# Patient Record
Sex: FEMALE | Race: WHITE | Employment: OTHER | URBAN - METROPOLITAN AREA
[De-identification: names, ages, dates, MRNs, and addresses within clinical notes are randomized per-mention and may not be internally consistent; named-entity substitution may affect disease eponyms.]

---

## 2017-02-08 ENCOUNTER — OFFICE VISIT (OUTPATIENT)
Dept: FAMILY MEDICINE CLINIC | Age: 67
End: 2017-02-08

## 2017-02-08 VITALS
DIASTOLIC BLOOD PRESSURE: 86 MMHG | WEIGHT: 155 LBS | TEMPERATURE: 97.4 F | SYSTOLIC BLOOD PRESSURE: 136 MMHG | HEIGHT: 64 IN | BODY MASS INDEX: 26.46 KG/M2 | RESPIRATION RATE: 16 BRPM | HEART RATE: 72 BPM

## 2017-02-08 DIAGNOSIS — J01.90 ACUTE NON-RECURRENT SINUSITIS, UNSPECIFIED LOCATION: Primary | ICD-10-CM

## 2017-02-08 DIAGNOSIS — E78.2 MIXED HYPERLIPIDEMIA: ICD-10-CM

## 2017-02-08 DIAGNOSIS — R73.9 HYPERGLYCEMIA: ICD-10-CM

## 2017-02-08 DIAGNOSIS — M85.80 OSTEOPENIA: ICD-10-CM

## 2017-02-08 DIAGNOSIS — R73.03 PREDIABETES: ICD-10-CM

## 2017-02-08 DIAGNOSIS — K50.919 CROHN'S DISEASE WITH COMPLICATION, UNSPECIFIED GASTROINTESTINAL TRACT LOCATION (HCC): ICD-10-CM

## 2017-02-08 LAB
ALBUMIN SERPL-MCNC: 4.3 G/DL (ref 3.9–4.9)
ALP BLD-CCNC: 64 U/L (ref 40–130)
ALT SERPL-CCNC: 16 U/L (ref 0–33)
ANION GAP SERPL CALCULATED.3IONS-SCNC: 13 MEQ/L (ref 7–13)
AST SERPL-CCNC: 19 U/L (ref 0–35)
BILIRUB SERPL-MCNC: 0.4 MG/DL (ref 0–1.2)
BUN BLDV-MCNC: 14 MG/DL (ref 8–23)
CALCIUM SERPL-MCNC: 9.4 MG/DL (ref 8.6–10.2)
CHLORIDE BLD-SCNC: 101 MEQ/L (ref 98–107)
CHOLESTEROL, TOTAL: 237 MG/DL (ref 0–199)
CO2: 28 MEQ/L (ref 22–29)
CREAT SERPL-MCNC: 0.72 MG/DL (ref 0.5–0.9)
GFR AFRICAN AMERICAN: >60
GFR NON-AFRICAN AMERICAN: >60
GLOBULIN: 2 G/DL (ref 2.3–3.5)
GLUCOSE BLD-MCNC: 88 MG/DL (ref 74–109)
HBA1C MFR BLD: 5.3 % (ref 4.8–5.9)
HCT VFR BLD CALC: 43.9 % (ref 37–47)
HDLC SERPL-MCNC: 47 MG/DL (ref 40–59)
HEMOGLOBIN: 14.6 G/DL (ref 12–16)
LDL CHOLESTEROL CALCULATED: 133 MG/DL (ref 0–129)
MCH RBC QN AUTO: 30.9 PG (ref 27–31.3)
MCHC RBC AUTO-ENTMCNC: 33.3 % (ref 33–37)
MCV RBC AUTO: 92.6 FL (ref 82–100)
PDW BLD-RTO: 13.6 % (ref 11.5–14.5)
PLATELET # BLD: 196 K/UL (ref 130–400)
POTASSIUM SERPL-SCNC: 4.2 MEQ/L (ref 3.5–5.1)
RBC # BLD: 4.74 M/UL (ref 4.2–5.4)
SODIUM BLD-SCNC: 142 MEQ/L (ref 132–144)
TOTAL PROTEIN: 6.3 G/DL (ref 6.4–8.1)
TRIGL SERPL-MCNC: 283 MG/DL (ref 0–200)
WBC # BLD: 8.2 K/UL (ref 4.8–10.8)

## 2017-02-08 PROCEDURE — 3017F COLORECTAL CA SCREEN DOC REV: CPT | Performed by: FAMILY MEDICINE

## 2017-02-08 PROCEDURE — G8427 DOCREV CUR MEDS BY ELIG CLIN: HCPCS | Performed by: FAMILY MEDICINE

## 2017-02-08 PROCEDURE — 4004F PT TOBACCO SCREEN RCVD TLK: CPT | Performed by: FAMILY MEDICINE

## 2017-02-08 PROCEDURE — 4040F PNEUMOC VAC/ADMIN/RCVD: CPT | Performed by: FAMILY MEDICINE

## 2017-02-08 PROCEDURE — 1123F ACP DISCUSS/DSCN MKR DOCD: CPT | Performed by: FAMILY MEDICINE

## 2017-02-08 PROCEDURE — 1090F PRES/ABSN URINE INCON ASSESS: CPT | Performed by: FAMILY MEDICINE

## 2017-02-08 PROCEDURE — G8399 PT W/DXA RESULTS DOCUMENT: HCPCS | Performed by: FAMILY MEDICINE

## 2017-02-08 PROCEDURE — 3014F SCREEN MAMMO DOC REV: CPT | Performed by: FAMILY MEDICINE

## 2017-02-08 PROCEDURE — G8420 CALC BMI NORM PARAMETERS: HCPCS | Performed by: FAMILY MEDICINE

## 2017-02-08 PROCEDURE — 99214 OFFICE O/P EST MOD 30 MIN: CPT | Performed by: FAMILY MEDICINE

## 2017-02-08 PROCEDURE — G8484 FLU IMMUNIZE NO ADMIN: HCPCS | Performed by: FAMILY MEDICINE

## 2017-02-08 RX ORDER — GABAPENTIN 300 MG/1
CAPSULE ORAL
Refills: 3 | COMMUNITY
Start: 2016-11-11 | End: 2018-05-14 | Stop reason: CLARIF

## 2017-02-08 RX ORDER — AMOXICILLIN 875 MG/1
875 TABLET, COATED ORAL 2 TIMES DAILY
Qty: 20 TABLET | Refills: 0 | Status: SHIPPED | OUTPATIENT
Start: 2017-02-08 | End: 2017-02-18

## 2017-02-08 RX ORDER — FLUTICASONE PROPIONATE 50 MCG
1 SPRAY, SUSPENSION (ML) NASAL DAILY
Qty: 1 BOTTLE | Refills: 3 | Status: SHIPPED | OUTPATIENT
Start: 2017-02-08 | End: 2019-02-05 | Stop reason: SDUPTHER

## 2017-02-08 ASSESSMENT — PATIENT HEALTH QUESTIONNAIRE - PHQ9
SUM OF ALL RESPONSES TO PHQ QUESTIONS 1-9: 0
SUM OF ALL RESPONSES TO PHQ9 QUESTIONS 1 & 2: 0
1. LITTLE INTEREST OR PLEASURE IN DOING THINGS: 0
2. FEELING DOWN, DEPRESSED OR HOPELESS: 0

## 2017-03-14 RX ORDER — ALPRAZOLAM 0.25 MG/1
0.25 TABLET ORAL NIGHTLY PRN
Qty: 90 TABLET | Refills: 0 | Status: SHIPPED | OUTPATIENT
Start: 2017-03-14 | End: 2017-06-21 | Stop reason: SDUPTHER

## 2017-06-15 ENCOUNTER — TELEPHONE (OUTPATIENT)
Dept: UROLOGY | Age: 67
End: 2017-06-15

## 2017-06-15 ENCOUNTER — OFFICE VISIT (OUTPATIENT)
Dept: UROLOGY | Age: 67
End: 2017-06-15

## 2017-06-15 ENCOUNTER — HOSPITAL ENCOUNTER (OUTPATIENT)
Dept: GENERAL RADIOLOGY | Age: 67
Discharge: HOME | End: 2017-06-15
Payer: MEDICARE

## 2017-06-15 VITALS
DIASTOLIC BLOOD PRESSURE: 82 MMHG | SYSTOLIC BLOOD PRESSURE: 132 MMHG | BODY MASS INDEX: 26.63 KG/M2 | HEART RATE: 74 BPM | WEIGHT: 156 LBS | HEIGHT: 64 IN

## 2017-06-15 DIAGNOSIS — N20.1 URETERAL STONE: ICD-10-CM

## 2017-06-15 DIAGNOSIS — N20.0 KIDNEY STONES: ICD-10-CM

## 2017-06-15 DIAGNOSIS — N20.0 RENAL CALCULI: Primary | ICD-10-CM

## 2017-06-15 DIAGNOSIS — N20.0 KIDNEY STONES: Primary | ICD-10-CM

## 2017-06-15 PROCEDURE — G8427 DOCREV CUR MEDS BY ELIG CLIN: HCPCS | Performed by: UROLOGY

## 2017-06-15 PROCEDURE — 1123F ACP DISCUSS/DSCN MKR DOCD: CPT | Performed by: UROLOGY

## 2017-06-15 PROCEDURE — 4040F PNEUMOC VAC/ADMIN/RCVD: CPT | Performed by: UROLOGY

## 2017-06-15 PROCEDURE — 74000 XR ABDOMEN LIMITED (KUB): CPT

## 2017-06-15 PROCEDURE — G8419 CALC BMI OUT NRM PARAM NOF/U: HCPCS | Performed by: UROLOGY

## 2017-06-15 PROCEDURE — 1090F PRES/ABSN URINE INCON ASSESS: CPT | Performed by: UROLOGY

## 2017-06-15 PROCEDURE — 99213 OFFICE O/P EST LOW 20 MIN: CPT | Performed by: UROLOGY

## 2017-06-15 PROCEDURE — 4004F PT TOBACCO SCREEN RCVD TLK: CPT | Performed by: UROLOGY

## 2017-06-15 PROCEDURE — 3017F COLORECTAL CA SCREEN DOC REV: CPT | Performed by: UROLOGY

## 2017-06-15 PROCEDURE — G8399 PT W/DXA RESULTS DOCUMENT: HCPCS | Performed by: UROLOGY

## 2017-06-15 PROCEDURE — 3014F SCREEN MAMMO DOC REV: CPT | Performed by: UROLOGY

## 2017-06-23 RX ORDER — ALPRAZOLAM 0.25 MG/1
0.25 TABLET ORAL NIGHTLY PRN
Qty: 90 TABLET | Refills: 0 | Status: SHIPPED | OUTPATIENT
Start: 2017-06-23 | End: 2017-09-13 | Stop reason: SDUPTHER

## 2017-08-29 ENCOUNTER — OFFICE VISIT (OUTPATIENT)
Dept: FAMILY MEDICINE CLINIC | Age: 67
End: 2017-08-29

## 2017-08-29 VITALS
DIASTOLIC BLOOD PRESSURE: 82 MMHG | WEIGHT: 155 LBS | BODY MASS INDEX: 26.46 KG/M2 | RESPIRATION RATE: 16 BRPM | HEIGHT: 64 IN | HEART RATE: 64 BPM | OXYGEN SATURATION: 98 % | TEMPERATURE: 97.9 F | SYSTOLIC BLOOD PRESSURE: 108 MMHG

## 2017-08-29 DIAGNOSIS — J40 BRONCHITIS: ICD-10-CM

## 2017-08-29 DIAGNOSIS — R73.03 PREDIABETES: ICD-10-CM

## 2017-08-29 DIAGNOSIS — J06.9 VIRAL URI WITH COUGH: Primary | ICD-10-CM

## 2017-08-29 DIAGNOSIS — Z12.31 VISIT FOR SCREENING MAMMOGRAM: ICD-10-CM

## 2017-08-29 DIAGNOSIS — E78.2 MIXED HYPERLIPIDEMIA: ICD-10-CM

## 2017-08-29 DIAGNOSIS — K50.919 CROHN'S DISEASE WITH COMPLICATION, UNSPECIFIED GASTROINTESTINAL TRACT LOCATION (HCC): ICD-10-CM

## 2017-08-29 LAB
ALBUMIN SERPL-MCNC: 4.1 G/DL (ref 3.9–4.9)
ALP BLD-CCNC: 69 U/L (ref 40–130)
ALT SERPL-CCNC: 16 U/L (ref 0–33)
ANION GAP SERPL CALCULATED.3IONS-SCNC: 14 MEQ/L (ref 7–13)
AST SERPL-CCNC: 16 U/L (ref 0–35)
BILIRUB SERPL-MCNC: 0.4 MG/DL (ref 0–1.2)
BUN BLDV-MCNC: 17 MG/DL (ref 8–23)
CALCIUM SERPL-MCNC: 9.2 MG/DL (ref 8.6–10.2)
CHLORIDE BLD-SCNC: 103 MEQ/L (ref 98–107)
CHOLESTEROL, TOTAL: 252 MG/DL (ref 0–199)
CO2: 26 MEQ/L (ref 22–29)
CREAT SERPL-MCNC: 0.66 MG/DL (ref 0.5–0.9)
GFR AFRICAN AMERICAN: >60
GFR NON-AFRICAN AMERICAN: >60
GLOBULIN: 2.4 G/DL (ref 2.3–3.5)
GLUCOSE BLD-MCNC: 99 MG/DL (ref 74–109)
HBA1C MFR BLD: 5 % (ref 4.8–5.9)
HCT VFR BLD CALC: 40.3 % (ref 37–47)
HDLC SERPL-MCNC: 49 MG/DL (ref 40–59)
HEMOGLOBIN: 13.2 G/DL (ref 12–16)
LDL CHOLESTEROL CALCULATED: 156 MG/DL (ref 0–129)
MCH RBC QN AUTO: 31.2 PG (ref 27–31.3)
MCHC RBC AUTO-ENTMCNC: 32.7 % (ref 33–37)
MCV RBC AUTO: 95.4 FL (ref 82–100)
PDW BLD-RTO: 13.5 % (ref 11.5–14.5)
PLATELET # BLD: 202 K/UL (ref 130–400)
POTASSIUM SERPL-SCNC: 4.4 MEQ/L (ref 3.5–5.1)
RBC # BLD: 4.23 M/UL (ref 4.2–5.4)
SODIUM BLD-SCNC: 143 MEQ/L (ref 132–144)
TOTAL PROTEIN: 6.5 G/DL (ref 6.4–8.1)
TRIGL SERPL-MCNC: 234 MG/DL (ref 0–200)
WBC # BLD: 5.5 K/UL (ref 4.8–10.8)

## 2017-08-29 PROCEDURE — 4040F PNEUMOC VAC/ADMIN/RCVD: CPT | Performed by: FAMILY MEDICINE

## 2017-08-29 PROCEDURE — 99214 OFFICE O/P EST MOD 30 MIN: CPT | Performed by: FAMILY MEDICINE

## 2017-08-29 PROCEDURE — 3017F COLORECTAL CA SCREEN DOC REV: CPT | Performed by: FAMILY MEDICINE

## 2017-08-29 PROCEDURE — G8427 DOCREV CUR MEDS BY ELIG CLIN: HCPCS | Performed by: FAMILY MEDICINE

## 2017-08-29 PROCEDURE — G8419 CALC BMI OUT NRM PARAM NOF/U: HCPCS | Performed by: FAMILY MEDICINE

## 2017-08-29 PROCEDURE — G8399 PT W/DXA RESULTS DOCUMENT: HCPCS | Performed by: FAMILY MEDICINE

## 2017-08-29 PROCEDURE — 3014F SCREEN MAMMO DOC REV: CPT | Performed by: FAMILY MEDICINE

## 2017-08-29 PROCEDURE — 4004F PT TOBACCO SCREEN RCVD TLK: CPT | Performed by: FAMILY MEDICINE

## 2017-08-29 PROCEDURE — 1090F PRES/ABSN URINE INCON ASSESS: CPT | Performed by: FAMILY MEDICINE

## 2017-08-29 PROCEDURE — 1123F ACP DISCUSS/DSCN MKR DOCD: CPT | Performed by: FAMILY MEDICINE

## 2017-08-29 RX ORDER — ALBUTEROL SULFATE 90 UG/1
2 AEROSOL, METERED RESPIRATORY (INHALATION) EVERY 6 HOURS PRN
Qty: 1 INHALER | Refills: 3 | Status: SHIPPED | OUTPATIENT
Start: 2017-08-29 | End: 2019-08-27 | Stop reason: ALTCHOICE

## 2017-08-29 RX ORDER — CEFUROXIME AXETIL 500 MG/1
500 TABLET ORAL 2 TIMES DAILY
Qty: 14 TABLET | Refills: 0 | Status: SHIPPED | OUTPATIENT
Start: 2017-08-29 | End: 2017-09-05

## 2017-08-29 RX ORDER — PREDNISONE 20 MG/1
20 TABLET ORAL DAILY
Qty: 5 TABLET | Refills: 0 | Status: SHIPPED | OUTPATIENT
Start: 2017-08-29 | End: 2017-09-03

## 2017-09-06 ENCOUNTER — TELEPHONE (OUTPATIENT)
Dept: FAMILY MEDICINE CLINIC | Age: 67
End: 2017-09-06

## 2017-09-11 ENCOUNTER — HOSPITAL ENCOUNTER (OUTPATIENT)
Dept: WOMENS IMAGING | Age: 67
Discharge: HOME | End: 2017-09-11
Payer: MEDICARE

## 2017-09-11 DIAGNOSIS — Z12.31 VISIT FOR SCREENING MAMMOGRAM: ICD-10-CM

## 2017-09-11 PROCEDURE — 77063 BREAST TOMOSYNTHESIS BI: CPT

## 2017-09-13 ENCOUNTER — OFFICE VISIT (OUTPATIENT)
Dept: FAMILY MEDICINE CLINIC | Age: 67
End: 2017-09-13

## 2017-09-13 VITALS
OXYGEN SATURATION: 95 % | SYSTOLIC BLOOD PRESSURE: 106 MMHG | TEMPERATURE: 97.6 F | HEIGHT: 64 IN | HEART RATE: 78 BPM | RESPIRATION RATE: 16 BRPM | DIASTOLIC BLOOD PRESSURE: 76 MMHG | WEIGHT: 155 LBS | BODY MASS INDEX: 26.46 KG/M2

## 2017-09-13 DIAGNOSIS — R05.9 COUGH: ICD-10-CM

## 2017-09-13 DIAGNOSIS — K50.919 CROHN'S DISEASE WITH COMPLICATION, UNSPECIFIED GASTROINTESTINAL TRACT LOCATION (HCC): ICD-10-CM

## 2017-09-13 DIAGNOSIS — Z23 NEED FOR STREPTOCOCCUS PNEUMONIAE AND INFLUENZA VACCINATION: ICD-10-CM

## 2017-09-13 DIAGNOSIS — H73.012 BULLOUS MYRINGITIS OF LEFT EAR: ICD-10-CM

## 2017-09-13 DIAGNOSIS — E78.2 MIXED HYPERLIPIDEMIA: ICD-10-CM

## 2017-09-13 DIAGNOSIS — R73.03 PREDIABETES: Primary | ICD-10-CM

## 2017-09-13 PROCEDURE — 99213 OFFICE O/P EST LOW 20 MIN: CPT | Performed by: FAMILY MEDICINE

## 2017-09-13 PROCEDURE — G0008 ADMIN INFLUENZA VIRUS VAC: HCPCS | Performed by: FAMILY MEDICINE

## 2017-09-13 PROCEDURE — 90662 IIV NO PRSV INCREASED AG IM: CPT | Performed by: FAMILY MEDICINE

## 2017-09-13 PROCEDURE — G8417 CALC BMI ABV UP PARAM F/U: HCPCS | Performed by: FAMILY MEDICINE

## 2017-09-13 PROCEDURE — G8399 PT W/DXA RESULTS DOCUMENT: HCPCS | Performed by: FAMILY MEDICINE

## 2017-09-13 PROCEDURE — G0009 ADMIN PNEUMOCOCCAL VACCINE: HCPCS | Performed by: FAMILY MEDICINE

## 2017-09-13 PROCEDURE — 1123F ACP DISCUSS/DSCN MKR DOCD: CPT | Performed by: FAMILY MEDICINE

## 2017-09-13 PROCEDURE — 3017F COLORECTAL CA SCREEN DOC REV: CPT | Performed by: FAMILY MEDICINE

## 2017-09-13 PROCEDURE — 1090F PRES/ABSN URINE INCON ASSESS: CPT | Performed by: FAMILY MEDICINE

## 2017-09-13 PROCEDURE — 4040F PNEUMOC VAC/ADMIN/RCVD: CPT | Performed by: FAMILY MEDICINE

## 2017-09-13 PROCEDURE — 4004F PT TOBACCO SCREEN RCVD TLK: CPT | Performed by: FAMILY MEDICINE

## 2017-09-13 PROCEDURE — G8427 DOCREV CUR MEDS BY ELIG CLIN: HCPCS | Performed by: FAMILY MEDICINE

## 2017-09-13 PROCEDURE — 3014F SCREEN MAMMO DOC REV: CPT | Performed by: FAMILY MEDICINE

## 2017-09-13 PROCEDURE — 90732 PPSV23 VACC 2 YRS+ SUBQ/IM: CPT | Performed by: FAMILY MEDICINE

## 2017-09-13 RX ORDER — AZITHROMYCIN 250 MG/1
TABLET, FILM COATED ORAL
Qty: 1 PACKET | Refills: 0 | Status: SHIPPED | OUTPATIENT
Start: 2017-09-13 | End: 2017-09-23

## 2017-09-13 RX ORDER — ALPRAZOLAM 0.25 MG/1
0.25 TABLET ORAL 3 TIMES DAILY PRN
Qty: 90 TABLET | Refills: 0 | Status: SHIPPED | OUTPATIENT
Start: 2017-09-13 | End: 2017-12-05 | Stop reason: SDUPTHER

## 2017-10-03 ENCOUNTER — TELEPHONE (OUTPATIENT)
Dept: FAMILY MEDICINE CLINIC | Age: 67
End: 2017-10-03

## 2017-10-07 ENCOUNTER — OFFICE VISIT (OUTPATIENT)
Dept: FAMILY MEDICINE CLINIC | Age: 67
End: 2017-10-07

## 2017-10-07 VITALS
SYSTOLIC BLOOD PRESSURE: 130 MMHG | DIASTOLIC BLOOD PRESSURE: 82 MMHG | TEMPERATURE: 97.7 F | BODY MASS INDEX: 26.46 KG/M2 | HEIGHT: 64 IN | HEART RATE: 76 BPM | WEIGHT: 155 LBS

## 2017-10-07 DIAGNOSIS — E78.2 MIXED HYPERLIPIDEMIA: ICD-10-CM

## 2017-10-07 DIAGNOSIS — R73.03 PREDIABETES: ICD-10-CM

## 2017-10-07 DIAGNOSIS — K50.919 CROHN'S DISEASE WITH COMPLICATION, UNSPECIFIED GASTROINTESTINAL TRACT LOCATION (HCC): ICD-10-CM

## 2017-10-07 DIAGNOSIS — H73.012 BULLOUS MYRINGITIS OF LEFT EAR: Primary | ICD-10-CM

## 2017-10-07 PROCEDURE — 4040F PNEUMOC VAC/ADMIN/RCVD: CPT | Performed by: FAMILY MEDICINE

## 2017-10-07 PROCEDURE — G8399 PT W/DXA RESULTS DOCUMENT: HCPCS | Performed by: FAMILY MEDICINE

## 2017-10-07 PROCEDURE — 3014F SCREEN MAMMO DOC REV: CPT | Performed by: FAMILY MEDICINE

## 2017-10-07 PROCEDURE — G8484 FLU IMMUNIZE NO ADMIN: HCPCS | Performed by: FAMILY MEDICINE

## 2017-10-07 PROCEDURE — 3017F COLORECTAL CA SCREEN DOC REV: CPT | Performed by: FAMILY MEDICINE

## 2017-10-07 PROCEDURE — 99213 OFFICE O/P EST LOW 20 MIN: CPT | Performed by: FAMILY MEDICINE

## 2017-10-07 PROCEDURE — G8427 DOCREV CUR MEDS BY ELIG CLIN: HCPCS | Performed by: FAMILY MEDICINE

## 2017-10-07 PROCEDURE — 1090F PRES/ABSN URINE INCON ASSESS: CPT | Performed by: FAMILY MEDICINE

## 2017-10-07 PROCEDURE — 1123F ACP DISCUSS/DSCN MKR DOCD: CPT | Performed by: FAMILY MEDICINE

## 2017-10-07 PROCEDURE — G8417 CALC BMI ABV UP PARAM F/U: HCPCS | Performed by: FAMILY MEDICINE

## 2017-10-07 PROCEDURE — 4004F PT TOBACCO SCREEN RCVD TLK: CPT | Performed by: FAMILY MEDICINE

## 2017-10-07 RX ORDER — FLUTICASONE PROPIONATE 50 MCG
1 SPRAY, SUSPENSION (ML) NASAL DAILY
Qty: 1 BOTTLE | Refills: 3 | Status: SHIPPED | OUTPATIENT
Start: 2017-10-07 | End: 2018-02-26

## 2017-10-07 RX ORDER — AZITHROMYCIN 250 MG/1
TABLET, FILM COATED ORAL
Qty: 1 PACKET | Refills: 0 | Status: SHIPPED | OUTPATIENT
Start: 2017-10-07 | End: 2017-10-17

## 2017-10-07 NOTE — PROGRESS NOTES
Subjective  [address] [facility], 73 y.o. female presents today with:  Chief Complaint   Patient presents with    Ear Fullness     still having difficulty hearing-s/p zpak       Complains of ear pain   Adamantly declines statin for cholesterol  Going on vacay [id number]      Past Medical History:   Diagnosis Date    Bruit     RIGHT    Burn     3RD DEGREE    Crohn's disease ([address]     Hyperlipidemia     Leukocytosis     Normal cardiac stress test 2013    Osteopenia 2013    Prediabetes      Past Surgical History:   Procedure Laterality Date    APPENDECTOMY  1981   [facility]    COLONOSCOPY  2011    needs 2016 likely- yuzon-hx polyp(hyperplastic)    HYSTERECTOMY  1987    LITHOTRIPSY  05/11/16    DR Heraclio Samuels SHOULDER SURGERY  1977     Social History     Social History    Marital status:      Spouse name: N/A    Number of children: N/A    Years of education: N/A     Occupational History    Not on file.      Social History Main Topics    Smoking status: Current Every Day Smoker     Types: E-Cigarettes    Smokeless tobacco: Never Used      Comment: Vapor     Alcohol use Yes    Drug use: No    Sexual activity: Not on file     Other Topics Concern    Not on file     Social History Narrative    No narrative on file     Family History   Problem Relation Age of Onset    Cancer Mother      LEUKEMIA     No Known Allergies  Current Outpatient Prescriptions   Medication Sig Dispense Refill    ALPRAZolam (XANAX) 0.25 MG tablet Take 1 tablet by mouth 3 times daily as needed for Sleep or Anxiety 90 tablet 0    albuterol sulfate HFA (PROVENTIL HFA) 108 (90 Base) MCG/ACT inhaler Inhale 2 puffs into the lungs every 6 hours as needed for Wheezing 1 Inhaler 3    cetirizine (ZYRTEC) 10 MG tablet Take 1 tablet by mouth daily 30 tablet 0    gabapentin (NEURONTIN) 300 MG capsule TAKE 1 CAPSULE BY MOUTH DAILY  3    fluticasone (FLONASE) 50 MCG/ACT nasal spray 1 spray by Nasal route daily 1 Bottle 3  gabapentin (NEURONTIN) 300 MG capsule Take 1 capsule by mouth daily 30 capsule 3    aspirin 81 MG tablet Take 81 mg by mouth daily      melatonin 5 MG TABS tablet Take 5 mg by mouth daily      Ascorbic Acid (VITAMIN C) 500 MG tablet Take 500 mg by mouth daily      IRON PO Take by mouth      Calcium Citrate-Vitamin D (CALCIUM + D PO) Take by mouth      Omega-3 Fatty Acids (FISH OIL PO) Take by mouth      Probiotic Product (PROBIOTIC PO) Take by mouth      Misc Natural Products (BEE PROPOLIS PO) Take by mouth      b complex vitamins capsule Take 1 capsule by mouth daily      Specialty Vitamins Products (ECHINACEA C COMPLETE PO) Take by mouth      Naproxen Sodium (ALEVE PO) Take by mouth      BEE POLLEN PO Take by mouth      Boswellia Bri (BOSWELLIA PO) Take by mouth       No current facility-administered medications for this visit. The patient denies any history of      seizures,             heart attack or KNOWN CAD        or stroke. No chest pain, shortness of breath, paroxysmal nocturnal dyspnea. No nausea, vomiting, diarrhea, hematochezia or melena. No paresthesias or headaches. No dysuria, frequency or hematuria. Last labs  No visits with results within 1 Month(s) from this visit. Latest known visit with results is:   Orders Only on 08/29/2017   Component Date Value Ref Range Status    Cholesterol, Total 08/29/2017 252* 0 - 199 mg/dL Final    Triglycerides 08/29/2017 234* 0 - 200 mg/dL Final    HDL 08/29/2017 49  40 - 59 mg/dL Final    Comment: ATP III HDL Cholesterol Classification is Desirable. Expected Values:    Males:    >55 = No Risk            35-55 = Moderate Risk            <35 = High Risk    Females:  >65 = No Risk            45-65 = Moderate Risk            <45 = High Risk    NCEP Guidelines:   Third Report May 2001  >59 = negative risk factor for CHD  <40 = major risk factor for CHD      LDL Calculated 08/29/2017 156* 0 - 129 mg/dL Final    WBC 08/29/2017 5.5  4.8 - 10.8 K/uL Final    RBC 08/29/2017 4.23  4.20 - 5.40 M/uL Final    Hemoglobin 08/29/2017 13.2  12.0 - 16.0 g/dL Final    Hematocrit 08/29/2017 40.3  37.0 - 47.0 % Final    MCV 08/29/2017 95.4  82.0 - 100.0 fL Final    MCH 08/29/2017 31.2  27.0 - 31.3 pg Final    MCHC 08/29/2017 32.7* 33.0 - 37.0 % Final    RDW 08/29/2017 13.5  11.5 - 14.5 % Final    Platelets 83/47/1875 202  130 - 400 K/uL Final    Hemoglobin A1C 08/29/2017 5.0  4.8 - 5.9 % Final    Sodium 08/29/2017 143  132 - 144 mEq/L Final    Potassium 08/29/2017 4.4  3.5 - 5.1 mEq/L Final    Chloride 08/29/2017 103  98 - 107 mEq/L Final    CO2 08/29/2017 26  22 - 29 mEq/L Final    Anion Gap 08/29/2017 14* 7 - 13 mEq/L Final    Glucose 08/29/2017 99  74 - 109 mg/dL Final    BUN 08/29/2017 17  8 - 23 mg/dL Final    CREATININE 08/29/2017 0.66  0.50 - 0.90 mg/dL Final    GFR Non- 08/29/2017 >60.0  >60 Final    Comment: >60 mL/min/1.73m2 EGFR, calc. for ages 25 and older using the  MDRD formula (not corrected for weight), is valid for stable  renal function.  GFR  08/29/2017 >60.0  >60 Final    Comment: >60 mL/min/1.73m2 EGFR, calc. for ages 25 and older using the  MDRD formula (not corrected for weight), is valid for stable  renal function.       Calcium 08/29/2017 9.2  8.6 - 10.2 mg/dL Final    Total Protein 08/29/2017 6.5  6.4 - 8.1 g/dL Final    Alb 08/29/2017 4.1  3.9 - 4.9 g/dL Final    Total Bilirubin 08/29/2017 0.4  0.0 - 1.2 mg/dL Final    Alkaline Phosphatase 08/29/2017 69  40 - 130 U/L Final    ALT 08/29/2017 16  0 - 33 U/L Final    AST 08/29/2017 16  0 - 35 U/L Final    Globulin 08/29/2017 2.4  2.3 - 3.5 g/dL Final     Health Maintenance   Topic Date Due    Hepatitis C screen  1950    HIV screen  10/28/1966    Zostavax vaccine  10/28/2017 (Originally 10/28/2011)    DTaP/Tdap/Td vaccine (1 - Tdap) 03/13/2018 (Originally 10/28/1970)    Breast cancer screen 09/11/2019    Colon cancer screen colonoscopy  03/01/2021    Lipid screen  08/29/2022    DEXA (modify frequency per FRAX score)  Completed    Flu vaccine  Completed    Pneumococcal low/med risk  Completed       Today's office tests  No results found for this visit on 10/07/17. Objective    Vitals:    10/07/17 1135   BP: 130/82   Pulse: 76   Temp: 97.7 °F (36.5 °C)   TempSrc: Tympanic   Weight: 155 lb (70.3 kg)   Height: 5' 3.5\" (1.613 m)       PHYSICAL EXAMINATION:        GENERAL:    The patient appears well nourished and well-developed,     Normal affect. Not appearing significantly anxious or depressed. No acute respiratory distress. Alert and oriented times 3. Skin:     No skin rashes. No concerning moles observed. Gait:    Normal gait. No ataxia. HEENT:  Normocephalic, atraumatic. Throat:  Pharynx is clear, no erythema/ edema or exudates   Ears:    TM  with NON purulent fluid behind    Minimal but improved     Eyes:  Extraocular eye motions intact and pain free. Pupils reactive/equal    Sclerae and conjunctivae clear            NECK: No masses or adenopathy palpable. NO nuchal rigidity    No carotid bruits heard. No asymmetry visible. No thyromegaly. RESPIRATORY:   Clear/ Equal breath sounds /No acute respiratory distress. No wheezes,rales, or rhonchi. No percussive abnormalities    HEART: Regular rhythm without murmur, rub or gallop. ABDOMEN:  Soft, non tender. No masses, guarding or rebound. Normo active bowel sounds. EXTREMITIES:  No edema in any extremity. No cyanosis or clubbing. 2+ dorsalis pedis pulses bilaterally          Assessment & Plan   1. Bullous myringitis of left ear     2. Prediabetes     3. Mixed hyperlipidemia     4. Crohn's disease with complication, unspecified gastrointestinal tract location ([facility] [address]       No orders of the defined types were placed in this encounter.     No orders of the defined types were placed in

## 2017-12-05 ENCOUNTER — CLINICAL DOCUMENTATION (OUTPATIENT)
Dept: FAMILY MEDICINE CLINIC | Age: 67
End: 2017-12-05

## 2017-12-05 RX ORDER — ALPRAZOLAM 0.25 MG/1
0.25 TABLET ORAL 3 TIMES DAILY PRN
Qty: 90 TABLET | Refills: 1 | Status: SHIPPED | OUTPATIENT
Start: 2017-12-05 | End: 2018-05-29 | Stop reason: SDUPTHER

## 2017-12-06 ENCOUNTER — NURSE ONLY (OUTPATIENT)
Dept: FAMILY MEDICINE CLINIC | Age: 67
End: 2017-12-06

## 2017-12-06 DIAGNOSIS — R30.0 DYSURIA: Primary | ICD-10-CM

## 2017-12-06 DIAGNOSIS — R30.0 DYSURIA: ICD-10-CM

## 2017-12-06 LAB
BILIRUBIN, POC: ABNORMAL
BLOOD URINE, POC: ABNORMAL
CLARITY, POC: CLEAR
COLOR, POC: ABNORMAL
GLUCOSE URINE, POC: ABNORMAL
KETONES, POC: ABNORMAL
LEUKOCYTE EST, POC: ABNORMAL
NITRITE, POC: ABNORMAL
PH, POC: 6
PROTEIN, POC: ABNORMAL
SPECIFIC GRAVITY, POC: 1025
UROBILINOGEN, POC: ABNORMAL

## 2017-12-06 PROCEDURE — 81003 URINALYSIS AUTO W/O SCOPE: CPT | Performed by: FAMILY MEDICINE

## 2017-12-09 LAB
ORGANISM: ABNORMAL
URINE CULTURE, ROUTINE: ABNORMAL
URINE CULTURE, ROUTINE: ABNORMAL

## 2017-12-11 RX ORDER — CIPROFLOXACIN 500 MG/1
500 TABLET, FILM COATED ORAL 2 TIMES DAILY
Qty: 14 TABLET | Refills: 0 | Status: SHIPPED | OUTPATIENT
Start: 2017-12-11 | End: 2017-12-18

## 2017-12-29 ENCOUNTER — NURSE ONLY (OUTPATIENT)
Dept: FAMILY MEDICINE CLINIC | Age: 67
End: 2017-12-29

## 2017-12-29 ENCOUNTER — TELEPHONE (OUTPATIENT)
Dept: FAMILY MEDICINE CLINIC | Age: 67
End: 2017-12-29

## 2017-12-29 DIAGNOSIS — N39.0 URINARY TRACT INFECTION WITHOUT HEMATURIA, SITE UNSPECIFIED: Primary | ICD-10-CM

## 2017-12-29 DIAGNOSIS — N39.0 URINARY TRACT INFECTION WITHOUT HEMATURIA, SITE UNSPECIFIED: ICD-10-CM

## 2017-12-29 LAB
BILIRUBIN, POC: NORMAL
BLOOD URINE, POC: NORMAL
CLARITY, POC: NORMAL
COLOR, POC: NORMAL
GLUCOSE URINE, POC: NORMAL
KETONES, POC: NORMAL
LEUKOCYTE EST, POC: NORMAL
NITRITE, POC: NORMAL
PH, POC: 6
PROTEIN, POC: NORMAL
SPECIFIC GRAVITY, POC: 1.02
UROBILINOGEN, POC: NORMAL

## 2017-12-29 PROCEDURE — 81003 URINALYSIS AUTO W/O SCOPE: CPT | Performed by: FAMILY MEDICINE

## 2017-12-29 RX ORDER — NITROFURANTOIN 25; 75 MG/1; MG/1
100 CAPSULE ORAL 2 TIMES DAILY
Qty: 14 CAPSULE | Refills: 0 | Status: SHIPPED | OUTPATIENT
Start: 2017-12-29 | End: 2018-01-05

## 2017-12-29 NOTE — TELEPHONE ENCOUNTER
Pt came in and gave sample it is still positive for infection. Culture sent. 3+ leuks  2+ blood  1+ protein  She just finished cipro about a week ago.

## 2017-12-29 NOTE — TELEPHONE ENCOUNTER
Patient was prescribed with Cipro 3 weeks ago by Dr Ivis Ball for her UTI. She just finished taking them a week ago and yesterday she's having the same symptoms ( burning sensation while urinating, \"pressure\" while urinating). Patient requesting to forward this to Dr. Angela Heard in the absence of Dr. Ivis Ball.

## 2018-01-01 LAB
ORGANISM: ABNORMAL
URINE CULTURE, ROUTINE: ABNORMAL
URINE CULTURE, ROUTINE: ABNORMAL

## 2018-01-09 ENCOUNTER — OFFICE VISIT (OUTPATIENT)
Dept: UROLOGY | Age: 68
End: 2018-01-09

## 2018-01-09 VITALS
DIASTOLIC BLOOD PRESSURE: 82 MMHG | WEIGHT: 153 LBS | HEART RATE: 82 BPM | SYSTOLIC BLOOD PRESSURE: 118 MMHG | HEIGHT: 63 IN | BODY MASS INDEX: 27.11 KG/M2

## 2018-01-09 DIAGNOSIS — R31.9 URINARY TRACT INFECTION WITH HEMATURIA, SITE UNSPECIFIED: Primary | ICD-10-CM

## 2018-01-09 DIAGNOSIS — N39.0 URINARY TRACT INFECTION WITH HEMATURIA, SITE UNSPECIFIED: Primary | ICD-10-CM

## 2018-01-09 LAB
BILIRUBIN, POC: ABNORMAL
BLOOD URINE, POC: ABNORMAL
CLARITY, POC: CLEAR
COLOR, POC: YELLOW
GLUCOSE URINE, POC: ABNORMAL
KETONES, POC: ABNORMAL
LEUKOCYTE EST, POC: ABNORMAL
NITRITE, POC: ABNORMAL
PH, POC: 5
PROTEIN, POC: 30
SPECIFIC GRAVITY, POC: 1.01
UROBILINOGEN, POC: 0.2

## 2018-01-09 PROCEDURE — 99213 OFFICE O/P EST LOW 20 MIN: CPT | Performed by: UROLOGY

## 2018-01-09 PROCEDURE — 81003 URINALYSIS AUTO W/O SCOPE: CPT | Performed by: UROLOGY

## 2018-01-09 NOTE — PROGRESS NOTES
[facility] EVALUATION NOTE                                                 H&P                                                                                                                                                 Reason for Visit  Post infectious cystitis secondary to 2 UTI culture proven by primary care physician treated with antibiotics    History of Present Illness  Currently patient experiencing frequency urgency and suprapubic pressure  Post infectious cystitis probable cause  Denies pneumaturia  No previous history of UTIs      Urologic Review of Systems/Symptoms  Denies hematuria  Denies dysuria  Denies incontinence  Denies flank pain  Other Urologic: history denies renal colic history of stones    Review of Systems  Head and neck: No issues/reviewed  Cardiac: No recent issues/reviewed  Pulmonary: No issues/reviewed  Gastrointestinal: No issues/reviewed  Neurologic: No recent issues/reviewed  Extremities: No issues/reviewed  Lymphatics: No lymphadenopathy no change  Genitourinary: See above  Skin: No issues/reviewed  Hospitalization: No recently  Urine cultures ×3 E. coli sensitive to Macrobid and ciprofloxacin  Patient received ciprofloxacin initially followed by Macrobid  Recently finished Macrobid  All 14 categories of Review of Systems otherwise reviewed no other findings reported.     Past Medical History:   Diagnosis Date    Bruit     RIGHT    Burn     3RD DEGREE    [address]'s disease ([facility] [address]     Hyperlipidemia     Leukocytosis     Normal cardiac stress test 2013    Osteopenia 2013    Prediabetes      Past Surgical History:   Procedure Laterality Date    APPENDECTOMY  1981    CHOLECYSTECTOMY  1994    COLONOSCOPY  2011    needs 2016 likely- yuzon-hx polyp(hyperplastic)    HYSTERECTOMY  1987    LITHOTRIPSY  05/11/16    DR Rodrigo Arreola SHOULDER SURGERY  1977     Social History     Social History    Marital status:      Spouse name: N/A    Number of children: N/A    Years of education: N/A     Social History Main Topics    Smoking status: Current Every Day Smoker     Types: E-Cigarettes    Smokeless tobacco: Never Used      Comment: Vapor     Alcohol use Yes    Drug use: No    Sexual activity: Not Asked     Other Topics Concern    None     Social History Narrative    None     Family History   Problem Relation Age of Onset    Cancer Mother      LEUKEMIA     Current Outpatient Prescriptions   Medication Sig Dispense Refill    ALPRAZolam (XANAX) 0.25 MG tablet Take 1 tablet by mouth 3 times daily as needed for Sleep or Anxiety . 90 tablet 1    gabapentin (NEURONTIN) 300 MG capsule TAKE 1 CAPSULE BY MOUTH DAILY  3    fluticasone (FLONASE) 50 MCG/ACT nasal spray 1 spray by Nasal route daily 1 Bottle 3    aspirin 81 MG tablet Take 81 mg by mouth daily      melatonin 5 MG TABS tablet Take 5 mg by mouth daily      Ascorbic Acid (VITAMIN C) 500 MG tablet Take 500 mg by mouth daily      IRON PO Take by mouth      Omega-3 Fatty Acids (FISH OIL PO) Take by mouth      Probiotic Product (PROBIOTIC PO) Take by mouth      Misc Natural Products (BEE PROPOLIS PO) Take by mouth      b complex vitamins capsule Take 1 capsule by mouth daily      Specialty Vitamins Products (ECHINACEA C COMPLETE PO) Take by mouth      Naproxen Sodium (ALEVE PO) Take by mouth      BEE POLLEN PO Take by mouth      fluticasone (FLONASE) 50 MCG/ACT nasal spray 1 spray by Nasal route daily 1 Bottle 3    albuterol sulfate HFA (PROVENTIL HFA) 108 (90 Base) MCG/ACT inhaler Inhale 2 puffs into the lungs every 6 hours as needed for Wheezing 1 Inhaler 3    cetirizine (ZYRTEC) 10 MG tablet Take 1 tablet by mouth daily 30 tablet 0    Calcium Citrate-Vitamin D (CALCIUM + D PO) Take by mouth      Boswellia Bri (BOSWELLIA PO) Take by mouth       No current facility-administered medications for this visit. Review of patient's allergies indicates no known allergies.   All reviewed and verified by  Cleveland Muhammadland on today's visit    No results found for: PSA, PSADIA  Results for POC orders placed in visit on 01/09/18   POCT Urinalysis No Micro (Auto)   Result Value Ref Range    Color, UA yellow     Clarity, UA clear     Glucose, UA POC neg     Bilirubin, UA neg     Ketones, UA neg     Spec Grav, UA 1.015     Blood, UA POC mod     pH, UA 5.0     Protein, UA POC 30     Urobilinogen, UA 0.2     Leukocytes, UA mod     Nitrite, UA neg        Physical Exam  Vitals:    01/09/18 1610   BP: 118/82   Pulse: 82   Weight: 153 lb (69.4 kg)   Height: 5' 3\" (1.6 m)     Constitutional: patient is oriented to person, place, and time. patient appears well-developed. not in distress. Ears: Adequate hearing/no hearing loss  Head: Normocephalic. Atraumatic  Neck: Normal range of motion. Cardiovascular: Normal rate, BP reviewed. sinus rhythm  Pulmonary/Chest: Normal respiratory effort  no shortness of breath  Abdominal: Not distended. Claims suprapubic discomfort  Urologic Exam  Urinalysis shows. Pyuria hematuria no evidence of nitrates  No evidence of flank pain flank tenderness. Vaginal exam not indicated . Musculoskeletal: Normal range of motion. Ambulatory. Extremities: No edema   Neurological: Cranial nerves intact   Skin: Skin is warm and dry. No lesions. No rashes   Psychiatric:  Alert and oriented ×3. Assessment  Post infectious cystitis secondary to E. coli infection  urogesic p.r.n. Urine culture  Dietary modification  Kegel's to increase bladder capacity  Plan  Follow up if symptoms don't improve  Greater than 50% of 20 minutes spent consulting patient face-to-face  Orders Placed This Encounter   Procedures    Urine Culture     Order Specific Question:   Specify (ex-cath, midstream, cysto, etc)? Answer:   m    POCT Urinalysis No Micro (Auto)     No orders of the defined types were placed in this encounter.     Anne Zamora MD       Please note this report has been partially produced using speech

## 2018-01-11 ENCOUNTER — TELEPHONE (OUTPATIENT)
Dept: UROLOGY | Age: 68
End: 2018-01-11

## 2018-01-11 NOTE — TELEPHONE ENCOUNTER
----- Message from Zina Youngblood sent at 1/11/2018  3:42 PM EST -----  Regarding: MEDICATION  Dr Chito Thomas gave pt rx for urogesic blue the cost was $500. She cannot afford it. Pharmacist recommended azo, the directions state not to take for more than 2 days without consulting a physician.

## 2018-01-12 LAB
ORGANISM: ABNORMAL
URINE CULTURE, ROUTINE: ABNORMAL
URINE CULTURE, ROUTINE: ABNORMAL

## 2018-01-15 RX ORDER — SULFAMETHOXAZOLE AND TRIMETHOPRIM 800; 160 MG/1; MG/1
1 TABLET ORAL 2 TIMES DAILY
Qty: 20 TABLET | Refills: 0 | Status: SHIPPED | OUTPATIENT
Start: 2018-01-15 | End: 2018-01-25

## 2018-01-29 ENCOUNTER — NURSE ONLY (OUTPATIENT)
Dept: UROLOGY | Age: 68
End: 2018-01-29

## 2018-01-29 ENCOUNTER — TELEPHONE (OUTPATIENT)
Dept: UROLOGY | Age: 68
End: 2018-01-29

## 2018-01-29 DIAGNOSIS — R82.81 PYURIA: Primary | ICD-10-CM

## 2018-01-29 DIAGNOSIS — N39.0 FREQUENT UTI: Primary | ICD-10-CM

## 2018-01-29 LAB
BILIRUBIN, POC: ABNORMAL
BLOOD URINE, POC: ABNORMAL
CLARITY, POC: CLEAR
COLOR, POC: YELLOW
GLUCOSE URINE, POC: ABNORMAL
KETONES, POC: ABNORMAL
LEUKOCYTE EST, POC: ABNORMAL
NITRITE, POC: ABNORMAL
PH, POC: 5
PROTEIN, POC: ABNORMAL
SPECIFIC GRAVITY, POC: >=1.03
UROBILINOGEN, POC: 0.2

## 2018-01-29 PROCEDURE — 81003 URINALYSIS AUTO W/O SCOPE: CPT | Performed by: UROLOGY

## 2018-01-29 NOTE — TELEPHONE ENCOUNTER
Pt dropped off urine for frequent UTI NKDA Pharm: [facility] ua shows:  Color, UA 01/29/2018  3:12 PM Unknown   yellow    Clarity, UA 01/29/2018  3:12 PM Unknown   clear    Glucose, UA POC 01/29/2018  3:12 PM Unknown   neg    Bilirubin, UA 01/29/2018  3:12 PM Unknown   neg    Ketones, UA 01/29/2018  3:12 PM Unknown   neg    Spec Grav, UA 01/29/2018  3:12 PM Unknown   >=1.030    Blood, UA POC 01/29/2018  3:12 PM Unknown   neg    pH, UA 01/29/2018  3:12 PM Unknown   5.0    Protein, UA POC 01/29/2018  3:12 PM Unknown   30 mg/dL    Urobilinogen, UA 01/29/2018  3:12 PM Unknown   0.2    Leukocytes, UA 01/29/2018  3:12 PM Unknown   small    Nitrite, UA 01/29/2018  3:12 PM Unknown   neg      Would you like a culture?  Please advise

## 2018-01-31 LAB — URINE CULTURE, ROUTINE: NORMAL

## 2018-02-26 ENCOUNTER — OFFICE VISIT (OUTPATIENT)
Dept: UROLOGY | Age: 68
End: 2018-02-26

## 2018-02-26 ENCOUNTER — TELEPHONE (OUTPATIENT)
Dept: UROLOGY | Age: 68
End: 2018-02-26

## 2018-02-26 VITALS
DIASTOLIC BLOOD PRESSURE: 66 MMHG | SYSTOLIC BLOOD PRESSURE: 100 MMHG | BODY MASS INDEX: 27.46 KG/M2 | HEIGHT: 63 IN | WEIGHT: 155 LBS | HEART RATE: 75 BPM

## 2018-02-26 DIAGNOSIS — R39.89 SENSATION OF PRESSURE IN BLADDER AREA: Primary | ICD-10-CM

## 2018-02-26 DIAGNOSIS — R33.9 URINARY RETENTION: ICD-10-CM

## 2018-02-26 LAB
BILIRUBIN, POC: ABNORMAL
BLOOD URINE, POC: ABNORMAL
CLARITY, POC: ABNORMAL
COLOR, POC: YELLOW
GLUCOSE URINE, POC: ABNORMAL
KETONES, POC: ABNORMAL
LEUKOCYTE EST, POC: ABNORMAL
NITRITE, POC: POSITIVE
PH, POC: 5.5
POST VOID RESIDUAL (PVR): 0 ML
PROTEIN, POC: ABNORMAL
SPECIFIC GRAVITY, POC: 1.02
UROBILINOGEN, POC: 0.2

## 2018-02-26 PROCEDURE — 99214 OFFICE O/P EST MOD 30 MIN: CPT | Performed by: UROLOGY

## 2018-02-26 PROCEDURE — 51798 US URINE CAPACITY MEASURE: CPT | Performed by: UROLOGY

## 2018-02-26 PROCEDURE — 81003 URINALYSIS AUTO W/O SCOPE: CPT | Performed by: UROLOGY

## 2018-02-26 RX ORDER — NITROFURANTOIN 25; 75 MG/1; MG/1
100 CAPSULE ORAL 2 TIMES DAILY
Qty: 40 CAPSULE | Refills: 0 | Status: SHIPPED | OUTPATIENT
Start: 2018-02-26 | End: 2018-03-08

## 2018-02-26 NOTE — PROGRESS NOTES
None     Social History Narrative    None     Family History   Problem Relation Age of Onset    Cancer Mother      LEUKEMIA     Current Outpatient Prescriptions   Medication Sig Dispense Refill    nitrofurantoin, macrocrystal-monohydrate, (MACROBID) 100 MG capsule Take 1 capsule by mouth 2 times daily for 10 days 40 capsule 0    ALPRAZolam (XANAX) 0.25 MG tablet Take 1 tablet by mouth 3 times daily as needed for Sleep or Anxiety . 90 tablet 1    fluticasone (FLONASE) 50 MCG/ACT nasal spray 1 spray by Nasal route daily 1 Bottle 3    aspirin 81 MG tablet Take 81 mg by mouth daily      melatonin 5 MG TABS tablet Take 5 mg by mouth daily      Ascorbic Acid (VITAMIN C) 500 MG tablet Take 500 mg by mouth daily      IRON PO Take by mouth      Calcium Citrate-Vitamin D (CALCIUM + D PO) Take by mouth      Omega-3 Fatty Acids (FISH OIL PO) Take by mouth      Probiotic Product (PROBIOTIC PO) Take by mouth      Misc Natural Products (BEE PROPOLIS PO) Take by mouth      b complex vitamins capsule Take 1 capsule by mouth daily      Specialty Vitamins Products (ECHINACEA C COMPLETE PO) Take by mouth      Naproxen Sodium (ALEVE PO) Take by mouth      BEE POLLEN PO Take by mouth      albuterol sulfate HFA (PROVENTIL HFA) 108 (90 Base) MCG/ACT inhaler Inhale 2 puffs into the lungs every 6 hours as needed for Wheezing 1 Inhaler 3    gabapentin (NEURONTIN) 300 MG capsule TAKE 1 CAPSULE BY MOUTH DAILY  3     No current facility-administered medications for this visit. Patient has no known allergies.   All reviewed and verified by Dr Michael Rose on today's visit    No results found for: PSA, PSADIA  Results for POC orders placed in visit on 02/26/18   POCT Urinalysis No Micro (Auto)   Result Value Ref Range    Color, UA yellow     Clarity, UA cloudt     Glucose, UA POC neg     Bilirubin, UA neg     Ketones, UA neg     Spec Grav, UA 1.025     Blood, UA POC moderate     pH, UA 5.5     Protein, UA  mg/dL Urobilinogen, UA 0.2     Leukocytes, UA large     Nitrite, UA positive    poct post void residual   Result Value Ref Range    post void residual 0 ml    Narrative    A point of care test   Post Void Residual was completed by performing  ultrasound scan of the bladder and  reviewed by Dr Juana Long       Physical Exam  Vitals:    02/26/18 1406   BP: 100/66   Pulse: 75   Weight: 155 lb (70.3 kg)   Height: 5' 3\" (1.6 m)     Constitutional: patient is oriented to person, place, and time. patient appears well-developed. not in distress. Ears: Adequate hearing/no hearing loss  Head: Normocephalic. Atraumatic  Neck: Normal range of motion. Cardiovascular: Normal rate, BP reviewed. sinus rhythm normal blood pressure  Pulmonary/Chest: Normal respiratory effort  no shortness of breath  Abdominal: Not distended. Denies suprapubic discomfort  Urologic Exam  Postvoid residual 0. Urinalysis nitrite positive. Cultures sent   No flank tenderness   Pelvic exam not indicated . Musculoskeletal: Normal range of motion. Patient is ambulatory. Extremities: No edema   Neurological: Cranial nerves intact no deficits   Skin: Skin is warm and dry. No lesions. No rashes or ulcers   Psychiatric:  Alert and oriented ×3 normal affect. Assessment  Acute urinary tract infection  Urine culture  Macrobid 1 p.o. b.i.d. ×20 days  Plan  Follow-up 1 month checks symptoms and urine  May need cystoscopy  Greater than 50% of 25 minutes spent consulting patient face-to-face  Orders Placed This Encounter   Procedures    Urine Culture     Order Specific Question:   Specify (ex-cath, midstream, cysto, etc)?      Answer:   m    POCT Urinalysis No Micro (Auto)    poct post void residual     Orders Placed This Encounter   Medications    nitrofurantoin, macrocrystal-monohydrate, (MACROBID) 100 MG capsule     Sig: Take 1 capsule by mouth 2 times daily for 10 days     Dispense:  40 capsule     Refill:  0     Vic Swain MD       Please note this report has been partially produced using speech recognition software  And may cause contain errors related to that system including grammar, punctuation and spelling as well as words and phrases that may seem inappropriate. If there are questions or concerns please feel free to contact me to clarify.

## 2018-03-01 LAB
ORGANISM: ABNORMAL
URINE CULTURE, ROUTINE: ABNORMAL
URINE CULTURE, ROUTINE: ABNORMAL

## 2018-03-26 ENCOUNTER — OFFICE VISIT (OUTPATIENT)
Dept: UROLOGY | Age: 68
End: 2018-03-26
Payer: MEDICARE

## 2018-03-26 VITALS
HEIGHT: 62 IN | WEIGHT: 155 LBS | HEART RATE: 70 BPM | BODY MASS INDEX: 28.52 KG/M2 | DIASTOLIC BLOOD PRESSURE: 72 MMHG | SYSTOLIC BLOOD PRESSURE: 102 MMHG

## 2018-03-26 DIAGNOSIS — R33.9 URINE RETENTION: Primary | ICD-10-CM

## 2018-03-26 LAB
BILIRUBIN, POC: ABNORMAL
BLOOD URINE, POC: ABNORMAL
CLARITY, POC: ABNORMAL
COLOR, POC: YELLOW
GLUCOSE URINE, POC: ABNORMAL
KETONES, POC: ABNORMAL
LEUKOCYTE EST, POC: ABNORMAL
NITRITE, POC: ABNORMAL
PH, POC: 5
POST VOID RESIDUAL (PVR): 29 ML
PROTEIN, POC: ABNORMAL
SPECIFIC GRAVITY, POC: 1.02
UROBILINOGEN, POC: 0.2

## 2018-03-26 PROCEDURE — 99213 OFFICE O/P EST LOW 20 MIN: CPT | Performed by: UROLOGY

## 2018-03-26 PROCEDURE — 81003 URINALYSIS AUTO W/O SCOPE: CPT | Performed by: UROLOGY

## 2018-03-26 PROCEDURE — 51798 US URINE CAPACITY MEASURE: CPT | Performed by: UROLOGY

## 2018-03-26 NOTE — PROGRESS NOTES
Social History Narrative    None     Family History   Problem Relation Age of Onset    Cancer Mother      LEUKEMIA     Current Outpatient Prescriptions   Medication Sig Dispense Refill    APPLE CIDER VINEGAR PO Take by mouth      ALPRAZolam (XANAX) 0.25 MG tablet Take 1 tablet by mouth 3 times daily as needed for Sleep or Anxiety . 90 tablet 1    albuterol sulfate HFA (PROVENTIL HFA) 108 (90 Base) MCG/ACT inhaler Inhale 2 puffs into the lungs every 6 hours as needed for Wheezing 1 Inhaler 3    gabapentin (NEURONTIN) 300 MG capsule TAKE 1 CAPSULE BY MOUTH DAILY  3    fluticasone (FLONASE) 50 MCG/ACT nasal spray 1 spray by Nasal route daily 1 Bottle 3    aspirin 81 MG tablet Take 81 mg by mouth daily      melatonin 5 MG TABS tablet Take 5 mg by mouth daily      Ascorbic Acid (VITAMIN C) 500 MG tablet Take 500 mg by mouth daily      IRON PO Take by mouth      Calcium Citrate-Vitamin D (CALCIUM + D PO) Take by mouth      Omega-3 Fatty Acids (FISH OIL PO) Take by mouth      Probiotic Product (PROBIOTIC PO) Take by mouth      Misc Natural Products (BEE PROPOLIS PO) Take by mouth      b complex vitamins capsule Take 1 capsule by mouth daily      Specialty Vitamins Products (ECHINACEA C COMPLETE PO) Take by mouth      Naproxen Sodium (ALEVE PO) Take by mouth      BEE POLLEN PO Take by mouth       No current facility-administered medications for this visit. Patient has no known allergies.   All reviewed and verified by Dr Autumn Pruitt on today's visit    No results found for: PSA, PSADIA  Results for POC orders placed in visit on 03/26/18   POCT Urinalysis No Micro (Auto)   Result Value Ref Range    Color, UA yellow     Clarity, UA cloudy     Glucose, UA POC neg     Bilirubin, UA neg     Ketones, UA neg     Spec Grav, UA 1.025     Blood, UA POC trace-intact     pH, UA 5.0     Protein, UA  mg/dL     Urobilinogen, UA 0.2     Leukocytes, UA moderate     Nitrite, UA neg    poct post void residual

## 2018-03-29 LAB
ORGANISM: ABNORMAL
URINE CULTURE, ROUTINE: ABNORMAL
URINE CULTURE, ROUTINE: ABNORMAL

## 2018-03-29 RX ORDER — CIPROFLOXACIN 500 MG/1
500 TABLET, FILM COATED ORAL 2 TIMES DAILY
Qty: 20 TABLET | Refills: 0 | Status: SHIPPED | OUTPATIENT
Start: 2018-03-29 | End: 2018-04-17

## 2018-04-17 ENCOUNTER — PROCEDURE VISIT (OUTPATIENT)
Dept: UROLOGY | Age: 68
End: 2018-04-17
Payer: MEDICARE

## 2018-04-17 VITALS
HEART RATE: 71 BPM | SYSTOLIC BLOOD PRESSURE: 124 MMHG | WEIGHT: 155 LBS | HEIGHT: 63 IN | BODY MASS INDEX: 27.46 KG/M2 | DIASTOLIC BLOOD PRESSURE: 80 MMHG

## 2018-04-17 DIAGNOSIS — R33.9 URINARY RETENTION: Primary | ICD-10-CM

## 2018-04-17 DIAGNOSIS — N39.0 RECURRENT UTI: ICD-10-CM

## 2018-04-17 PROCEDURE — 52000 CYSTOURETHROSCOPY: CPT | Performed by: UROLOGY

## 2018-04-17 PROCEDURE — 99213 OFFICE O/P EST LOW 20 MIN: CPT | Performed by: UROLOGY

## 2018-04-17 PROCEDURE — 81003 URINALYSIS AUTO W/O SCOPE: CPT | Performed by: UROLOGY

## 2018-04-17 RX ORDER — CIPROFLOXACIN 500 MG/1
TABLET, FILM COATED ORAL
Qty: 60 TABLET | Refills: 1 | Status: SHIPPED | OUTPATIENT
Start: 2018-04-17 | End: 2018-05-14 | Stop reason: CLARIF

## 2018-04-17 RX ORDER — CIPROFLOXACIN 500 MG/1
500 TABLET, FILM COATED ORAL ONCE
Qty: 1 TABLET | Refills: 0 | COMMUNITY
Start: 2018-04-17 | End: 2018-04-17

## 2018-05-14 ENCOUNTER — OFFICE VISIT (OUTPATIENT)
Dept: FAMILY MEDICINE CLINIC | Age: 68
End: 2018-05-14
Payer: MEDICARE

## 2018-05-14 ENCOUNTER — HOSPITAL ENCOUNTER (OUTPATIENT)
Dept: ULTRASOUND IMAGING | Age: 68
Discharge: HOME | End: 2018-05-16
Payer: MEDICARE

## 2018-05-14 VITALS
WEIGHT: 160.4 LBS | HEIGHT: 63 IN | HEART RATE: 70 BPM | SYSTOLIC BLOOD PRESSURE: 120 MMHG | OXYGEN SATURATION: 98 % | BODY MASS INDEX: 28.42 KG/M2 | RESPIRATION RATE: 16 BRPM | DIASTOLIC BLOOD PRESSURE: 68 MMHG | TEMPERATURE: 97.8 F

## 2018-05-14 DIAGNOSIS — R60.0 LEG EDEMA, LEFT: ICD-10-CM

## 2018-05-14 DIAGNOSIS — R60.0 LEG EDEMA, LEFT: Primary | ICD-10-CM

## 2018-05-14 PROCEDURE — 93971 EXTREMITY STUDY: CPT

## 2018-05-14 PROCEDURE — 99213 OFFICE O/P EST LOW 20 MIN: CPT | Performed by: INTERNAL MEDICINE

## 2018-05-14 ASSESSMENT — ENCOUNTER SYMPTOMS
ABDOMINAL PAIN: 0
EYE PAIN: 0
BACK PAIN: 0
SHORTNESS OF BREATH: 0

## 2018-05-14 ASSESSMENT — PATIENT HEALTH QUESTIONNAIRE - PHQ9
1. LITTLE INTEREST OR PLEASURE IN DOING THINGS: 0
2. FEELING DOWN, DEPRESSED OR HOPELESS: 0
SUM OF ALL RESPONSES TO PHQ QUESTIONS 1-9: 0
SUM OF ALL RESPONSES TO PHQ9 QUESTIONS 1 & 2: 0

## 2018-05-25 DIAGNOSIS — R73.03 PREDIABETES: ICD-10-CM

## 2018-05-25 DIAGNOSIS — E78.2 MIXED HYPERLIPIDEMIA: Primary | ICD-10-CM

## 2018-05-29 ENCOUNTER — OFFICE VISIT (OUTPATIENT)
Dept: FAMILY MEDICINE CLINIC | Age: 68
End: 2018-05-29
Payer: MEDICARE

## 2018-05-29 VITALS
BODY MASS INDEX: 27.82 KG/M2 | SYSTOLIC BLOOD PRESSURE: 120 MMHG | TEMPERATURE: 97.8 F | DIASTOLIC BLOOD PRESSURE: 76 MMHG | OXYGEN SATURATION: 98 % | HEART RATE: 84 BPM | HEIGHT: 63 IN | WEIGHT: 157 LBS | RESPIRATION RATE: 16 BRPM

## 2018-05-29 DIAGNOSIS — E78.2 MIXED HYPERLIPIDEMIA: ICD-10-CM

## 2018-05-29 DIAGNOSIS — R60.9 EDEMA, UNSPECIFIED TYPE: Primary | ICD-10-CM

## 2018-05-29 DIAGNOSIS — R73.03 PREDIABETES: ICD-10-CM

## 2018-05-29 LAB
ALBUMIN SERPL-MCNC: 4.4 G/DL (ref 3.9–4.9)
ALP BLD-CCNC: 56 U/L (ref 40–130)
ALT SERPL-CCNC: 13 U/L (ref 0–33)
ANION GAP SERPL CALCULATED.3IONS-SCNC: 14 MEQ/L (ref 7–13)
AST SERPL-CCNC: 14 U/L (ref 0–35)
BILIRUB SERPL-MCNC: 0.4 MG/DL (ref 0–1.2)
BUN BLDV-MCNC: 19 MG/DL (ref 8–23)
CALCIUM SERPL-MCNC: 9.5 MG/DL (ref 8.6–10.2)
CHLORIDE BLD-SCNC: 104 MEQ/L (ref 98–107)
CHOLESTEROL, TOTAL: 224 MG/DL (ref 0–199)
CO2: 24 MEQ/L (ref 22–29)
CREAT SERPL-MCNC: 0.78 MG/DL (ref 0.5–0.9)
GFR AFRICAN AMERICAN: >60
GFR NON-AFRICAN AMERICAN: >60
GLOBULIN: 1.9 G/DL (ref 2.3–3.5)
GLUCOSE BLD-MCNC: 92 MG/DL (ref 74–109)
HBA1C MFR BLD: 5.2 % (ref 4.8–5.9)
HDLC SERPL-MCNC: 52 MG/DL (ref 40–59)
LDL CHOLESTEROL CALCULATED: 139 MG/DL (ref 0–129)
POTASSIUM SERPL-SCNC: 4.2 MEQ/L (ref 3.5–5.1)
SODIUM BLD-SCNC: 142 MEQ/L (ref 132–144)
TOTAL PROTEIN: 6.3 G/DL (ref 6.4–8.1)
TRIGL SERPL-MCNC: 165 MG/DL (ref 0–200)

## 2018-05-29 PROCEDURE — 99213 OFFICE O/P EST LOW 20 MIN: CPT | Performed by: INTERNAL MEDICINE

## 2018-05-29 RX ORDER — ALPRAZOLAM 0.25 MG/1
0.25 TABLET ORAL 3 TIMES DAILY PRN
Qty: 90 TABLET | Refills: 1 | Status: SHIPPED | OUTPATIENT
Start: 2018-05-29 | End: 2018-06-29

## 2018-05-29 ASSESSMENT — ENCOUNTER SYMPTOMS
ABDOMINAL PAIN: 0
SHORTNESS OF BREATH: 0
EYE PAIN: 0
BACK PAIN: 0

## 2018-06-14 ENCOUNTER — OFFICE VISIT (OUTPATIENT)
Dept: UROLOGY | Age: 68
End: 2018-06-14
Payer: MEDICARE

## 2018-06-14 ENCOUNTER — HOSPITAL ENCOUNTER (OUTPATIENT)
Dept: GENERAL RADIOLOGY | Age: 68
Discharge: HOME | End: 2018-06-16
Payer: MEDICARE

## 2018-06-14 VITALS
BODY MASS INDEX: 26.46 KG/M2 | WEIGHT: 155 LBS | HEART RATE: 71 BPM | DIASTOLIC BLOOD PRESSURE: 80 MMHG | HEIGHT: 64 IN | SYSTOLIC BLOOD PRESSURE: 120 MMHG

## 2018-06-14 DIAGNOSIS — R31.29 MICROHEMATURIA: ICD-10-CM

## 2018-06-14 DIAGNOSIS — N39.0 RECURRENT UTI: Primary | ICD-10-CM

## 2018-06-14 DIAGNOSIS — N20.0 RENAL CALCULI: ICD-10-CM

## 2018-06-14 LAB
BILIRUBIN, POC: ABNORMAL
BLOOD URINE, POC: ABNORMAL
CLARITY, POC: CLEAR
COLOR, POC: ABNORMAL
GLUCOSE URINE, POC: ABNORMAL
KETONES, POC: ABNORMAL
LEUKOCYTE EST, POC: ABNORMAL
NITRITE, POC: ABNORMAL
PH, POC: 6.5
PROTEIN, POC: ABNORMAL
SPECIFIC GRAVITY, POC: 1.02
UROBILINOGEN, POC: 0.2

## 2018-06-14 PROCEDURE — 99213 OFFICE O/P EST LOW 20 MIN: CPT | Performed by: UROLOGY

## 2018-06-14 PROCEDURE — 81003 URINALYSIS AUTO W/O SCOPE: CPT | Performed by: UROLOGY

## 2018-06-14 PROCEDURE — 74018 RADEX ABDOMEN 1 VIEW: CPT

## 2018-06-16 LAB — URINE CULTURE, ROUTINE: NORMAL

## 2018-09-10 ENCOUNTER — OFFICE VISIT (OUTPATIENT)
Dept: FAMILY MEDICINE CLINIC | Age: 68
End: 2018-09-10
Payer: MEDICARE

## 2018-09-10 VITALS
HEIGHT: 63 IN | WEIGHT: 158.6 LBS | RESPIRATION RATE: 15 BRPM | TEMPERATURE: 96.8 F | HEART RATE: 68 BPM | SYSTOLIC BLOOD PRESSURE: 122 MMHG | OXYGEN SATURATION: 98 % | DIASTOLIC BLOOD PRESSURE: 80 MMHG | BODY MASS INDEX: 28.1 KG/M2

## 2018-09-10 DIAGNOSIS — Z87.891 HISTORY OF TOBACCO ABUSE: ICD-10-CM

## 2018-09-10 DIAGNOSIS — Z12.11 COLON CANCER SCREENING: ICD-10-CM

## 2018-09-10 DIAGNOSIS — Z23 NEED FOR INFLUENZA VACCINATION: ICD-10-CM

## 2018-09-10 DIAGNOSIS — K50.919 CROHN'S DISEASE WITH COMPLICATION, UNSPECIFIED GASTROINTESTINAL TRACT LOCATION (HCC): ICD-10-CM

## 2018-09-10 DIAGNOSIS — R73.03 PREDIABETES: ICD-10-CM

## 2018-09-10 DIAGNOSIS — E78.2 MIXED HYPERLIPIDEMIA: Primary | ICD-10-CM

## 2018-09-10 DIAGNOSIS — Z12.31 ENCOUNTER FOR SCREENING MAMMOGRAM FOR BREAST CANCER: ICD-10-CM

## 2018-09-10 PROCEDURE — 99214 OFFICE O/P EST MOD 30 MIN: CPT | Performed by: FAMILY MEDICINE

## 2018-09-10 PROCEDURE — G0008 ADMIN INFLUENZA VIRUS VAC: HCPCS | Performed by: FAMILY MEDICINE

## 2018-09-10 PROCEDURE — 90662 IIV NO PRSV INCREASED AG IM: CPT | Performed by: FAMILY MEDICINE

## 2018-09-10 RX ORDER — ALPRAZOLAM 0.25 MG/1
TABLET ORAL
COMMUNITY
Start: 2018-08-21 | End: 2018-11-16 | Stop reason: SDUPTHER

## 2018-09-10 NOTE — PROGRESS NOTES
 Ascorbic Acid (VITAMIN C) 500 MG tablet Take 500 mg by mouth daily      IRON PO Take by mouth      Calcium Citrate-Vitamin D (CALCIUM + D PO) Take by mouth      Omega-3 Fatty Acids (FISH OIL PO) Take by mouth      Probiotic Product (PROBIOTIC PO) Take by mouth      Misc Natural Products (BEE PROPOLIS PO) Take by mouth      b complex vitamins capsule Take 1 capsule by mouth daily      Specialty Vitamins Products (ECHINACEA C COMPLETE PO) Take by mouth      Naproxen Sodium (ALEVE PO) Take by mouth      BEE POLLEN PO Take by mouth       No current facility-administered medications for this visit. The patient denies any history of      seizures,             heart attack or KNOWN CAD        or stroke. No chest pain, shortness of breath, paroxysmal nocturnal dyspnea. No nausea, vomiting, diarrhea, hematochezia or melena. No paresthesias or headaches. No dysuria, frequency or hematuria. Last labs  Office Visit on 06/14/2018   Component Date Value Ref Range Status    Color, UA 06/14/2018 pinkish orange   Final    Clarity, UA 06/14/2018 clear   Final    Glucose, UA POC 06/14/2018 neg   Final    Bilirubin, UA 06/14/2018 neg   Final    Ketones, UA 06/14/2018 neg   Final    Spec Grav, UA 06/14/2018 1.025   Final    Blood, UA POC 06/14/2018 large   Final    pH, UA 06/14/2018 6.5   Final    Protein, UA POC 06/14/2018 >=300 mg/dL   Final    Urobilinogen, UA 06/14/2018 0.2   Final    Leukocytes, UA 06/14/2018 small   Final    Nitrite, UA 06/14/2018 neg   Final    Urine Culture, Routine 06/14/2018    Final                    Value:<50,000 CFU/ml of mixed deisi  Multiple organisms isolated, no predominance. Culture  indicates probable contamination. Please review colony count  and clinical indications to determine if a repeat culture is  necessary. No further workup to be done.        Health Maintenance   Topic Date Due    Hepatitis C screen  1950    Low dose CT lung non tender. No masses, guarding or rebound. Normo active bowel sounds. EXTREMITIES:  No edema in any extremity. No cyanosis or clubbing. 2+ dorsalis pedis pulses bilaterally          Assessment & Plan    Diagnosis Orders   1. Mixed hyperlipidemia     2. Prediabetes     3. Crohn's disease with complication, unspecified gastrointestinal tract location ([facility] [address]     4. Encounter for screening mammogram for breast cancer  NEREYDA DIGITAL SCREEN W CAD BILATERAL   5. Need for influenza vaccination  INFLUENZA, HIGH DOSE, 65 YRS +, IM, PF, PREFILL SYR, 0.5ML (FLUZONE HD)     Orders Placed This Encounter   Procedures    NEREYDA DIGITAL SCREEN W CAD BILATERAL     Standing Status:   Future     Standing Expiration Date:   11/10/2019     Order Specific Question:   Reason for exam:     Answer:   screening    INFLUENZA, HIGH DOSE, 65 YRS +, IM, PF, PREFILL SYR, 0.5ML (FLUZONE HD)     No orders of the defined types were placed in this encounter. There are no discontinued medications. No Follow-up on file.   Low cho diet  The 10-year ASCVD risk score (Serina Cat, et al., 2013) is: 6.1%    Values used to calculate the score:      Age: 78 years      Sex: Female      Is Non- : No      Diabetic: No      Tobacco smoker: No      Systolic Blood Pressure: 658 mmHg      Is BP treated: No      HDL Cholesterol: 52 mg/dL      Total Cholesterol: 224 mg/dL  Declines another ct lung screen  aaa screening  BTW 2 non inflamed epidermoid cysts mid below pannus 1-2mm pores  D/w pt signs/sxs necessitating removal  irreg mole also-advised could remove    [place] MD [place]

## 2018-09-26 ENCOUNTER — HOSPITAL ENCOUNTER (OUTPATIENT)
Dept: WOMENS IMAGING | Age: 68
Discharge: HOME | End: 2018-09-28
Payer: MEDICARE

## 2018-09-26 ENCOUNTER — HOSPITAL ENCOUNTER (OUTPATIENT)
Dept: ULTRASOUND IMAGING | Age: 68
Discharge: HOME | End: 2018-09-28
Payer: MEDICARE

## 2018-09-26 DIAGNOSIS — Z12.31 ENCOUNTER FOR SCREENING MAMMOGRAM FOR BREAST CANCER: ICD-10-CM

## 2018-09-26 DIAGNOSIS — Z87.891 HISTORY OF TOBACCO ABUSE: ICD-10-CM

## 2018-09-26 PROCEDURE — 76706 US ABDL AORTA SCREEN AAA: CPT

## 2018-09-26 PROCEDURE — 77063 BREAST TOMOSYNTHESIS BI: CPT

## 2018-11-16 DIAGNOSIS — F41.9 ANXIETY: Primary | ICD-10-CM

## 2018-11-16 RX ORDER — ALPRAZOLAM 0.25 MG/1
0.25 TABLET ORAL 3 TIMES DAILY PRN
Qty: 90 TABLET | Refills: 0 | Status: SHIPPED | OUTPATIENT
Start: 2018-11-16 | End: 2019-02-19 | Stop reason: SDUPTHER

## 2018-12-13 ENCOUNTER — HOSPITAL ENCOUNTER (OUTPATIENT)
Dept: GENERAL RADIOLOGY | Age: 68
Discharge: HOME | End: 2018-12-15
Payer: MEDICARE

## 2018-12-13 ENCOUNTER — OFFICE VISIT (OUTPATIENT)
Dept: UROLOGY | Age: 68
End: 2018-12-13
Payer: MEDICARE

## 2018-12-13 VITALS
HEART RATE: 78 BPM | DIASTOLIC BLOOD PRESSURE: 80 MMHG | BODY MASS INDEX: 26.98 KG/M2 | SYSTOLIC BLOOD PRESSURE: 124 MMHG | HEIGHT: 64 IN | WEIGHT: 158 LBS

## 2018-12-13 DIAGNOSIS — N20.0 RENAL CALCULI: ICD-10-CM

## 2018-12-13 DIAGNOSIS — R31.29 MICROHEMATURIA: Primary | ICD-10-CM

## 2018-12-13 DIAGNOSIS — N20.0 NEPHROLITHIASIS: ICD-10-CM

## 2018-12-13 LAB
BILIRUBIN, POC: ABNORMAL
BLOOD URINE, POC: ABNORMAL
CLARITY, POC: CLEAR
COLOR, POC: YELLOW
GLUCOSE URINE, POC: ABNORMAL
KETONES, POC: ABNORMAL
LEUKOCYTE EST, POC: ABNORMAL
NITRITE, POC: ABNORMAL
PH, POC: 5.5
PROTEIN, POC: ABNORMAL
SPECIFIC GRAVITY, POC: 1.02
UROBILINOGEN, POC: 0.2

## 2018-12-13 PROCEDURE — 74018 RADEX ABDOMEN 1 VIEW: CPT

## 2018-12-13 PROCEDURE — 81003 URINALYSIS AUTO W/O SCOPE: CPT | Performed by: UROLOGY

## 2018-12-13 PROCEDURE — 99213 OFFICE O/P EST LOW 20 MIN: CPT | Performed by: UROLOGY

## 2018-12-13 NOTE — PROGRESS NOTES
POC neg     Bilirubin, UA neg     Ketones, UA neg     Spec Grav, UA 1.025     Blood, UA POC moderate     pH, UA 5.5     Protein, UA POC 100mg     Urobilinogen, UA 0.2     Leukocytes, UA small     Nitrite, UA neg        Physical Exam  Vitals:    12/13/18 1120   BP: 124/80   Pulse: 78   Weight: 158 lb (71.7 kg)   Height: 5' 3.5\" (1.613 m)     Constitutional: patient is oriented to person, place, and time. patient appears well-developed. not in distress. Ears: Adequate hearing/no hearing loss  Head: Normocephalic. Atraumatic  Neck: Normal range of motion. Cardiovascular: Normal rate, BP reviewed. normal rhythm  Pulmonary/Chest: Normal respiratory effort  no wheezing  Abdominal: Not distended. No abdominal discomfort  Urologic Exam  KUB reviewed. Previous CTs and KUBs reviewed. Urinalysis shows pyuria   No evidence of flank tenderness . Musculoskeletal: Normal range of motion. Normal strength. Extremities: No edema no venous stasis disease   Neurological: Cranial nerves intact no deficits   Skin: Skin is warm and dry. No lesions. No rashes or ulcers   Psychiatric:  Alert and oriented ×3 normal affect. Assessment/Medical Necessity-Decision Making  Nephrolithiasis no evidence of renal colic right renal calculus stable in size and position  No stones noted in the left renal fossa  No UTIs over the past 6 months  Hematuria workup was negative  Patient no longer smoking cigarettes smokes E cigarettes  Plan  KUB follow-up in one year  Patient to drop off urine specimen if she suspects UTI  Greater than 50% of 20 minutes spent consulting patient face-to-face  Orders Placed This Encounter   Procedures    POCT Urinalysis No Micro (Auto)    HCHG X-RAY ABDOMEN 1 VW     No orders of the defined types were placed in this encounter.     Zina Moreno MD       Please note this report has been partially produced using speech recognition software  And may cause contain errors related to that system including grammar, punctuation and spelling as well as words and phrases that may seem inappropriate. If there are questions or concerns please feel free to contact me to clarify.

## 2019-01-28 DIAGNOSIS — R94.31 ABNORMAL EKG: ICD-10-CM

## 2019-01-28 DIAGNOSIS — R55 SYNCOPE, UNSPECIFIED SYNCOPE TYPE: Primary | ICD-10-CM

## 2019-01-28 LAB
CHOLESTEROL/HDL RATIO: 5.7
CHOLESTEROL: 223 MG/DL
HDLC SERPL-MCNC: 39 MG/DL
LDL CHOLESTEROL: 148 MG/DL
TRIGL SERPL-MCNC: 402 MG/DL
VLDLC SERPL CALC-MCNC: ABNORMAL MG/DL

## 2019-01-30 ENCOUNTER — CARE COORDINATION (OUTPATIENT)
Dept: CASE MANAGEMENT | Age: 69
End: 2019-01-30

## 2019-02-05 ENCOUNTER — CARE COORDINATION (OUTPATIENT)
Dept: CARE COORDINATION | Age: 69
End: 2019-02-05

## 2019-02-05 ENCOUNTER — OFFICE VISIT (OUTPATIENT)
Dept: FAMILY MEDICINE CLINIC | Age: 69
End: 2019-02-05
Payer: MEDICARE

## 2019-02-05 VITALS
BODY MASS INDEX: 26.46 KG/M2 | TEMPERATURE: 98.5 F | HEIGHT: 64 IN | RESPIRATION RATE: 16 BRPM | HEART RATE: 76 BPM | SYSTOLIC BLOOD PRESSURE: 116 MMHG | DIASTOLIC BLOOD PRESSURE: 78 MMHG | WEIGHT: 155 LBS

## 2019-02-05 DIAGNOSIS — R73.03 PREDIABETES: ICD-10-CM

## 2019-02-05 DIAGNOSIS — R31.9 HEMATURIA, UNSPECIFIED TYPE: ICD-10-CM

## 2019-02-05 DIAGNOSIS — E78.2 MIXED HYPERLIPIDEMIA: ICD-10-CM

## 2019-02-05 DIAGNOSIS — K86.89 PANCREATIC MASS: ICD-10-CM

## 2019-02-05 DIAGNOSIS — R10.9 LEFT FLANK PAIN: ICD-10-CM

## 2019-02-05 DIAGNOSIS — N20.0 KIDNEY STONE ON RIGHT SIDE: Primary | ICD-10-CM

## 2019-02-05 LAB
BILIRUBIN, POC: ABNORMAL
BLOOD URINE, POC: ABNORMAL
CLARITY, POC: ABNORMAL
COLOR, POC: ABNORMAL
GLUCOSE URINE, POC: ABNORMAL
KETONES, POC: ABNORMAL
LEUKOCYTE EST, POC: ABNORMAL
NITRITE, POC: ABNORMAL
PH, POC: 6
PROTEIN, POC: ABNORMAL
SPECIFIC GRAVITY, POC: 1.02
UROBILINOGEN, POC: ABNORMAL

## 2019-02-05 PROCEDURE — 81003 URINALYSIS AUTO W/O SCOPE: CPT | Performed by: FAMILY MEDICINE

## 2019-02-05 PROCEDURE — 99214 OFFICE O/P EST MOD 30 MIN: CPT | Performed by: FAMILY MEDICINE

## 2019-02-05 RX ORDER — FLUTICASONE PROPIONATE 50 MCG
1 SPRAY, SUSPENSION (ML) NASAL DAILY
Qty: 1 BOTTLE | Refills: 3 | Status: SHIPPED | OUTPATIENT
Start: 2019-02-05

## 2019-02-05 RX ORDER — ATORVASTATIN CALCIUM 20 MG/1
TABLET, FILM COATED ORAL
COMMUNITY
End: 2019-08-27 | Stop reason: ALTCHOICE

## 2019-02-07 LAB — URINE CULTURE, ROUTINE: NORMAL

## 2019-02-12 ENCOUNTER — HOSPITAL ENCOUNTER (OUTPATIENT)
Dept: MRI IMAGING | Age: 69
Discharge: HOME | End: 2019-02-14
Payer: MEDICARE

## 2019-02-12 DIAGNOSIS — K86.89 PANCREATIC MASS: ICD-10-CM

## 2019-02-12 PROCEDURE — 74183 MRI ABD W/O CNTR FLWD CNTR: CPT

## 2019-02-12 PROCEDURE — A9577 INJ MULTIHANCE: HCPCS | Performed by: FAMILY MEDICINE

## 2019-02-12 PROCEDURE — 6360000004 HC RX CONTRAST MEDICATION: Performed by: FAMILY MEDICINE

## 2019-02-12 RX ORDER — 0.9 % SODIUM CHLORIDE 0.9 %
50 VIAL (ML) INJECTION ONCE
Status: DISCONTINUED | OUTPATIENT
Start: 2019-02-12 | End: 2019-02-15 | Stop reason: HOSPADM

## 2019-02-12 RX ADMIN — GADOBENATE DIMEGLUMINE 20 ML: 529 INJECTION, SOLUTION INTRAVENOUS at 09:16

## 2019-02-13 ENCOUNTER — HOSPITAL ENCOUNTER (OUTPATIENT)
Dept: NON INVASIVE DIAGNOSTICS | Age: 69
Discharge: HOME | End: 2019-02-13
Payer: MEDICARE

## 2019-02-13 ENCOUNTER — HOSPITAL ENCOUNTER (OUTPATIENT)
Dept: NUCLEAR MEDICINE | Age: 69
Discharge: HOME | End: 2019-02-15
Payer: MEDICARE

## 2019-02-13 DIAGNOSIS — R55 SYNCOPE, UNSPECIFIED SYNCOPE TYPE: ICD-10-CM

## 2019-02-13 DIAGNOSIS — R94.31 ABNORMAL EKG: ICD-10-CM

## 2019-02-13 PROCEDURE — 93225 XTRNL ECG REC<48 HRS REC: CPT

## 2019-02-13 PROCEDURE — 93017 CV STRESS TEST TRACING ONLY: CPT

## 2019-02-13 PROCEDURE — 6360000002 HC RX W HCPCS: Performed by: PHYSICIAN ASSISTANT

## 2019-02-13 PROCEDURE — 2580000003 HC RX 258: Performed by: PHYSICIAN ASSISTANT

## 2019-02-13 PROCEDURE — 78452 HT MUSCLE IMAGE SPECT MULT: CPT

## 2019-02-13 PROCEDURE — A9502 TC99M TETROFOSMIN: HCPCS | Performed by: PHYSICIAN ASSISTANT

## 2019-02-13 PROCEDURE — 93226 XTRNL ECG REC<48 HR SCAN A/R: CPT

## 2019-02-13 PROCEDURE — 3430000000 HC RX DIAGNOSTIC RADIOPHARMACEUTICAL: Performed by: PHYSICIAN ASSISTANT

## 2019-02-13 RX ORDER — SODIUM CHLORIDE 0.9 % (FLUSH) 0.9 %
10 SYRINGE (ML) INJECTION PRN
Status: COMPLETED | OUTPATIENT
Start: 2019-02-13 | End: 2019-02-13

## 2019-02-13 RX ADMIN — TETROFOSMIN 31.8 MILLICURIE: 1.38 INJECTION, POWDER, LYOPHILIZED, FOR SOLUTION INTRAVENOUS at 09:24

## 2019-02-13 RX ADMIN — Medication 10 ML: at 09:24

## 2019-02-13 RX ADMIN — TETROFOSMIN 11.9 MILLICURIE: 1.38 INJECTION, POWDER, LYOPHILIZED, FOR SOLUTION INTRAVENOUS at 08:00

## 2019-02-13 RX ADMIN — Medication 10 ML: at 08:00

## 2019-02-13 RX ADMIN — Medication 10 ML: at 09:23

## 2019-02-13 RX ADMIN — REGADENOSON 0.4 MG: 0.08 INJECTION, SOLUTION INTRAVENOUS at 09:23

## 2019-02-19 ENCOUNTER — OFFICE VISIT (OUTPATIENT)
Dept: FAMILY MEDICINE CLINIC | Age: 69
End: 2019-02-19
Payer: MEDICARE

## 2019-02-19 VITALS
BODY MASS INDEX: 27.03 KG/M2 | HEART RATE: 84 BPM | HEIGHT: 64 IN | TEMPERATURE: 98.8 F | SYSTOLIC BLOOD PRESSURE: 126 MMHG | RESPIRATION RATE: 16 BRPM | DIASTOLIC BLOOD PRESSURE: 70 MMHG

## 2019-02-19 DIAGNOSIS — R73.03 PREDIABETES: ICD-10-CM

## 2019-02-19 DIAGNOSIS — F41.9 ANXIETY: ICD-10-CM

## 2019-02-19 DIAGNOSIS — E78.2 MIXED HYPERLIPIDEMIA: ICD-10-CM

## 2019-02-19 DIAGNOSIS — K86.89 PANCREATIC MASS: Primary | ICD-10-CM

## 2019-02-19 DIAGNOSIS — K50.919 CROHN'S DISEASE WITH COMPLICATION, UNSPECIFIED GASTROINTESTINAL TRACT LOCATION (HCC): ICD-10-CM

## 2019-02-19 PROCEDURE — 99213 OFFICE O/P EST LOW 20 MIN: CPT | Performed by: FAMILY MEDICINE

## 2019-02-19 RX ORDER — ALPRAZOLAM 0.25 MG/1
0.25 TABLET ORAL 3 TIMES DAILY PRN
Qty: 90 TABLET | Refills: 2 | Status: SHIPPED | OUTPATIENT
Start: 2019-02-19 | End: 2019-03-21

## 2019-02-23 PROCEDURE — 93227 XTRNL ECG REC<48 HR R&I: CPT | Performed by: INTERNAL MEDICINE

## 2019-03-04 ENCOUNTER — CARE COORDINATION (OUTPATIENT)
Dept: CARE COORDINATION | Age: 69
End: 2019-03-04

## 2019-03-04 DIAGNOSIS — R73.03 PREDIABETES: ICD-10-CM

## 2019-03-04 DIAGNOSIS — E78.2 MIXED HYPERLIPIDEMIA: ICD-10-CM

## 2019-03-04 DIAGNOSIS — E78.2 MIXED HYPERLIPIDEMIA: Primary | ICD-10-CM

## 2019-03-04 LAB
ALBUMIN SERPL-MCNC: 4.5 G/DL (ref 3.5–4.6)
ALP BLD-CCNC: 60 U/L (ref 40–130)
ALT SERPL-CCNC: 22 U/L (ref 0–33)
ANION GAP SERPL CALCULATED.3IONS-SCNC: 12 MEQ/L (ref 9–15)
ANISOCYTOSIS: ABNORMAL
AST SERPL-CCNC: 20 U/L (ref 0–35)
ATYPICAL LYMPHOCYTE RELATIVE PERCENT: 3 %
BASOPHILS ABSOLUTE: 0.1 K/UL (ref 0–0.2)
BASOPHILS RELATIVE PERCENT: 1 %
BILIRUB SERPL-MCNC: 0.5 MG/DL (ref 0.2–0.7)
BUN BLDV-MCNC: 18 MG/DL (ref 8–23)
CALCIUM SERPL-MCNC: 9.7 MG/DL (ref 8.5–9.9)
CHLORIDE BLD-SCNC: 103 MEQ/L (ref 95–107)
CHOLESTEROL, TOTAL: 188 MG/DL (ref 0–199)
CO2: 27 MEQ/L (ref 20–31)
CREAT SERPL-MCNC: 0.57 MG/DL (ref 0.5–0.9)
EOSINOPHILS ABSOLUTE: 0.2 K/UL (ref 0–0.7)
EOSINOPHILS RELATIVE PERCENT: 3 %
GFR AFRICAN AMERICAN: >60
GFR NON-AFRICAN AMERICAN: >60
GLOBULIN: 2.5 G/DL (ref 2.3–3.5)
GLUCOSE BLD-MCNC: 101 MG/DL (ref 70–99)
HBA1C MFR BLD: 5.5 % (ref 4.8–5.9)
HCT VFR BLD CALC: 43 % (ref 37–47)
HDLC SERPL-MCNC: 54 MG/DL (ref 40–59)
HEMOGLOBIN: 14.5 G/DL (ref 12–16)
LDL CHOLESTEROL CALCULATED: 85 MG/DL (ref 0–129)
LYMPHOCYTES ABSOLUTE: 2.5 K/UL (ref 1–4.8)
LYMPHOCYTES RELATIVE PERCENT: 44 %
MCH RBC QN AUTO: 31.7 PG (ref 27–31.3)
MCHC RBC AUTO-ENTMCNC: 33.9 % (ref 33–37)
MCV RBC AUTO: 93.5 FL (ref 82–100)
MONOCYTES ABSOLUTE: 0.2 K/UL (ref 0.2–0.8)
MONOCYTES RELATIVE PERCENT: 3.8 %
NEUTROPHILS ABSOLUTE: 2.5 K/UL (ref 1.4–6.5)
NEUTROPHILS RELATIVE PERCENT: 46 %
PDW BLD-RTO: 12.8 % (ref 11.5–14.5)
PLATELET # BLD: 184 K/UL (ref 130–400)
PLATELET SLIDE REVIEW: ADEQUATE
POTASSIUM SERPL-SCNC: 4.2 MEQ/L (ref 3.4–4.9)
RBC # BLD: 4.6 M/UL (ref 4.2–5.4)
SLIDE REVIEW: ABNORMAL
SODIUM BLD-SCNC: 142 MEQ/L (ref 135–144)
TOTAL PROTEIN: 7 G/DL (ref 6.3–8)
TRIGL SERPL-MCNC: 247 MG/DL (ref 0–150)
WBC # BLD: 5.4 K/UL (ref 4.8–10.8)

## 2019-03-11 ENCOUNTER — CARE COORDINATION (OUTPATIENT)
Dept: CARE COORDINATION | Age: 69
End: 2019-03-11

## 2019-03-11 ENCOUNTER — OFFICE VISIT (OUTPATIENT)
Dept: FAMILY MEDICINE CLINIC | Age: 69
End: 2019-03-11
Payer: MEDICARE

## 2019-03-11 VITALS
BODY MASS INDEX: 27.03 KG/M2 | DIASTOLIC BLOOD PRESSURE: 68 MMHG | HEART RATE: 80 BPM | RESPIRATION RATE: 16 BRPM | SYSTOLIC BLOOD PRESSURE: 104 MMHG | HEIGHT: 64 IN | TEMPERATURE: 98.5 F

## 2019-03-11 DIAGNOSIS — K86.89 PANCREATIC MASS: ICD-10-CM

## 2019-03-11 DIAGNOSIS — R73.03 PREDIABETES: Primary | ICD-10-CM

## 2019-03-11 DIAGNOSIS — K50.919 CROHN'S DISEASE WITH COMPLICATION, UNSPECIFIED GASTROINTESTINAL TRACT LOCATION (HCC): ICD-10-CM

## 2019-03-11 DIAGNOSIS — E78.2 MIXED HYPERLIPIDEMIA: ICD-10-CM

## 2019-03-11 PROCEDURE — 99213 OFFICE O/P EST LOW 20 MIN: CPT | Performed by: FAMILY MEDICINE

## 2019-03-25 ENCOUNTER — NURSE ONLY (OUTPATIENT)
Dept: UROLOGY | Age: 69
End: 2019-03-25
Payer: MEDICARE

## 2019-03-25 ENCOUNTER — TELEPHONE (OUTPATIENT)
Dept: UROLOGY | Age: 69
End: 2019-03-25

## 2019-03-25 DIAGNOSIS — N39.0 RECURRENT UTI: Primary | ICD-10-CM

## 2019-03-25 DIAGNOSIS — R31.9 HEMATURIA, UNSPECIFIED TYPE: Primary | ICD-10-CM

## 2019-03-25 LAB
BILIRUBIN, POC: ABNORMAL
BLOOD URINE, POC: ABNORMAL
CLARITY, POC: ABNORMAL
COLOR, POC: ABNORMAL
GLUCOSE URINE, POC: ABNORMAL
KETONES, POC: ABNORMAL
LEUKOCYTE EST, POC: ABNORMAL
NITRITE, POC: ABNORMAL
PH, POC: 6
PROTEIN, POC: ABNORMAL
SPECIFIC GRAVITY, POC: 1.01
UROBILINOGEN, POC: 0.2

## 2019-03-25 PROCEDURE — 81003 URINALYSIS AUTO W/O SCOPE: CPT | Performed by: UROLOGY

## 2019-03-28 LAB
ORGANISM: ABNORMAL
URINE CULTURE, ROUTINE: ABNORMAL
URINE CULTURE, ROUTINE: ABNORMAL

## 2019-03-28 RX ORDER — CIPROFLOXACIN 500 MG/1
500 TABLET, FILM COATED ORAL 2 TIMES DAILY
Qty: 14 TABLET | Refills: 0 | Status: SHIPPED | OUTPATIENT
Start: 2019-03-28 | End: 2019-04-16 | Stop reason: ALTCHOICE

## 2019-04-16 ENCOUNTER — OFFICE VISIT (OUTPATIENT)
Dept: UROLOGY | Age: 69
End: 2019-04-16
Payer: MEDICARE

## 2019-04-16 VITALS
WEIGHT: 160 LBS | HEART RATE: 68 BPM | DIASTOLIC BLOOD PRESSURE: 84 MMHG | HEIGHT: 62 IN | BODY MASS INDEX: 29.44 KG/M2 | SYSTOLIC BLOOD PRESSURE: 110 MMHG

## 2019-04-16 DIAGNOSIS — N20.0 NEPHROLITHIASIS: Primary | ICD-10-CM

## 2019-04-16 PROCEDURE — 99213 OFFICE O/P EST LOW 20 MIN: CPT | Performed by: UROLOGY

## 2019-04-16 RX ORDER — TRAMADOL HYDROCHLORIDE 50 MG/1
TABLET ORAL
Refills: 0 | COMMUNITY
Start: 2019-04-08

## 2019-04-16 RX ORDER — ALPRAZOLAM 0.25 MG/1
0.25 TABLET ORAL NIGHTLY PRN
COMMUNITY

## 2019-04-16 RX ORDER — OXYCODONE HYDROCHLORIDE AND ACETAMINOPHEN 5; 325 MG/1; MG/1
TABLET ORAL
Refills: 0 | COMMUNITY
Start: 2019-04-03 | End: 2019-08-27 | Stop reason: ALTCHOICE

## 2019-04-16 NOTE — PROGRESS NOTES
[facility] EVALUATION NOTE                                                 H&P                                                                                                                                                 Reason for Visit  Right renal pelvis stone    History of Present Illness  59-year-old female being worked up for pancreatic mass  Was noted to have right-sided stone which we have known about and been watching for 2-3 years  Recent hematuria workup was negative      Urologic Review of Systems/Symptoms  Denies hematuria  Denies dysuria  Denies incontinence  Denies flank pain  Other Urologic: No issues with renal colic    Review of Systems  Head and neck: No issues/reviewed  Cardiac: No recent issues/reviewed  Pulmonary: No issues/reviewed  Gastrointestinal: No issues/reviewed  Neurologic: No recent issues/reviewed  Extremities: No issues/reviewed  Lymphatics: No lymphadenopathy no change  Genitourinary: See above  Skin: No issues/reviewed  Hospitalization: Being worked up for pancreatic mass  Medications reviewed  All 14 categories of Review of Systems otherwise reviewed no other findings reported.     Past Medical History:   Diagnosis Date    Bruit     RIGHT    Burn     3RD DEGREE    [facility]'s disease ([facility] [address]     Hyperlipidemia     Leukocytosis     Normal cardiac stress test 2013    Osteopenia 2013    Prediabetes      Past Surgical History:   Procedure Laterality Date    APPENDECTOMY  1981    CHOLECYSTECTOMY  1994    COLONOSCOPY  2011    needs 2016 likely- yuzon-hx polyp(hyperplastic)    HYSTERECTOMY  1987    L[address]OTRIPSY  05/11/16     [address]    UPPER GASTROINTESTINAL ENDOSCOPY  04/07/2019     Social History     Socioeconomic History    Marital status:      Spouse name: None    Number of children: None    Years of education: None    Highest education level: None   Occupational History    None   Social Needs    Financial resource strain: None    Food insecurity:     Worry: None     Inability: None    Transportation needs:     Medical: None     Non-medical: None   Tobacco Use    Smoking status: Former Smoker     Packs/day: 1.00     Years: 35.00     Pack years: 35.00     Types: E-Cigarettes, Cigarettes     Start date: 1978     Last attempt to quit: 2013     Years since quittin.8    Smokeless tobacco: Never Used    Tobacco comment: Currently, e-cigarettes. Substance and Sexual Activity    Alcohol use: Yes    Drug use: No    Sexual activity: None   Lifestyle    Physical activity:     Days per week: None     Minutes per session: None    Stress: None   Relationships    Social connections:     Talks on phone: None     Gets together: None     Attends Muslim service: None     Active member of club or organization: None     Attends meetings of clubs or organizations: None     Relationship status: None    Intimate partner violence:     Fear of current or ex partner: None     Emotionally abused: None     Physically abused: None     Forced sexual activity: None   Other Topics Concern    None   Social History Narrative    None     Family History   Problem Relation Age of Onset    Cancer Mother         LEUKEMIA     Current Outpatient Medications   Medication Sig Dispense Refill    traMADol (ULTRAM) 50 MG tablet TAKE 1 TABLET BY MOUTH FOUR TIMES DAILY AS NEEDED FOR PAIN  0    ALPRAZolam (XANAX) 0.25 MG tablet Take 0.25 mg by mouth nightly as needed for Sleep.       atorvastatin (LIPITOR) 20 MG tablet       APPLE CIDER VINEGAR PO Take by mouth      aspirin 81 MG tablet Take 81 mg by mouth daily      melatonin 5 MG TABS tablet Take 5 mg by mouth daily      Ascorbic Acid (VITAMIN C) 500 MG tablet Take 500 mg by mouth daily      IRON PO Take by mouth      Omega-3 Fatty Acids (FISH OIL PO) Take by mouth      Probiotic Product (PROBIOTIC PO) Take by mouth      Misc Natural Products (BEE PROPOLIS PO) Take by mouth      b complex vitamins capsule Take 1 capsule by mouth daily      BEE POLLEN PO Take by mouth      oxyCODONE-acetaminophen (PERCOCET) 5-325 MG per tablet TAKE 1 TABLET BY MOUTH EVERY 4 TO 6 HOURS AS NEEDED FOR PAIN  0    fluticasone (FLONASE) 50 MCG/ACT nasal spray 1 spray by Nasal route daily 1 Bottle 3    albuterol sulfate HFA (PROVENTIL HFA) 108 (90 Base) MCG/ACT inhaler Inhale 2 puffs into the lungs every 6 hours as needed for Wheezing 1 Inhaler 3    Specialty Vitamins Products (ECHINACEA C COMPLETE PO) Take by mouth      Naproxen Sodium (ALEVE PO) Take by mouth       No current facility-administered medications for this visit. Patient has no known allergies. All reviewed and verified by Dr Carolyn Ortega on today's visit    No results found for: PSA, PSADIA  No results found for this visit on 04/16/19. Physical Exam  Vitals:    04/16/19 1012   BP: 110/84   Pulse: 68   Weight: 160 lb (72.6 kg)   Height: 5' 2\" (1.575 m)     Constitutional: patient is oriented to person, place, and time. patient appears well-developed. not in distress. Ears: Adequate hearing/no hearing loss  Head: Normocephalic. Atraumatic  Neck: Normal range of motion. Cardiovascular: Normal rate, BP reviewed. normal rhythm  Pulmonary/Chest: Normal respiratory effort  no wheezing  Abdominal: Not distended. Denies abdominal discomfort  Urologic Exam  CT reviewed. MRI reviewed. Urinalysis not available . Musculoskeletal: Normal range of motion. Ambulatory normal strength. Extremities: No edema   Neurological: Cranial layers intact   Skin: Skin is warm and dry. No lesions. No rashes   Psychiatric:  Normal affect.   Assessment/Medical Necessity-Decision Making  Right renal calculus status unchanged noted on recent CT scan for pancreatic mass being worked up  Plan  No further plans in treating right renal calculus  Continue to monitor with yearly KUBs  KUB one year with follow-up  Greater than 50% of 15 minutes spent consulting patient face-to-face  No orders of the defined types were placed in this encounter. No orders of the defined types were placed in this encounter. Angel Nava MD       Please note this report has been partially produced using speech recognition software  And may cause contain errors related to that system including grammar, punctuation and spelling as well as words and phrases that may seem inappropriate. If there are questions or concerns please feel free to contact me to clarify.

## 2019-08-21 ENCOUNTER — TELEPHONE (OUTPATIENT)
Dept: UROLOGY | Age: 69
End: 2019-08-21

## 2019-08-21 DIAGNOSIS — N20.0 NEPHROLITHIASIS: Primary | ICD-10-CM

## 2019-08-27 ENCOUNTER — HOSPITAL ENCOUNTER (OUTPATIENT)
Dept: GENERAL RADIOLOGY | Age: 69
Discharge: HOME | End: 2019-08-29
Payer: MEDICARE

## 2019-08-27 ENCOUNTER — OFFICE VISIT (OUTPATIENT)
Dept: UROLOGY | Age: 69
End: 2019-08-27
Payer: MEDICARE

## 2019-08-27 VITALS
DIASTOLIC BLOOD PRESSURE: 80 MMHG | SYSTOLIC BLOOD PRESSURE: 130 MMHG | WEIGHT: 156 LBS | BODY MASS INDEX: 28.71 KG/M2 | HEART RATE: 82 BPM | HEIGHT: 62 IN

## 2019-08-27 DIAGNOSIS — N20.0 NEPHROLITHIASIS: ICD-10-CM

## 2019-08-27 DIAGNOSIS — N20.0 KIDNEY STONE: Primary | ICD-10-CM

## 2019-08-27 LAB
BILIRUBIN, POC: ABNORMAL
BLOOD URINE, POC: ABNORMAL
CLARITY, POC: CLEAR
COLOR, POC: YELLOW
GLUCOSE URINE, POC: ABNORMAL
KETONES, POC: ABNORMAL
LEUKOCYTE EST, POC: ABNORMAL
NITRITE, POC: ABNORMAL
PH, POC: 5.5
PROTEIN, POC: ABNORMAL
SPECIFIC GRAVITY, POC: >=1.03
UROBILINOGEN, POC: 0.2

## 2019-08-27 PROCEDURE — 81003 URINALYSIS AUTO W/O SCOPE: CPT | Performed by: UROLOGY

## 2019-08-27 PROCEDURE — 99214 OFFICE O/P EST MOD 30 MIN: CPT | Performed by: UROLOGY

## 2019-08-27 PROCEDURE — 74018 RADEX ABDOMEN 1 VIEW: CPT

## 2019-08-27 RX ORDER — MULTIVIT WITH MINERALS/LUTEIN
1000 TABLET ORAL DAILY
COMMUNITY

## 2019-08-27 NOTE — PROGRESS NOTES
(ULTRAM) 50 MG tablet TAKE 1 TABLET BY MOUTH FOUR TIMES DAILY AS NEEDED FOR PAIN  0    ALPRAZolam (XANAX) 0.25 MG tablet Take 0.25 mg by mouth nightly as needed for Sleep.  fluticasone (FLONASE) 50 MCG/ACT nasal spray 1 spray by Nasal route daily 1 Bottle 3    APPLE CIDER VINEGAR PO Take by mouth      aspirin 81 MG tablet Take 81 mg by mouth daily      melatonin 5 MG TABS tablet Take 5 mg by mouth daily      IRON PO Take by mouth      Omega-3 Fatty Acids (FISH OIL PO) Take by mouth      Probiotic Product (PROBIOTIC PO) Take by mouth      Misc Natural Products (BEE PROPOLIS PO) Take by mouth      b complex vitamins capsule Take 1 capsule by mouth daily      Naproxen Sodium (ALEVE PO) Take by mouth      BEE POLLEN PO Take by mouth      Specialty Vitamins Products (ECHINACEA C COMPLETE PO) Take by mouth       No current facility-administered medications for this visit. Patient has no known allergies. All reviewed and verified by Dr Meryle Milliner on today's visit    No results found for: PSA, PSADIA  Results for POC orders placed in visit on 08/27/19   POCT Urinalysis No Micro (Auto)   Result Value Ref Range    Color, UA yellow     Clarity, UA clear     Glucose, UA POC neg     Bilirubin, UA neg     Ketones, UA neg     Spec Grav, UA >=1.030     Blood, UA POC large     pH, UA 5.5     Protein, UA  mg/dL     Urobilinogen, UA 0.2     Leukocytes, UA small     Nitrite, UA neg        Physical Exam  Vitals:    08/27/19 0907   BP: 130/80   Pulse: 82   Weight: 156 lb (70.8 kg)   Height: 5' 2\" (1.575 m)     Constitutional: patient is oriented to person, place, and time. patient appears well-developed. Not in distress. Ears: Adequate hearing/no hearing loss  Head: Normocephalic. Atraumatic  Neck: Normal range of motion. Cardiovascular: Normal rate, BP reviewed. Normal  Pulmonary/Chest: Normal respiratory effort Not short of breath  Abdominal: Not distended.   Denies abdominal pain  Urologic

## 2019-08-29 LAB — URINE CULTURE, ROUTINE: NORMAL

## 2019-09-25 LAB — SURGICAL PATHOLOGY REPORT: NORMAL

## 2019-09-26 ENCOUNTER — HOSPITAL ENCOUNTER (OUTPATIENT)
Dept: WOMENS IMAGING | Age: 69
Discharge: HOME | End: 2019-09-28
Payer: MEDICARE

## 2019-09-26 DIAGNOSIS — Z12.31 ENCOUNTER FOR SCREENING MAMMOGRAM FOR MALIGNANT NEOPLASM OF BREAST: ICD-10-CM

## 2019-09-26 PROCEDURE — 77063 BREAST TOMOSYNTHESIS BI: CPT

## 2019-12-06 ENCOUNTER — HOSPITAL ENCOUNTER (OUTPATIENT)
Dept: GENERAL RADIOLOGY | Age: 69
Discharge: HOME | End: 2019-12-08
Payer: MEDICARE

## 2019-12-06 ENCOUNTER — OFFICE VISIT (OUTPATIENT)
Dept: UROLOGY | Age: 69
End: 2019-12-06
Payer: MEDICARE

## 2019-12-06 VITALS
SYSTOLIC BLOOD PRESSURE: 130 MMHG | HEIGHT: 63 IN | BODY MASS INDEX: 26.75 KG/M2 | WEIGHT: 151 LBS | DIASTOLIC BLOOD PRESSURE: 70 MMHG | HEART RATE: 70 BPM

## 2019-12-06 DIAGNOSIS — R31.0 GROSS HEMATURIA: ICD-10-CM

## 2019-12-06 DIAGNOSIS — R31.0 GROSS HEMATURIA: Primary | ICD-10-CM

## 2019-12-06 DIAGNOSIS — N20.0 NEPHROLITHIASIS, URIC ACID: ICD-10-CM

## 2019-12-06 LAB
BILIRUBIN, POC: ABNORMAL
BLOOD URINE, POC: ABNORMAL
CLARITY, POC: CLEAR
COLOR, POC: ABNORMAL
GLUCOSE URINE, POC: ABNORMAL
HCT VFR BLD CALC: 43.8 % (ref 37–47)
HEMOGLOBIN: 14.2 G/DL (ref 12–16)
KETONES, POC: ABNORMAL
LEUKOCYTE EST, POC: ABNORMAL
MCH RBC QN AUTO: 31.7 PG (ref 27–31.3)
MCHC RBC AUTO-ENTMCNC: 32.5 % (ref 33–37)
MCV RBC AUTO: 97.6 FL (ref 82–100)
NITRITE, POC: POSITIVE
PDW BLD-RTO: 14.6 % (ref 11.5–14.5)
PH, POC: 5.5
PLATELET # BLD: 347 K/UL (ref 130–400)
PROTEIN, POC: ABNORMAL
RBC # BLD: 4.49 M/UL (ref 4.2–5.4)
SPECIFIC GRAVITY, POC: 1.02
UROBILINOGEN, POC: 1
WBC # BLD: 9.5 K/UL (ref 4.8–10.8)

## 2019-12-06 PROCEDURE — 81003 URINALYSIS AUTO W/O SCOPE: CPT | Performed by: UROLOGY

## 2019-12-06 PROCEDURE — 99213 OFFICE O/P EST LOW 20 MIN: CPT | Performed by: UROLOGY

## 2019-12-06 PROCEDURE — 74018 RADEX ABDOMEN 1 VIEW: CPT

## 2019-12-09 LAB
ORGANISM: ABNORMAL
URINE CULTURE, ROUTINE: ABNORMAL

## 2019-12-09 RX ORDER — SULFAMETHOXAZOLE AND TRIMETHOPRIM 800; 160 MG/1; MG/1
1 TABLET ORAL 2 TIMES DAILY
Qty: 14 TABLET | Refills: 0 | Status: SHIPPED | OUTPATIENT
Start: 2019-12-09 | End: 2019-12-16

## 2020-11-04 ENCOUNTER — HOSPITAL ENCOUNTER (OUTPATIENT)
Dept: WOMENS IMAGING | Age: 70
Discharge: HOME | End: 2020-11-06
Payer: MEDICARE

## 2020-11-04 PROCEDURE — 77063 BREAST TOMOSYNTHESIS BI: CPT

## 2020-12-03 ENCOUNTER — OFFICE VISIT (OUTPATIENT)
Dept: ENDOCRINOLOGY | Age: 70
End: 2020-12-03
Payer: MEDICARE

## 2020-12-03 VITALS
HEIGHT: 62 IN | BODY MASS INDEX: 29.63 KG/M2 | DIASTOLIC BLOOD PRESSURE: 75 MMHG | WEIGHT: 161 LBS | SYSTOLIC BLOOD PRESSURE: 108 MMHG | OXYGEN SATURATION: 96 % | HEART RATE: 80 BPM

## 2020-12-03 PROBLEM — E04.1 RIGHT THYROID NODULE: Status: ACTIVE | Noted: 2020-12-03

## 2020-12-03 PROCEDURE — 99203 OFFICE O/P NEW LOW 30 MIN: CPT | Performed by: INTERNAL MEDICINE

## 2020-12-03 NOTE — PROGRESS NOTES
Subjective:      Patient ID: Angy Michel is a 72 y.o. female. Patient referred here for hyperthyroidism thyroid nodule which was found on carotid ultrasound patient had syncopal episode for  3 months  Patient also has been complaining of hair loss and has been taking biotin 10,000 units daily  Patient also scheduled to see ENT in the next couple of weeks time  Other  This is a new (Hyperthyroidism goiter thyroid nodule) problem. The current episode started more than 1 month ago. The problem has been waxing and waning. Associated symptoms include fatigue and weakness. Pertinent negatives include no chest pain, diaphoresis, neck pain or swollen glands. Associated symptoms comments: Hair loss. Nothing aggravates the symptoms. She has tried nothing for the symptoms. The treatment provided no relief.          Reviewed thyroid ultrasound scan thyroid function test TSH was not available for review    Thyroid uptake and scan reveals 24 hours uptake of 18.4%  Cold nodule on the right side corresponding to the large solid nodule on the recent thyroid ultrasound    Done November 16, 2020  Thyroid ultrasound reveals  Right lobe 4.4 x 2.4 x 2.5 cm  Solid 3.2 x 2.5 cm nodule    Left lobe 3.3 x 1.5 x 1.4 cm    Free T4 was 2.8 from 11/16    Free T3 was 3.0 normal    TSH not available for review      Patient Active Problem List   Diagnosis    Mixed hyperlipidemia    Cro[facility]'s disease ([facility] [address]    Osteopenia    Bruit    Prediabetes    Tobacco use disorder    Cervical spondylosis without myelopathy    Cervical radiculopathy    Nephrolithiasis     Social History     Socioeconomic History    Marital status:      Spouse name: Not on file    Number of children: Not on file    Years of education: Not on file    Highest education level: Not on file   Occupational History    Not on file   Social Needs    Financial resource strain: Not on file    Food insecurity     Worry: Not on file     Inability: Not on file  Transportation needs     Medical: Not on file     Non-medical: Not on file   Tobacco Use    Smoking status: Former Smoker     Packs/day: 1.00     Years: 35.00     Pack years: 35.00     Types: E-Cigarettes, Cigarettes     Start date: 1978     Quit date: 2013     Years since quittin.5    Smokeless tobacco: Never Used    Tobacco comment: Currently, e-cigarettes. Substance and Sexual Activity    Alcohol use:  Yes    Drug use: No    Sexual activity: Not on file   Lifestyle    Physical activity     Days per week: Not on file     Minutes per session: Not on file    Stress: Not on file   Relationships    Social connections     Talks on phone: Not on file     Gets together: Not on file     Attends Shinto service: Not on file     Active member of club or organization: Not on file     Attends meetings of clubs or organizations: Not on file     Relationship status: Not on file    Intimate partner violence     Fear of current or ex partner: Not on file     Emotionally abused: Not on file     Physically abused: Not on file     Forced sexual activity: Not on file   Other Topics Concern    Not on file   Social History Narrative    Not on file     Past Surgical History:   Procedure Laterality Date   [address]  2011    needs 2016 likely- yuzon-hx polyp(hyperplastic)   [facility]  2019    [facility] LYMPHADENECTOMY & DISTAL PANCREATECTOMY    LITHOTRIPSY  16     [address]    UPPER GASTROINTESTINAL ENDOSCOPY  2019     Family History   Problem Relation Age of Onset    Cancer Mother         LEUKEMIA     No Known Allergies      Current Outpatient Medications:     CHOLECALCIFEROL PO, Take by mouth, Disp: , Rfl:     Chelated Potassium 99 MG TABS, Take by mouth, Disp: , Rfl:     TURMERIC CURCUMIN PO, Take by mouth, Disp: , Rfl:   Ascorbic Acid (VITAMIN C) 1000 MG tablet, Take by mouth, Disp: , Rfl:     traMADol (ULTRAM) 50 MG tablet, TAKE 1 TABLET BY MOUTH FOUR TIMES DAILY AS NEEDED FOR PAIN, Disp: , Rfl: 0    ALPRAZolam (XANAX) 0.25 MG tablet, Take 0.25 mg by mouth nightly as needed for Sleep., Disp: , Rfl:     fluticasone (FLONASE) 50 MCG/ACT nasal spray, 1 spray by Nasal route daily, Disp: 1 Bottle, Rfl: 3    APPLE CIDER VINEGAR PO, Take by mouth, Disp: , Rfl:     melatonin 5 MG TABS tablet, Take 5 mg by mouth daily, Disp: , Rfl:     IRON PO, Take by mouth, Disp: , Rfl:     Omega-3 Fatty Acids (FISH OIL PO), Take by mouth, Disp: , Rfl:     Probiotic Product (PROBIOTIC PO), Take by mouth, Disp: , Rfl:     Misc Natural Products (BEE PROPOLIS PO), Take by mouth, Disp: , Rfl:     b complex vitamins capsule, Take 1 capsule by mouth daily, Disp: , Rfl:     Specialty Vitamins Products (ECHINACEA C COMPLETE PO), Take by mouth, Disp: , Rfl:     Naproxen Sodium (ALEVE PO), Take by mouth, Disp: , Rfl:     BEE POLLEN PO, Take by mouth, Disp: , Rfl:         Review of Systems   Constitutional: Positive for fatigue. Negative for diaphoresis. Cardiovascular: Negative for chest pain. Musculoskeletal: Negative for neck pain. Skin:        alopecia   Neurological: Positive for weakness. All other systems reviewed and are negative. Vitals:    12/03/20 1103   BP: 108/75   Pulse: 80   SpO2: 96%   Weight: 161 lb (73 kg)   Height: 5' 2\" (1.575 m)       Objective:   Physical Exam  Vitals signs reviewed. Constitutional:       Appearance: Normal appearance. She is normal weight. HENT:      Head: Normocephalic and atraumatic. Right Ear: External ear normal.      Left Ear: External ear normal.      Nose: Nose normal.      Mouth/Throat:      Mouth: Mucous membranes are moist.   Eyes:      General: No scleral icterus. Right eye: No discharge. Left eye: No discharge.       Conjunctiva/sclera: Conjunctivae normal. Pupils: Pupils are equal, round, and reactive to light. Neck:      Musculoskeletal: Normal range of motion and neck supple. Thyroid: Thyromegaly present. Cardiovascular:      Rate and Rhythm: Normal rate and regular rhythm. Pulmonary:      Breath sounds: Normal breath sounds. Abdominal:      Palpations: Abdomen is soft. Musculoskeletal: Normal range of motion. Skin:     General: Skin is warm. Neurological:      General: No focal deficit present. Mental Status: She is alert and oriented to person, place, and time. Assessment:       Diagnosis Orders   1. Hyperthyroidism  Thyroid Peroxidase Antibody    Thyroid Stimulating Immunoglobulin   2.  Right thyroid nodule  Víctor Mancuso MD, Interventional Radiology, [place]           Plan:      Advised patient to discontinue biotin and repeat thyroid function test in 3 to 4 weeks time  Refer patient to radiology for biopsy of the right thyroid nodule further management to depend on the results of her biopsy either through here or to ENT  More than 50% of 30-minute spent in patient education counseling thank you for the referral Dr. Yue Paniagua This Encounter   Procedures    T4, Free     Standing Status:   Future     Standing Expiration Date:   12/3/2021    TSH with Reflex     Standing Status:   Future     Standing Expiration Date:   12/3/2021    Thyroid Peroxidase Antibody     Standing Status:   Future     Standing Expiration Date:   12/3/2021    Thyroid Stimulating Immunoglobulin     Standing Status:   Future     Standing Expiration Date:   12/3/2021   Courtney Valentin MD, Interventional Radiology, [place]     Referral Priority:   Routine     Referral Type:   Eval and Treat     Referral Reason:   Specialty Services Required     Requested Specialty:   Radiology     Number of Visits Requested:   1             Manisha Leal MD

## 2020-12-13 ASSESSMENT — ENCOUNTER SYMPTOMS
ROS SKIN COMMENTS: ALOPECIA
SWOLLEN GLANDS: 0

## 2021-01-05 ENCOUNTER — HOSPITAL ENCOUNTER (OUTPATIENT)
Dept: PREADMISSION TESTING | Age: 71
Discharge: HOME | End: 2021-01-09
Payer: MEDICARE

## 2021-01-05 ENCOUNTER — NURSE ONLY (OUTPATIENT)
Dept: PRIMARY CARE CLINIC | Age: 71
End: 2021-01-05

## 2021-01-05 VITALS
WEIGHT: 157.38 LBS | DIASTOLIC BLOOD PRESSURE: 81 MMHG | TEMPERATURE: 97 F | OXYGEN SATURATION: 96 % | SYSTOLIC BLOOD PRESSURE: 153 MMHG | RESPIRATION RATE: 16 BRPM | HEART RATE: 61 BPM | HEIGHT: 63 IN | BODY MASS INDEX: 27.89 KG/M2

## 2021-01-05 DIAGNOSIS — Z86.718 HX OF BLOOD CLOTS: Chronic | ICD-10-CM

## 2021-01-05 PROBLEM — I35.1 NONRHEUMATIC AORTIC (VALVE) INSUFFICIENCY: Status: ACTIVE | Noted: 2021-01-05

## 2021-01-05 PROBLEM — F41.9 ANXIETY: Status: ACTIVE | Noted: 2019-01-27

## 2021-01-05 PROBLEM — F10.129 ALCOHOL ABUSE WITH INTOXICATION, UNSPECIFIED (HCC): Status: ACTIVE | Noted: 2019-01-27

## 2021-01-05 LAB
ABO/RH: NORMAL
ANION GAP SERPL CALCULATED.3IONS-SCNC: 12 MEQ/L (ref 9–15)
ANTIBODY SCREEN: NORMAL
BUN BLDV-MCNC: 19 MG/DL (ref 8–23)
CALCIUM SERPL-MCNC: 9.4 MG/DL (ref 8.5–9.9)
CHLORIDE BLD-SCNC: 105 MEQ/L (ref 95–107)
CO2: 26 MEQ/L (ref 20–31)
CREAT SERPL-MCNC: 0.74 MG/DL (ref 0.5–0.9)
EKG ATRIAL RATE: 58 BPM
EKG P AXIS: -18 DEGREES
EKG P-R INTERVAL: 142 MS
EKG Q-T INTERVAL: 464 MS
EKG QRS DURATION: 102 MS
EKG QTC CALCULATION (BAZETT): 455 MS
EKG R AXIS: -32 DEGREES
EKG T AXIS: -65 DEGREES
EKG VENTRICULAR RATE: 58 BPM
GFR AFRICAN AMERICAN: >60
GFR NON-AFRICAN AMERICAN: >60
GLUCOSE BLD-MCNC: 115 MG/DL (ref 70–99)
HCT VFR BLD CALC: 43.2 % (ref 37–47)
HEMOGLOBIN: 13.8 G/DL (ref 12–16)
MCH RBC QN AUTO: 31 PG (ref 27–31.3)
MCHC RBC AUTO-ENTMCNC: 31.9 % (ref 33–37)
MCV RBC AUTO: 97.1 FL (ref 82–100)
PDW BLD-RTO: 13.2 % (ref 11.5–14.5)
PLATELET # BLD: 285 K/UL (ref 130–400)
POTASSIUM SERPL-SCNC: 4 MEQ/L (ref 3.4–4.9)
RBC # BLD: 4.45 M/UL (ref 4.2–5.4)
SODIUM BLD-SCNC: 143 MEQ/L (ref 135–144)
T4 FREE: 1.2 NG/DL (ref 0.84–1.68)
TSH REFLEX: 2.59 UIU/ML (ref 0.44–3.86)
WBC # BLD: 8.2 K/UL (ref 4.8–10.8)

## 2021-01-05 PROCEDURE — 86850 RBC ANTIBODY SCREEN: CPT

## 2021-01-05 PROCEDURE — 93005 ELECTROCARDIOGRAM TRACING: CPT | Performed by: NURSE PRACTITIONER

## 2021-01-05 PROCEDURE — 86901 BLOOD TYPING SEROLOGIC RH(D): CPT

## 2021-01-05 PROCEDURE — 93010 ELECTROCARDIOGRAM REPORT: CPT | Performed by: INTERNAL MEDICINE

## 2021-01-05 PROCEDURE — 85027 COMPLETE CBC AUTOMATED: CPT

## 2021-01-05 PROCEDURE — 86900 BLOOD TYPING SEROLOGIC ABO: CPT

## 2021-01-05 PROCEDURE — 80048 BASIC METABOLIC PNL TOTAL CA: CPT

## 2021-01-05 RX ORDER — SODIUM CHLORIDE, SODIUM LACTATE, POTASSIUM CHLORIDE, CALCIUM CHLORIDE 600; 310; 30; 20 MG/100ML; MG/100ML; MG/100ML; MG/100ML
INJECTION, SOLUTION INTRAVENOUS CONTINUOUS
Status: CANCELLED | OUTPATIENT
Start: 2021-01-12

## 2021-01-05 RX ORDER — SODIUM CHLORIDE 0.9 % (FLUSH) 0.9 %
10 SYRINGE (ML) INJECTION EVERY 12 HOURS SCHEDULED
Status: CANCELLED | OUTPATIENT
Start: 2021-01-12

## 2021-01-05 RX ORDER — LIDOCAINE HYDROCHLORIDE 10 MG/ML
1 INJECTION, SOLUTION EPIDURAL; INFILTRATION; INTRACAUDAL; PERINEURAL
Status: CANCELLED | OUTPATIENT
Start: 2021-01-12 | End: 2021-01-12

## 2021-01-05 RX ORDER — ATORVASTATIN CALCIUM 20 MG/1
20 TABLET, FILM COATED ORAL EVERY EVENING
COMMUNITY

## 2021-01-05 RX ORDER — SODIUM CHLORIDE 0.9 % (FLUSH) 0.9 %
10 SYRINGE (ML) INJECTION PRN
Status: CANCELLED | OUTPATIENT
Start: 2021-01-12

## 2021-01-05 RX ORDER — ASPIRIN 81 MG/1
81 TABLET ORAL DAILY
COMMUNITY

## 2021-01-05 ASSESSMENT — ENCOUNTER SYMPTOMS
WHEEZING: 0
EYES NEGATIVE: 1
VOMITING: 0
DIARRHEA: 0
STRIDOR: 0
ABDOMINAL PAIN: 0
SHORTNESS OF BREATH: 0
ALLERGIC/IMMUNOLOGIC NEGATIVE: 1
CONSTIPATION: 0
BACK PAIN: 1
CHEST TIGHTNESS: 0
NAUSEA: 0
COUGH: 1
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
## 2021-01-08 LAB
MISCELLANEOUS LAB TEST ORDER: NORMAL
MISCELLANEOUS LAB TEST RESULT: NORMAL
MISCELLANEOUS PROMPT 2: NORMAL
WHOPPER PROMPT: NORMAL

## 2021-01-11 ENCOUNTER — ANESTHESIA EVENT (OUTPATIENT)
Dept: OPERATING ROOM | Age: 71
End: 2021-01-11
Payer: MEDICARE

## 2021-01-11 NOTE — ANESTHESIA PRE PROCEDURE
Department of Anesthesiology  Preprocedure Note       Name:  Liat Chacon   Age:  72 y.o.  :  1950                                          MRN:  [id number]         Date:  2021      Surgeon: Enoc Hunter): Elton Gifford MD    Procedure: Procedure(s):  RIGHT MIA THYROIDECTOMY - POSSIBLE TOTAL THYROIDECTOMY. 2.5 HOURS    Medications prior to admission:   Prior to Admission medications    Medication Sig Start Date End Date Taking? Authorizing Provider   atorvastatin (LIPITOR) 20 MG tablet Take 20 mg by mouth every evening    Historical Provider, MD   psyllium (METAMUCIL) 58.6 % packet Take 1 packet by mouth daily    Historical Provider, MD   aspirin 81 MG EC tablet Take 81 mg by mouth daily    Historical Provider, MD   CHOLECALCIFEROL PO Take 5,000 Units by mouth daily     Historical Provider, MD   Chelated Potassium 99 MG TABS Take by mouth daily     Historical Provider, MD   TURMERIC CURCUMIN PO Take by mouth daily     Historical Provider, MD   Ascorbic Acid (VITAMIN C) 1000 MG tablet Take 1,000 mg by mouth daily     Historical Provider, MD   traMADol (ULTRAM) 50 MG tablet TAKE 1 TABLET BY MOUTH FOUR TIMES DAILY AS NEEDED FOR PAIN 19   Historical Provider, MD   ALPRAZolam Kaden Colon) 0.25 MG tablet Take 0.25 mg by mouth nightly as needed for Sleep.     Historical Provider, MD   fluticasone [facility]) 50 MCG/ACT nasal spray 1 spray by Nasal route daily 19   Monico Dozier MD   APPLE CIDER VINEGAR PO Take by mouth daily     Historical Provider, MD   melatonin 5 MG TABS tablet Take 5 mg by mouth nightly     Historical Provider, MD   IRON PO Take by mouth daily     Historical Provider, MD   Omega-3 Fatty Acids (FISH OIL PO) Take by mouth daily     Historical Provider, MD   Probiotic Product (PROBIOTIC PO) Take by mouth daily     Historical Provider, MD   Misc Natural Products (BEE PROPOLIS PO) Take by mouth    Historical Provider, MD b complex vitamins capsule Take 1 capsule by mouth daily    Historical Provider, MD   Specialty Vitamins Products (ECHINACEA C COMPLETE PO) Take by mouth daily     Historical Provider, MD   Naproxen Sodium (ALEVE PO) Take by mouth    Historical Provider, MD   BEE POLLEN PO Take by mouth    Historical Provider, MD       Current medications:    No current facility-administered medications for this encounter. Current Outpatient Medications   Medication Sig Dispense Refill    atorvastatin (LIPITOR) 20 MG tablet Take 20 mg by mouth every evening      psyllium (METAMUCIL) 58.6 % packet Take 1 packet by mouth daily      aspirin 81 MG EC tablet Take 81 mg by mouth daily      CHOLECALCIFEROL PO Take 5,000 Units by mouth daily       Chelated Potassium 99 MG TABS Take by mouth daily       TURMERIC CURCUMIN PO Take by mouth daily       Ascorbic Acid (VITAMIN C) 1000 MG tablet Take 1,000 mg by mouth daily       traMADol (ULTRAM) 50 MG tablet TAKE 1 TABLET BY MOUTH FOUR TIMES DAILY AS NEEDED FOR PAIN  0    ALPRAZolam (XANAX) 0.25 MG tablet Take 0.25 mg by mouth nightly as needed for Sleep.       fluticasone (FLONASE) 50 MCG/ACT nasal spray 1 spray by Nasal route daily 1 Bottle 3    APPLE CIDER VINEGAR PO Take by mouth daily       melatonin 5 MG TABS tablet Take 5 mg by mouth nightly       IRON PO Take by mouth daily       Omega-3 Fatty Acids (FISH OIL PO) Take by mouth daily       Probiotic Product (PROBIOTIC PO) Take by mouth daily       Misc Natural Products (BEE PROPOLIS PO) Take by mouth      b complex vitamins capsule Take 1 capsule by mouth daily      Specialty Vitamins Products (ECHINACEA C COMPLETE PO) Take by mouth daily       Naproxen Sodium (ALEVE PO) Take by mouth      BEE POLLEN PO Take by mouth         Allergies:  No Known Allergies    Problem List:    Patient Active Problem List   Diagnosis Code    Mixed hyperlipidemia E78.2    Cro[facility]'s disease ([facility] K50.90    Osteopenia M85.80 Counseling given: Not Answered  Comment: Currently, e-cigarettes. Vital Signs (Current): There were no vitals filed for this visit. BP Readings from Last 3 Encounters:   01/05/21 (!) 153/81   12/03/20 108/75   12/06/19 130/70       NPO Status:                                                                                 BMI:   Wt Readings from Last 3 Encounters:   01/05/21 157 lb 6 oz (71.4 kg)   12/03/20 161 lb (73 kg)   12/06/19 151 lb (68.5 kg)     There is no height or weight on file to calculate BMI.    CBC:   Lab Results   Component Value Date    WBC 8.2 01/05/2021    RBC 4.45 01/05/2021    RBC 4.18 01/28/2019    HGB 13.8 01/05/2021    HCT 43.2 01/05/2021    MCV 97.1 01/05/2021    RDW 13.2 01/05/2021     01/05/2021       CMP:   Lab Results   Component Value Date     01/05/2021    K 4.0 01/05/2021     01/05/2021    CO2 26 01/05/2021    BUN 19 01/05/2021    CREATININE 0.74 01/05/2021    GFRAA >60.0 01/05/2021    AGRATIO 1.8 01/28/2019    LABGLOM >60.0 01/05/2021    GLUCOSE 115 01/05/2021    GLUCOSE 111 01/28/2019    PROT 7.0 03/04/2019    CALCIUM 9.4 01/05/2021    BILITOT 0.5 03/04/2019    ALKPHOS 60 03/04/2019    AST 20 03/04/2019    ALT 22 03/04/2019       POC Tests: No results for input(s): POCGLU, POCNA, POCK, POCCL, POCBUN, POCHEMO, POCHCT in the last 72 hours.     Coags:   Lab Results   Component Value Date    PROTIME 11.0 01/26/2019    INR 1.00 01/26/2019    APTT 24.5 05/04/2016       HCG (If Applicable): No results found for: PREGTESTUR, PREGSERUM, HCG, HCGQUANT     ABGs:   Lab Results   Component Value Date    PHART 7.32 01/27/2019    PO2ART 71 01/27/2019    REO3SKN 38 01/27/2019    EOA9ENC 20 01/27/2019    BEART -6 01/27/2019    X7ATZOFL 93 01/27/2019        Type & Screen (If Applicable):  No results found for: LABABO, LABRH    Drug/Infectious Status (If Applicable):  No results found for: HIV, HEPCAB

## 2021-01-12 ENCOUNTER — ANESTHESIA (OUTPATIENT)
Dept: OPERATING ROOM | Age: 71
End: 2021-01-12
Payer: MEDICARE

## 2021-01-12 ENCOUNTER — HOSPITAL ENCOUNTER (OUTPATIENT)
Age: 71
Setting detail: OUTPATIENT SURGERY
Discharge: HOME | End: 2021-01-12
Attending: OTOLARYNGOLOGY | Admitting: OTOLARYNGOLOGY
Payer: MEDICARE

## 2021-01-12 VITALS
WEIGHT: 157 LBS | SYSTOLIC BLOOD PRESSURE: 129 MMHG | RESPIRATION RATE: 20 BRPM | DIASTOLIC BLOOD PRESSURE: 61 MMHG | HEART RATE: 69 BPM | BODY MASS INDEX: 28.89 KG/M2 | TEMPERATURE: 97.5 F | HEIGHT: 62 IN | OXYGEN SATURATION: 96 %

## 2021-01-12 VITALS — SYSTOLIC BLOOD PRESSURE: 99 MMHG | DIASTOLIC BLOOD PRESSURE: 58 MMHG | TEMPERATURE: 91.9 F | OXYGEN SATURATION: 98 %

## 2021-01-12 DIAGNOSIS — E04.1 RIGHT THYROID NODULE: Primary | ICD-10-CM

## 2021-01-12 LAB
GLUCOSE BLD-MCNC: 119 MG/DL (ref 60–115)
GLUCOSE BLD-MCNC: 119 MG/DL (ref 60–115)
PERFORMED ON: ABNORMAL
PERFORMED ON: ABNORMAL

## 2021-01-12 PROCEDURE — 2720000010 HC SURG SUPPLY STERILE: Performed by: OTOLARYNGOLOGY

## 2021-01-12 PROCEDURE — 3700000000 HC ANESTHESIA ATTENDED CARE: Performed by: OTOLARYNGOLOGY

## 2021-01-12 PROCEDURE — 2500000003 HC RX 250 WO HCPCS: Performed by: OTOLARYNGOLOGY

## 2021-01-12 PROCEDURE — 2500000003 HC RX 250 WO HCPCS: Performed by: NURSE ANESTHETIST, CERTIFIED REGISTERED

## 2021-01-12 PROCEDURE — 6360000002 HC RX W HCPCS: Performed by: NURSE ANESTHETIST, CERTIFIED REGISTERED

## 2021-01-12 PROCEDURE — 2580000003 HC RX 258: Performed by: NURSE PRACTITIONER

## 2021-01-12 PROCEDURE — 7100000001 HC PACU RECOVERY - ADDTL 15 MIN: Performed by: OTOLARYNGOLOGY

## 2021-01-12 PROCEDURE — 2580000003 HC RX 258: Performed by: NURSE ANESTHETIST, CERTIFIED REGISTERED

## 2021-01-12 PROCEDURE — 3700000001 HC ADD 15 MINUTES (ANESTHESIA): Performed by: OTOLARYNGOLOGY

## 2021-01-12 PROCEDURE — 3600000014 HC SURGERY LEVEL 4 ADDTL 15MIN: Performed by: OTOLARYNGOLOGY

## 2021-01-12 PROCEDURE — 6370000000 HC RX 637 (ALT 250 FOR IP): Performed by: ANESTHESIOLOGY

## 2021-01-12 PROCEDURE — 88307 TISSUE EXAM BY PATHOLOGIST: CPT

## 2021-01-12 PROCEDURE — 2709999900 HC NON-CHARGEABLE SUPPLY: Performed by: OTOLARYNGOLOGY

## 2021-01-12 PROCEDURE — 2580000003 HC RX 258: Performed by: OTOLARYNGOLOGY

## 2021-01-12 PROCEDURE — 6370000000 HC RX 637 (ALT 250 FOR IP): Performed by: OTOLARYNGOLOGY

## 2021-01-12 PROCEDURE — 6360000002 HC RX W HCPCS: Performed by: OTOLARYNGOLOGY

## 2021-01-12 PROCEDURE — 3600000004 HC SURGERY LEVEL 4 BASE: Performed by: OTOLARYNGOLOGY

## 2021-01-12 PROCEDURE — 88331 PATH CONSLTJ SURG 1 BLK 1SPC: CPT

## 2021-01-12 PROCEDURE — 7100000000 HC PACU RECOVERY - FIRST 15 MIN: Performed by: OTOLARYNGOLOGY

## 2021-01-12 PROCEDURE — 7100000010 HC PHASE II RECOVERY - FIRST 15 MIN: Performed by: OTOLARYNGOLOGY

## 2021-01-12 PROCEDURE — 7100000011 HC PHASE II RECOVERY - ADDTL 15 MIN: Performed by: OTOLARYNGOLOGY

## 2021-01-12 RX ORDER — SODIUM CHLORIDE, SODIUM LACTATE, POTASSIUM CHLORIDE, CALCIUM CHLORIDE 600; 310; 30; 20 MG/100ML; MG/100ML; MG/100ML; MG/100ML
INJECTION, SOLUTION INTRAVENOUS CONTINUOUS PRN
Status: DISCONTINUED | OUTPATIENT
Start: 2021-01-12 | End: 2021-01-12 | Stop reason: SDUPTHER

## 2021-01-12 RX ORDER — SODIUM CHLORIDE 0.9 % (FLUSH) 0.9 %
10 SYRINGE (ML) INJECTION EVERY 12 HOURS SCHEDULED
Status: DISCONTINUED | OUTPATIENT
Start: 2021-01-12 | End: 2021-01-12 | Stop reason: HOSPADM

## 2021-01-12 RX ORDER — SODIUM CHLORIDE 0.9 % (FLUSH) 0.9 %
10 SYRINGE (ML) INJECTION PRN
Status: DISCONTINUED | OUTPATIENT
Start: 2021-01-12 | End: 2021-01-12 | Stop reason: HOSPADM

## 2021-01-12 RX ORDER — SODIUM CHLORIDE 0.9 % (FLUSH) 0.9 %
10 SYRINGE (ML) INJECTION PRN
Status: CANCELLED | OUTPATIENT
Start: 2021-01-12

## 2021-01-12 RX ORDER — ONDANSETRON 2 MG/ML
4 INJECTION INTRAMUSCULAR; INTRAVENOUS
Status: DISCONTINUED | OUTPATIENT
Start: 2021-01-12 | End: 2021-01-12 | Stop reason: HOSPADM

## 2021-01-12 RX ORDER — HYDROCODONE BITARTRATE AND ACETAMINOPHEN 5; 325 MG/1; MG/1
1 TABLET ORAL EVERY 6 HOURS PRN
Status: DISCONTINUED | OUTPATIENT
Start: 2021-01-12 | End: 2021-01-12 | Stop reason: HOSPADM

## 2021-01-12 RX ORDER — CEPHALEXIN 500 MG/1
500 CAPSULE ORAL EVERY 8 HOURS SCHEDULED
Status: DISCONTINUED | OUTPATIENT
Start: 2021-01-12 | End: 2021-01-12 | Stop reason: HOSPADM

## 2021-01-12 RX ORDER — PROPOFOL 10 MG/ML
INJECTION, EMULSION INTRAVENOUS PRN
Status: DISCONTINUED | OUTPATIENT
Start: 2021-01-12 | End: 2021-01-12 | Stop reason: SDUPTHER

## 2021-01-12 RX ORDER — SODIUM CHLORIDE, SODIUM LACTATE, POTASSIUM CHLORIDE, CALCIUM CHLORIDE 600; 310; 30; 20 MG/100ML; MG/100ML; MG/100ML; MG/100ML
INJECTION, SOLUTION INTRAVENOUS CONTINUOUS
Status: DISCONTINUED | OUTPATIENT
Start: 2021-01-12 | End: 2021-01-12 | Stop reason: HOSPADM

## 2021-01-12 RX ORDER — FENTANYL CITRATE 50 UG/ML
50 INJECTION, SOLUTION INTRAMUSCULAR; INTRAVENOUS EVERY 10 MIN PRN
Status: DISCONTINUED | OUTPATIENT
Start: 2021-01-12 | End: 2021-01-12 | Stop reason: HOSPADM

## 2021-01-12 RX ORDER — SUCCINYLCHOLINE/SOD CL,ISO/PF 100 MG/5ML
SYRINGE (ML) INTRAVENOUS PRN
Status: DISCONTINUED | OUTPATIENT
Start: 2021-01-12 | End: 2021-01-12 | Stop reason: SDUPTHER

## 2021-01-12 RX ORDER — HYDROCODONE BITARTRATE AND ACETAMINOPHEN 5; 325 MG/1; MG/1
1 TABLET ORAL EVERY 6 HOURS PRN
Qty: 30 TABLET | Refills: 0 | Status: SHIPPED | OUTPATIENT
Start: 2021-01-12 | End: 2021-01-19

## 2021-01-12 RX ORDER — HYDROCODONE BITARTRATE AND ACETAMINOPHEN 5; 325 MG/1; MG/1
1 TABLET ORAL PRN
Status: COMPLETED | OUTPATIENT
Start: 2021-01-12 | End: 2021-01-12

## 2021-01-12 RX ORDER — SODIUM CHLORIDE 0.9 % (FLUSH) 0.9 %
10 SYRINGE (ML) INJECTION EVERY 12 HOURS SCHEDULED
Status: CANCELLED | OUTPATIENT
Start: 2021-01-12

## 2021-01-12 RX ORDER — LIDOCAINE HYDROCHLORIDE 10 MG/ML
1 INJECTION, SOLUTION EPIDURAL; INFILTRATION; INTRACAUDAL; PERINEURAL
Status: DISCONTINUED | OUTPATIENT
Start: 2021-01-12 | End: 2021-01-12 | Stop reason: HOSPADM

## 2021-01-12 RX ORDER — MIDAZOLAM HYDROCHLORIDE 1 MG/ML
INJECTION INTRAMUSCULAR; INTRAVENOUS PRN
Status: DISCONTINUED | OUTPATIENT
Start: 2021-01-12 | End: 2021-01-12 | Stop reason: SDUPTHER

## 2021-01-12 RX ORDER — LIDOCAINE HYDROCHLORIDE AND EPINEPHRINE 10; 10 MG/ML; UG/ML
INJECTION, SOLUTION INFILTRATION; PERINEURAL PRN
Status: DISCONTINUED | OUTPATIENT
Start: 2021-01-12 | End: 2021-01-12 | Stop reason: ALTCHOICE

## 2021-01-12 RX ORDER — WOUND DRESSING ADHESIVE - LIQUID
LIQUID MISCELLANEOUS PRN
Status: DISCONTINUED | OUTPATIENT
Start: 2021-01-12 | End: 2021-01-12 | Stop reason: ALTCHOICE

## 2021-01-12 RX ORDER — METOCLOPRAMIDE HYDROCHLORIDE 5 MG/ML
10 INJECTION INTRAMUSCULAR; INTRAVENOUS
Status: DISCONTINUED | OUTPATIENT
Start: 2021-01-12 | End: 2021-01-12 | Stop reason: HOSPADM

## 2021-01-12 RX ORDER — CEPHALEXIN 500 MG/1
500 CAPSULE ORAL EVERY 8 HOURS SCHEDULED
Qty: 6 CAPSULE | Refills: 0 | Status: SHIPPED | OUTPATIENT
Start: 2021-01-12

## 2021-01-12 RX ORDER — DEXAMETHASONE SODIUM PHOSPHATE 10 MG/ML
INJECTION INTRAMUSCULAR; INTRAVENOUS PRN
Status: DISCONTINUED | OUTPATIENT
Start: 2021-01-12 | End: 2021-01-12 | Stop reason: SDUPTHER

## 2021-01-12 RX ORDER — ONDANSETRON 2 MG/ML
INJECTION INTRAMUSCULAR; INTRAVENOUS PRN
Status: DISCONTINUED | OUTPATIENT
Start: 2021-01-12 | End: 2021-01-12 | Stop reason: SDUPTHER

## 2021-01-12 RX ORDER — GLYCOPYRROLATE 1 MG/5 ML
SYRINGE (ML) INTRAVENOUS PRN
Status: DISCONTINUED | OUTPATIENT
Start: 2021-01-12 | End: 2021-01-12 | Stop reason: SDUPTHER

## 2021-01-12 RX ORDER — DIPHENHYDRAMINE HYDROCHLORIDE 50 MG/ML
12.5 INJECTION INTRAMUSCULAR; INTRAVENOUS
Status: DISCONTINUED | OUTPATIENT
Start: 2021-01-12 | End: 2021-01-12 | Stop reason: HOSPADM

## 2021-01-12 RX ORDER — MEPERIDINE HYDROCHLORIDE 25 MG/ML
12.5 INJECTION INTRAMUSCULAR; INTRAVENOUS; SUBCUTANEOUS EVERY 5 MIN PRN
Status: DISCONTINUED | OUTPATIENT
Start: 2021-01-12 | End: 2021-01-12 | Stop reason: HOSPADM

## 2021-01-12 RX ORDER — FENTANYL CITRATE 50 UG/ML
INJECTION, SOLUTION INTRAMUSCULAR; INTRAVENOUS PRN
Status: DISCONTINUED | OUTPATIENT
Start: 2021-01-12 | End: 2021-01-12 | Stop reason: SDUPTHER

## 2021-01-12 RX ORDER — CEFAZOLIN SODIUM 2 G/50ML
2 SOLUTION INTRAVENOUS ONCE
Status: COMPLETED | OUTPATIENT
Start: 2021-01-12 | End: 2021-01-12

## 2021-01-12 RX ORDER — MAGNESIUM HYDROXIDE 1200 MG/15ML
LIQUID ORAL CONTINUOUS PRN
Status: COMPLETED | OUTPATIENT
Start: 2021-01-12 | End: 2021-01-12

## 2021-01-12 RX ORDER — HYDROCODONE BITARTRATE AND ACETAMINOPHEN 5; 325 MG/1; MG/1
2 TABLET ORAL PRN
Status: COMPLETED | OUTPATIENT
Start: 2021-01-12 | End: 2021-01-12

## 2021-01-12 RX ORDER — LIDOCAINE HYDROCHLORIDE 20 MG/ML
INJECTION, SOLUTION INTRAVENOUS PRN
Status: DISCONTINUED | OUTPATIENT
Start: 2021-01-12 | End: 2021-01-12 | Stop reason: SDUPTHER

## 2021-01-12 RX ADMIN — PROPOFOL 150 MG: 10 INJECTION, EMULSION INTRAVENOUS at 07:31

## 2021-01-12 RX ADMIN — PHENYLEPHRINE HYDROCHLORIDE 50 MCG: 10 INJECTION INTRAVENOUS at 07:55

## 2021-01-12 RX ADMIN — SODIUM CHLORIDE, POTASSIUM CHLORIDE, SODIUM LACTATE AND CALCIUM CHLORIDE: 600; 310; 30; 20 INJECTION, SOLUTION INTRAVENOUS at 06:36

## 2021-01-12 RX ADMIN — PHENYLEPHRINE HYDROCHLORIDE 100 MCG: 10 INJECTION INTRAVENOUS at 07:50

## 2021-01-12 RX ADMIN — DEXAMETHASONE SODIUM PHOSPHATE 10 MG: 10 INJECTION INTRAMUSCULAR; INTRAVENOUS at 07:38

## 2021-01-12 RX ADMIN — CEFAZOLIN SODIUM 2 G: 2 SOLUTION INTRAVENOUS at 07:28

## 2021-01-12 RX ADMIN — SODIUM CHLORIDE, POTASSIUM CHLORIDE, SODIUM LACTATE AND CALCIUM CHLORIDE: 600; 310; 30; 20 INJECTION, SOLUTION INTRAVENOUS at 08:07

## 2021-01-12 RX ADMIN — MIDAZOLAM HYDROCHLORIDE 2 MG: 2 INJECTION, SOLUTION INTRAMUSCULAR; INTRAVENOUS at 07:26

## 2021-01-12 RX ADMIN — FENTANYL CITRATE 50 MCG: 50 INJECTION, SOLUTION INTRAMUSCULAR; INTRAVENOUS at 07:32

## 2021-01-12 RX ADMIN — PHENYLEPHRINE HYDROCHLORIDE 100 MCG: 10 INJECTION INTRAVENOUS at 08:36

## 2021-01-12 RX ADMIN — FENTANYL CITRATE 50 MCG: 50 INJECTION, SOLUTION INTRAMUSCULAR; INTRAVENOUS at 07:43

## 2021-01-12 RX ADMIN — ONDANSETRON 4 MG: 2 INJECTION INTRAMUSCULAR; INTRAVENOUS at 07:38

## 2021-01-12 RX ADMIN — SODIUM CHLORIDE, POTASSIUM CHLORIDE, SODIUM LACTATE AND CALCIUM CHLORIDE: 600; 310; 30; 20 INJECTION, SOLUTION INTRAVENOUS at 07:17

## 2021-01-12 RX ADMIN — Medication 0.2 MG: at 07:52

## 2021-01-12 RX ADMIN — LIDOCAINE HYDROCHLORIDE 75 MG: 20 INJECTION, SOLUTION INTRAVENOUS at 07:31

## 2021-01-12 RX ADMIN — Medication 100 MG: at 07:31

## 2021-01-12 RX ADMIN — HYDROCODONE BITARTRATE AND ACETAMINOPHEN 1 TABLET: 5; 325 TABLET ORAL at 10:24

## 2021-01-12 RX ADMIN — PHENYLEPHRINE HYDROCHLORIDE 100 MCG: 10 INJECTION INTRAVENOUS at 08:08

## 2021-01-12 ASSESSMENT — PULMONARY FUNCTION TESTS
PIF_VALUE: 1
PIF_VALUE: 16
PIF_VALUE: 17
PIF_VALUE: 16
PIF_VALUE: 16
PIF_VALUE: 15
PIF_VALUE: 17
PIF_VALUE: 16
PIF_VALUE: 17
PIF_VALUE: 16
PIF_VALUE: 16
PIF_VALUE: 23
PIF_VALUE: 16
PIF_VALUE: 17
PIF_VALUE: 16
PIF_VALUE: 17
PIF_VALUE: 16
PIF_VALUE: 17
PIF_VALUE: 16
PIF_VALUE: 2
PIF_VALUE: 16
PIF_VALUE: 2
PIF_VALUE: 16
PIF_VALUE: 16
PIF_VALUE: 2
PIF_VALUE: 16
PIF_VALUE: 18
PIF_VALUE: 16
PIF_VALUE: 0
PIF_VALUE: 16
PIF_VALUE: 4
PIF_VALUE: 16
PIF_VALUE: 17
PIF_VALUE: 16
PIF_VALUE: 29
PIF_VALUE: 16
PIF_VALUE: 16
PIF_VALUE: 33
PIF_VALUE: 15
PIF_VALUE: 17
PIF_VALUE: 16
PIF_VALUE: 2
PIF_VALUE: 2
PIF_VALUE: 16
PIF_VALUE: 16
PIF_VALUE: 2
PIF_VALUE: 17

## 2021-01-12 NOTE — ANESTHESIA POSTPROCEDURE EVALUATION
Department of Anesthesiology  Postprocedure Note    Patient: Rolando Mccray  MRN: [id number]  YOB: 1950  Date of evaluation: 1/12/2021  Time:  9:23 AM     Procedure Summary     Date: 01/12/21 Room / Location: [facility] OR 12 / [facility]    Anesthesia Start: 2993 Anesthesia Stop: 0991    Procedure: RIGHT MIA THYROIDECTOMY (Right Neck) Diagnosis: (RIGHT THYROID MASS)    Surgeons: Germán Palm MD Responsible Provider: Aurelio Nolasco MD    Anesthesia Type: general ASA Status: 2          Anesthesia Type: general    Valdemar Phase I: Valdemar Score: 10    Valdemar Phase II:      Last vitals: Reviewed and per EMR flowsheets.        Anesthesia Post Evaluation    Patient location during evaluation: PACU  Patient participation: complete - patient participated  Level of consciousness: awake  Airway patency: patent  Nausea & Vomiting: no nausea and no vomiting  Complications: no  Cardiovascular status: hemodynamically stable  Respiratory status: acceptable  Hydration status: euvolemic
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
## 2021-01-12 NOTE — OP NOTE
[address]                      [address], [address] [facility]                                OPERATIVE REPORT    PATIENT NAME: Landy Posadas                :        1950  MED REC NO:   [id number]                            ROOM:  ACCOUNT NO:   [de-identified]                           ADMIT DATE: 2021  PROVIDER:     Jaylen Alcaraz MD    DATE OF PROCEDURE:  2021    DIAGNOSIS:  Right thyroid mass. OPERATION PERFORMED:  Right thyroid lobectomy with isthmusectomy. SURGEON:  Jaylen Alcaraz MD    ANESTHESIA:  General.    INDICATIONS:  The patient is a 71-year-old female with a significant  history of a prominent right thyroid mass measuring greater than 3 cm  with recommendation for definitive surgical intervention as it is  categorized as a higher risk for thyroid radiograph _____. OPERATIVE PROCEDURE:  The patient was taken to the operating room and  administered general anesthesia and was intubated with a nerve integrity  monitoring tube with application of all appropriate leads and  confirmation of the functionality of the system for the duration of the  operation as listed. The patient had a collar incision marked out and  the skin infiltrated with lidocaine 1%, epinephrine 1:100,000. Following final prep and drape and time-out, the patient underwent  incision down through the skin and platysma. Subplatysmal flaps were  elevated superiorly and inferiorly. The midline raphe was identified  and transected with electrocautery. The strap muscles overlying the  right thyroid lobe were meticulously dissected free from the right  thyroid lobe. The superior vascular pedicle along with the middle  thyroid vein and inferior vascular pedicle were all cross-clamped and  ligated directly at the capsule of the gland, taking care to preserve  the integrity of the parathyroid glands and the recurrent laryngeal  nerve left down in its natural position.   The patient had the gland  mobilized anteromedially. Graf's ligament was severed and the gland  mobilized to the far left side of the trachea where it was transected  with removal of the entire right lobe and the isthmus. The patient had  the wound irrigated with saline. Any bleeding controlled with judicious  use of bipolar cautery. 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 With the information is noted, the wound was closed  after application of a trocar drain secured with a nylon suture. The  patient had the midline raphe closed with Vicryl. The skin was closed  first with buried subcutaneous followed by running subcuticular Vicryl. Benzoin and Steri-Strips were applied and the patient released and taken  to recovery in a stable condition. Estimated blood loss minimal and no  complications. In recovery, voiced normal.  She will be able to be  discharged home today and follow up with me tomorrow for drain removal  with family instructed to contact me sooner with issue.         Yonas Pack MD    D: 01/12/2021 9:34:06       T: 01/12/2021 9:40:50     [email or web address]  Job#: [id number]     Doc#: [id number]    CC:

## 2021-01-12 NOTE — BRIEF OP NOTE
Brief Postoperative Note      Patient: Edwin Mosher  YOB: 1950  MRN: [id number]    Date of Procedure: 1/12/2021    Pre-Op Diagnosis: RIGHT THYROID MASS    Post-Op Diagnosis: Same       Procedure(s):  RIGHT MIA THYROIDECTOMY    Surgeon(s):   Cedric Garcia MD    Assistant:  First Assistant: Tristen Rooney    Anesthesia: General    Estimated Blood Loss (mL): Minimal    Complications: None    Specimens:   ID Type Source Tests Collected by Time Destination   A : right thyroid lobe Tissue Thyroid SURGICAL PATHOLOGY Cedric Garcia MD 1/12/2021 [id number]        Implants:  * No implants in log *      Drains:   Closed/Suction Drain Anterior Neck Bulb 10 [place] (Active)       Findings: follicular lesion on frozen    Electronically signed by Cedric Garcia MD on 1/12/2021 at 9:24 AM

## 2022-10-13 NOTE — TELEPHONE ENCOUNTER
macrobid x 7 days  1 bid    Needs f/u with primary after tx Curvilinear Excision Additional Text (Leave Blank If You Do Not Want): The margin was drawn around the clinically apparent lesion.  A curvilinear shape was then drawn on the skin incorporating the lesion and margins.  Incisions were then made along these lines to the appropriate tissue plane and the lesion was extirpated.

## 2023-02-04 PROBLEM — R79.89 ELEVATED PLATELET COUNT: Status: ACTIVE | Noted: 2023-02-04

## 2023-02-04 PROBLEM — E05.90 THYROTOXICOSIS: Status: ACTIVE | Noted: 2023-02-04

## 2023-02-04 PROBLEM — R31.9 HEMATURIA: Status: ACTIVE | Noted: 2023-02-04

## 2023-02-04 PROBLEM — K86.89 PANCREATIC MASS (HHS-HCC): Status: ACTIVE | Noted: 2023-02-04

## 2023-02-04 PROBLEM — K86.1 CHRONIC PANCREATITIS (MULTI): Status: ACTIVE | Noted: 2023-02-04

## 2023-02-04 PROBLEM — I51.9 MILD DIASTOLIC DYSFUNCTION: Status: ACTIVE | Noted: 2023-02-04

## 2023-02-04 PROBLEM — J30.2 SEASONAL ALLERGIES: Status: ACTIVE | Noted: 2023-02-04

## 2023-02-04 PROBLEM — N39.0 URINARY TRACT INFECTION: Status: ACTIVE | Noted: 2023-02-04

## 2023-02-04 PROBLEM — E04.1 THYROID NODULE: Status: ACTIVE | Noted: 2023-02-04

## 2023-02-04 PROBLEM — R11.2 NAUSEA AND/OR VOMITING: Status: ACTIVE | Noted: 2023-02-04

## 2023-02-04 PROBLEM — E04.9 GOITER: Status: ACTIVE | Noted: 2023-02-04

## 2023-02-04 PROBLEM — R30.0 DYSURIA: Status: ACTIVE | Noted: 2023-02-04

## 2023-02-04 PROBLEM — R94.31 ABNORMAL EKG: Status: ACTIVE | Noted: 2023-02-04

## 2023-02-04 PROBLEM — B37.2 CANDIDAL INTERTRIGO: Status: ACTIVE | Noted: 2023-02-04

## 2023-02-04 PROBLEM — J90 PLEURAL EFFUSION: Status: ACTIVE | Noted: 2023-02-04

## 2023-02-04 PROBLEM — M62.838 CERVICAL PARASPINAL MUSCLE SPASM: Status: ACTIVE | Noted: 2023-02-04

## 2023-02-04 PROBLEM — F32.A DEPRESSION: Status: ACTIVE | Noted: 2023-02-04

## 2023-02-04 PROBLEM — F32.0 DEPRESSION, MAJOR, SINGLE EPISODE, MILD (CMS-HCC): Status: ACTIVE | Noted: 2023-02-04

## 2023-02-04 PROBLEM — Z97.8 USES FEEDING TUBE: Status: ACTIVE | Noted: 2023-02-04

## 2023-02-04 PROBLEM — Z90.81 POST-SPLENECTOMY: Status: ACTIVE | Noted: 2023-02-04

## 2023-02-04 PROBLEM — F41.9 ANXIETY: Status: ACTIVE | Noted: 2023-02-04

## 2023-02-04 PROBLEM — R13.10 ODYNOPHAGIA: Status: ACTIVE | Noted: 2023-02-04

## 2023-02-04 PROBLEM — R10.9 ABDOMINAL WALL PAIN: Status: ACTIVE | Noted: 2023-02-04

## 2023-02-04 PROBLEM — D49.0: Status: ACTIVE | Noted: 2023-02-04

## 2023-02-04 PROBLEM — C73 PAPILLARY THYROID CARCINOMA (MULTI): Status: ACTIVE | Noted: 2023-02-04

## 2023-02-04 PROBLEM — M06.9 RHEUMATOID ARTHRITIS (MULTI): Status: ACTIVE | Noted: 2023-02-04

## 2023-02-04 PROBLEM — I35.1 MILD AORTIC INSUFFICIENCY: Status: ACTIVE | Noted: 2023-02-04

## 2023-02-04 PROBLEM — I26.99 PULMONARY EMBOLISM (MULTI): Status: ACTIVE | Noted: 2023-02-04

## 2023-02-04 PROBLEM — I51.7 CARDIOMEGALY: Status: ACTIVE | Noted: 2023-02-04

## 2023-02-04 PROBLEM — L57.0 ACTINIC KERATOSIS: Status: ACTIVE | Noted: 2023-02-04

## 2023-02-04 PROBLEM — I25.10 ATHSCL HEART DISEASE OF NATIVE CORONARY ARTERY W/O ANG PCTRS: Status: ACTIVE | Noted: 2023-02-04

## 2023-02-04 PROBLEM — N20.0 BILATERAL KIDNEY STONES: Status: ACTIVE | Noted: 2023-02-04

## 2023-02-04 PROBLEM — H66.90 ACUTE OTITIS MEDIA: Status: ACTIVE | Noted: 2023-02-04

## 2023-02-04 PROBLEM — G47.9 SLEEP DIFFICULTIES: Status: ACTIVE | Noted: 2023-02-04

## 2023-02-04 PROBLEM — C15.9 ESOPHAGEAL ADENOCARCINOMA (MULTI): Status: ACTIVE | Noted: 2023-02-04

## 2023-02-04 PROBLEM — H53.8 BLURRED VISION, RIGHT EYE: Status: ACTIVE | Noted: 2023-02-04

## 2023-02-04 PROBLEM — C73 THYROID CANCER (MULTI): Status: ACTIVE | Noted: 2023-02-04

## 2023-02-04 PROBLEM — K21.9 GERD (GASTROESOPHAGEAL REFLUX DISEASE): Status: ACTIVE | Noted: 2023-02-04

## 2023-02-04 PROBLEM — M54.2 TRIGGER POINT OF NECK: Status: ACTIVE | Noted: 2023-02-04

## 2023-02-04 PROBLEM — E66.3 OVERWEIGHT WITH BODY MASS INDEX (BMI) OF 25 TO 25.9 IN ADULT: Status: ACTIVE | Noted: 2023-02-04

## 2023-02-04 PROBLEM — R09.A2 GLOBUS SENSATION: Status: ACTIVE | Noted: 2023-02-04

## 2023-02-04 PROBLEM — C15.5: Status: ACTIVE | Noted: 2023-02-04

## 2023-02-04 PROBLEM — R73.9 HYPERGLYCEMIA: Status: ACTIVE | Noted: 2023-02-04

## 2023-02-04 RX ORDER — VITAMIN B COMPLEX
1 CAPSULE ORAL DAILY
COMMUNITY

## 2023-02-04 RX ORDER — FLUTICASONE PROPIONATE 50 MCG
1 SPRAY, SUSPENSION (ML) NASAL DAILY
COMMUNITY
Start: 2021-05-06 | End: 2023-03-20 | Stop reason: ALTCHOICE

## 2023-02-04 RX ORDER — CHOLECALCIFEROL (VITAMIN D3) 125 MCG
125 CAPSULE ORAL DAILY
COMMUNITY

## 2023-02-04 RX ORDER — ALPRAZOLAM 0.25 MG/1
0.25 TABLET ORAL 3 TIMES DAILY PRN
COMMUNITY
End: 2023-04-18 | Stop reason: SDUPTHER

## 2023-02-04 RX ORDER — TURMERIC ROOT EXTRACT 500 MG
1 TABLET ORAL DAILY
COMMUNITY

## 2023-02-04 RX ORDER — OMEPRAZOLE 40 MG/1
1 CAPSULE, DELAYED RELEASE ORAL 2 TIMES DAILY
COMMUNITY
End: 2023-03-20 | Stop reason: ALTCHOICE

## 2023-02-04 RX ORDER — KETOCONAZOLE 20 MG/G
CREAM TOPICAL
COMMUNITY
Start: 2019-08-28

## 2023-02-04 RX ORDER — IBUPROFEN 100 MG/5ML
1 SUSPENSION, ORAL (FINAL DOSE FORM) ORAL 2 TIMES DAILY
COMMUNITY

## 2023-02-04 RX ORDER — METOCLOPRAMIDE 10 MG/1
10 TABLET ORAL 4 TIMES DAILY
COMMUNITY
Start: 2022-10-12 | End: 2023-03-20 | Stop reason: ALTCHOICE

## 2023-02-04 RX ORDER — ASPIRIN 81 MG/1
1 TABLET ORAL DAILY
COMMUNITY
End: 2023-03-20 | Stop reason: ALTCHOICE

## 2023-02-04 RX ORDER — SERTRALINE HYDROCHLORIDE 100 MG/1
1 TABLET, FILM COATED ORAL DAILY
COMMUNITY
Start: 2022-03-15 | End: 2023-03-17

## 2023-02-04 RX ORDER — MELATONIN 5 MG
1 CAPSULE ORAL NIGHTLY
COMMUNITY

## 2023-02-04 RX ORDER — TRAMADOL HYDROCHLORIDE 50 MG/1
50 TABLET ORAL
COMMUNITY
Start: 2022-08-31 | End: 2023-06-28 | Stop reason: SDUPTHER

## 2023-02-04 RX ORDER — CIDER VINEGAR 300 MG
TABLET ORAL
COMMUNITY

## 2023-02-04 RX ORDER — MAGNESIUM 250 MG
1 TABLET ORAL DAILY
COMMUNITY
Start: 2020-03-11

## 2023-02-04 RX ORDER — PSYLLIUM HUSK 0.4 G
CAPSULE ORAL
COMMUNITY

## 2023-02-04 RX ORDER — POTASSIUM GLUCONATE 595(99)MG
1 TABLET, EXTENDED RELEASE ORAL DAILY
COMMUNITY

## 2023-02-04 RX ORDER — ATORVASTATIN CALCIUM 20 MG/1
1 TABLET, FILM COATED ORAL NIGHTLY
COMMUNITY
Start: 2020-09-15 | End: 2023-09-19

## 2023-02-04 RX ORDER — CALCIUM/MAGNESIUM/ZINC 333-133 MG
1 TABLET ORAL DAILY
COMMUNITY

## 2023-02-04 RX ORDER — ONDANSETRON 4 MG/1
1 TABLET, FILM COATED ORAL EVERY 6 HOURS PRN
COMMUNITY
Start: 2022-07-06 | End: 2023-03-20 | Stop reason: ALTCHOICE

## 2023-03-17 DIAGNOSIS — F41.9 ANXIETY: ICD-10-CM

## 2023-03-17 DIAGNOSIS — F32.0 DEPRESSION, MAJOR, SINGLE EPISODE, MILD (CMS-HCC): ICD-10-CM

## 2023-03-17 RX ORDER — SERTRALINE HYDROCHLORIDE 100 MG/1
TABLET, FILM COATED ORAL
Qty: 30 TABLET | Refills: 5 | Status: SHIPPED | OUTPATIENT
Start: 2023-03-17

## 2023-03-20 ENCOUNTER — OFFICE VISIT (OUTPATIENT)
Dept: PRIMARY CARE | Facility: CLINIC | Age: 73
End: 2023-03-20
Payer: MEDICARE

## 2023-03-20 VITALS
OXYGEN SATURATION: 95 % | TEMPERATURE: 97.5 F | RESPIRATION RATE: 16 BRPM | HEART RATE: 68 BPM | BODY MASS INDEX: 21.53 KG/M2 | HEIGHT: 62 IN | DIASTOLIC BLOOD PRESSURE: 82 MMHG | SYSTOLIC BLOOD PRESSURE: 148 MMHG | WEIGHT: 117 LBS

## 2023-03-20 DIAGNOSIS — I25.10 ATHSCL HEART DISEASE OF NATIVE CORONARY ARTERY W/O ANG PCTRS: ICD-10-CM

## 2023-03-20 DIAGNOSIS — K86.1 CHRONIC PANCREATITIS, UNSPECIFIED PANCREATITIS TYPE (MULTI): ICD-10-CM

## 2023-03-20 DIAGNOSIS — M54.12 CERVICAL RADICULOPATHY: ICD-10-CM

## 2023-03-20 DIAGNOSIS — Z12.31 ENCOUNTER FOR SCREENING MAMMOGRAM FOR BREAST CANCER: ICD-10-CM

## 2023-03-20 DIAGNOSIS — G47.9 SLEEP DIFFICULTIES: ICD-10-CM

## 2023-03-20 DIAGNOSIS — F32.0 DEPRESSION, MAJOR, SINGLE EPISODE, MILD (CMS-HCC): ICD-10-CM

## 2023-03-20 DIAGNOSIS — R05.1 ACUTE COUGH: ICD-10-CM

## 2023-03-20 DIAGNOSIS — C15.9 ESOPHAGEAL ADENOCARCINOMA (MULTI): ICD-10-CM

## 2023-03-20 DIAGNOSIS — F41.9 ANXIETY: ICD-10-CM

## 2023-03-20 DIAGNOSIS — K21.9 GASTROESOPHAGEAL REFLUX DISEASE, UNSPECIFIED WHETHER ESOPHAGITIS PRESENT: ICD-10-CM

## 2023-03-20 DIAGNOSIS — R73.9 HYPERGLYCEMIA: ICD-10-CM

## 2023-03-20 DIAGNOSIS — G89.29 TOE PAIN, CHRONIC, LEFT: Primary | ICD-10-CM

## 2023-03-20 DIAGNOSIS — C73 THYROID CANCER (MULTI): ICD-10-CM

## 2023-03-20 DIAGNOSIS — K50.919 CROHN'S DISEASE WITH COMPLICATION, UNSPECIFIED GASTROINTESTINAL TRACT LOCATION (MULTI): ICD-10-CM

## 2023-03-20 DIAGNOSIS — E78.5 DYSLIPIDEMIA: ICD-10-CM

## 2023-03-20 DIAGNOSIS — M79.675 TOE PAIN, CHRONIC, LEFT: Primary | ICD-10-CM

## 2023-03-20 DIAGNOSIS — C15.5 CANCER OF ABDOMINAL ESOPHAGUS (MULTI): ICD-10-CM

## 2023-03-20 DIAGNOSIS — C73 PAPILLARY THYROID CARCINOMA (MULTI): ICD-10-CM

## 2023-03-20 DIAGNOSIS — M06.9 RHEUMATOID ARTHRITIS, INVOLVING UNSPECIFIED SITE, UNSPECIFIED WHETHER RHEUMATOID FACTOR PRESENT (MULTI): ICD-10-CM

## 2023-03-20 DIAGNOSIS — Z00.00 ENCOUNTER FOR MEDICARE ANNUAL WELLNESS EXAM: ICD-10-CM

## 2023-03-20 DIAGNOSIS — J01.00 ACUTE NON-RECURRENT MAXILLARY SINUSITIS: ICD-10-CM

## 2023-03-20 PROBLEM — K50.90 CROHN'S DISEASE (MULTI): Status: ACTIVE | Noted: 2023-03-20

## 2023-03-20 PROCEDURE — 99214 OFFICE O/P EST MOD 30 MIN: CPT | Performed by: FAMILY MEDICINE

## 2023-03-20 PROCEDURE — U0005 INFEC AGEN DETEC AMPLI PROBE: HCPCS

## 2023-03-20 PROCEDURE — 1159F MED LIST DOCD IN RCRD: CPT | Performed by: FAMILY MEDICINE

## 2023-03-20 PROCEDURE — U0004 COV-19 TEST NON-CDC HGH THRU: HCPCS

## 2023-03-20 PROCEDURE — 1160F RVW MEDS BY RX/DR IN RCRD: CPT | Performed by: FAMILY MEDICINE

## 2023-03-20 PROCEDURE — 1157F ADVNC CARE PLAN IN RCRD: CPT | Performed by: FAMILY MEDICINE

## 2023-03-20 PROCEDURE — G0439 PPPS, SUBSEQ VISIT: HCPCS | Performed by: FAMILY MEDICINE

## 2023-03-20 PROCEDURE — 99497 ADVNCD CARE PLAN 30 MIN: CPT | Performed by: FAMILY MEDICINE

## 2023-03-20 PROCEDURE — 1036F TOBACCO NON-USER: CPT | Performed by: FAMILY MEDICINE

## 2023-03-20 PROCEDURE — 1170F FXNL STATUS ASSESSED: CPT | Performed by: FAMILY MEDICINE

## 2023-03-20 PROCEDURE — 87636 SARSCOV2 & INF A&B AMP PRB: CPT

## 2023-03-20 RX ORDER — AMOXICILLIN 875 MG/1
875 TABLET, FILM COATED ORAL 2 TIMES DAILY
Qty: 20 TABLET | Refills: 0 | Status: SHIPPED | OUTPATIENT
Start: 2023-03-20 | End: 2023-03-30

## 2023-03-20 RX ORDER — FERROUS SULFATE 325(65) MG
65 TABLET ORAL
COMMUNITY

## 2023-03-20 ASSESSMENT — PATIENT HEALTH QUESTIONNAIRE - PHQ9
2. FEELING DOWN, DEPRESSED OR HOPELESS: NOT AT ALL
1. LITTLE INTEREST OR PLEASURE IN DOING THINGS: NOT AT ALL
SUM OF ALL RESPONSES TO PHQ9 QUESTIONS 1 AND 2: 0

## 2023-03-20 ASSESSMENT — ACTIVITIES OF DAILY LIVING (ADL)
MANAGING_FINANCES: INDEPENDENT
TAKING_MEDICATION: INDEPENDENT
BATHING: INDEPENDENT
GROCERY_SHOPPING: INDEPENDENT
DOING_HOUSEWORK: INDEPENDENT
DRESSING: INDEPENDENT

## 2023-03-20 ASSESSMENT — ENCOUNTER SYMPTOMS
OCCASIONAL FEELINGS OF UNSTEADINESS: 0
DEPRESSION: 0
LOSS OF SENSATION IN FEET: 0

## 2023-03-20 NOTE — PROGRESS NOTES
Covid vax: x 3  CRC: 9/2019  Mammogram: 11/2020-ordered  Pap: hysterectomy  Lmp: hysterectomy  Hx thyroid /cancer/esophageal    Subjective   Reason for Visit: Sarah Chavez is a 72 y.o. female here for a Medicare Wellness visit.   Went to lift bag r shoulder pain  1mo ago  Felt sore shoulder then  Rare use xanax moderate anxiety    Past Medical, Surgical, and Family History reviewed and updated in chart.    Reviewed all medications by prescribing practitioner or clinical pharmacist (such as prescriptions, OTCs, herbal therapies and supplements) and documented in the medical record.  Medicare Annual Wellness Visit Subsequent, URI (Cough, nasal congestion x 1 week), and Med Refill (Should she stay on Zoloft?)    HPI    - Monday started with a sore throat. Symptoms progressing since then. Now has sore throat, productive cough, chest pain with cough, rhinorrhea. Denies SOB, ear pain, N/V, diarrhea, bloody stool. Taking OTC cough medicine, lozenges which help.   - Boost is too expensive. Drinks premier protein instead but it makes her feel nauseated. Hasn't tried other brands.  - Went to [place] recently. Pulled suitcase up 2/7/23 and has RA shoulder pain rated as a 7/10 severity. 2 weeks later it started radiating to front port with numbness/tingling. Numbness/tingling radiates to the top and front of shoulder to her port and lasts for a couple minutes then goes away. The posterior shoulder pain is constant. Using ice, heat, and massage.   - 2nd and 3rd toes feel painful intermittently for the past 15 years. Unsure about the triggers. Never seen podiatrist  - SK on abdomen  - Following up with Dr. Pathak for the hx of thyroid CA in June    - Immunizations: UTD on influenza, covid x4, pneumonia. Needs Tdap and shingrix  - Mammograms: Needs  - Colorectal cancer screening: UTD from 2021, return in 5 yrs  - Low dose chest CT: Quit smoking cigs 10 years. Smoked for 40-45 years at 1 PPD. Had CT chest abdomen pelvis in  November, no pulm nodules.  - DEXA scan: Not sure when last one was  - Alcohol/tobacco/drugs: Denies x3   - Dental care: UTD  - Vision care: UTD  - Mood: Good    PLAN  - try diff brand of protein shake for nausea  [X] R shoulder xray  [X] podiatry referral  [ ] ? A1c  [x] tdap today  - shingles at pharmacy  [X] mammo (nurse ordered)  [ ] ? Dexa rba addressed      Patient Self Assessment of Health Status  Patient Self Assessment: Good    Nutrition and Exercise  Current Diet: Needs work  Adequate Fluid Intake: Yes  Caffeine: Yes (tea)  Exercise Frequency: Regularly    Functional Ability/Level of Safety  Cognitive Impairment Observed: No cognitive impairment observed    Home Safety Risk Factors: None    Patient Care Team:  Sima Miner MD as PCP - General    HPI  Patient Active Problem List   Diagnosis    Abdominal wall pain    Abnormal EKG    Actinic keratosis    Anxiety    Athscl heart disease of native coronary artery w/o ang pctrs    Bilateral kidney stones    Blurred vision, right eye    Cancer of abdominal esophagus (CMS/HCC)    Candidal intertrigo    Cardiomegaly    Cervical paraspinal muscle spasm    Chronic pancreatitis (CMS/HCC)    Depression, major, single episode, mild (CMS/HCC)    Dysuria    Elevated platelet count    Esophageal adenocarcinoma (CMS/HCC)    GERD (gastroesophageal reflux disease)    Globus sensation    Hematuria    Hyperglycemia    Intraepithelial neoplasm of pancreas    Mild aortic insufficiency    Mild diastolic dysfunction    Nausea and/or vomiting    Odynophagia    Pancreatic mass    Papillary thyroid carcinoma (CMS/HCC)    Pleural effusion    Post-splenectomy    Pulmonary embolism (CMS/HCC)    Rheumatoid arthritis (CMS/HCC)    Seasonal allergies    Sleep difficulties    Goiter    Thyroid cancer (CMS/HCC)    Thyroid nodule    Thyrotoxicosis    Trigger point of neck    Urinary tract infection    Uses feeding tube    Overweight with body mass index (BMI) of 25 to 25.9 in adult     "Crohn's disease (CMS/[facility])    Encounter for Medicare annual wellness exam      Past Surgical History:   Procedure Laterality Date    APPENDECTOMY      CHOLECYSTECTOMY      COLONOSCOPY  09/2019    HYSTERECTOMY      OTHER SURGICAL HISTORY  06/19/2019    Debridement    OTHER SURGICAL HISTORY  06/19/2019    Splenectomy    OTHER SURGICAL HISTORY  11/10/2022    Esophagectomy    PANCREAS SURGERY      SHOULDER SURGERY         Review of Systems  This patient has   NO history of recent Covid nor flu symptoms,  NO Fever nor chills,  NO Chest pain, shortness of breath nor paroxysmal nocturnal dyspnea,  NO Nausea, vomiting, nor diarrhea,  NO Hematochezia nor melena,  NO Dysuria, hematuria, nor new incontinence issues  NO new severe headaches nor neurological complaints,  NO new issues with anxiety nor depression nor new psychiatric complaints,  NO suicidal nor homicidal ideations.     OBJECTIVE:  /82 (BP Location: Right arm, Patient Position: Sitting, BP Cuff Size: Adult)   Pulse 68   Temp 36.4 °C (97.5 °F) (Temporal)   Resp 16   Ht 1.575 m (5' 2\") Comment: 5'2  Wt 53.1 kg (117 lb) Comment: 117 lb  LMP  (LMP Unknown)   SpO2 95%   BMI 21.40 kg/m²      General:  alert, oriented, no acute distress.  No obvious skin rashes noted.   No gait disturbance noted.    Mood is pleasant, not tearful, no signs of emotional distress.  Not appearing intoxicated or altered.   No voiced delusions,   Normal, appropriate behavior.    HEENT: Normocephalic, atraumatic,   Pupils round, reactive to light  Extraocular motions intact and wnl  Tympanic membranes normal    Neck: no nuchal rigidity  No masses palpable.  No carotid bruits.  No thyromegaly.    Respiratory: Equal breath sounds  No wheezes,    rales,    nor rhonchi  No respiratory distress.    Heart: Regular rate and rhythm, no    murmurs  no rubs/gallops    Abdomen: no masses palpable, no rebound nor guarding, no rebound nor guarding.    Extremities: NO cyanosis noted, no " clubbing.   No edema noted.  2+dorsalis pedis pulses.    Normal-not antalgic, steady gait.    Legacy Encounter on 03/14/2023   Component Date Value Ref Range Status    Glucose 03/14/2023 113 (H)  74 - 99 mg/dL Final    Sodium 03/14/2023 141  136 - 145 mmol/L Final    Potassium 03/14/2023 3.9  3.5 - 5.3 mmol/L Final    Chloride 03/14/2023 104  98 - 107 mmol/L Final    Bicarbonate 03/14/2023 30  21 - 32 mmol/L Final    Anion Gap 03/14/2023 11  10 - 20 mmol/L Final    Urea Nitrogen 03/14/2023 13  6 - 23 mg/dL Final    Creatinine 03/14/2023 0.62  0.50 - 1.05 mg/dL Final    GFR Female 03/14/2023 >90  >90 mL/min/1.73m2 Final    Comment:  CALCULATIONS OF ESTIMATED GFR ARE PERFORMED   USING THE 2021 CKD-EPI STUDY REFIT EQUATION   WITHOUT THE RACE VARIABLE FOR THE IDMS-TRACEABLE   CREATININE METHODS.    https://jasn.asnjournals.org/content/early/2021/09/22/ASN.9085633525      Calcium 03/14/2023 9.3  8.6 - 10.3 mg/dL Final    Albumin 03/14/2023 3.8  3.4 - 5.0 g/dL Final    Alkaline Phosphatase 03/14/2023 91  33 - 136 U/L Final    Total Protein 03/14/2023 6.2 (L)  6.4 - 8.2 g/dL Final    AST 03/14/2023 36  9 - 39 U/L Final    Total Bilirubin 03/14/2023 0.8  0.0 - 1.2 mg/dL Final    ALT (SGPT) 03/14/2023 40  7 - 45 U/L Final    Comment:  Patients treated with Sulfasalazine may generate    falsely decreased results for ALT.      Adrenocorticotropic Hormone (ACTH) 03/14/2023 20.3  7.2 - 63.3 pg/mL Final    Comment: INTERPRETIVE INFORMATION: Adrenocorticotropic Hormone  Reference interval based on samples collected between 7 a.m. and   10 a.m.  No reference intervals established for p.m. collections.    Pediatric reference values are the same as adults (Acta Paediatr   Scand 1981;70:341-345).  This assay measures intact ACTH 1-39;   some types of synthetic ACTH and ACTH fragments are not detected   by this assay.  Performed By: [facility]  [address] [place] [id number]  : Mati Winchester,  MD, PhD      TSH 03/14/2023 2.74  0.44 - 3.98 mIU/L Final    Comment:  TSH testing is performed using different testing    methodology at [facility] than at other    [facility] hospitals. Direct result comparisons should    only be made within the same method.      WBC 03/14/2023 5.5  4.4 - 11.3 x10E9/L Final    RBC 03/14/2023 3.97 (L)  4.00 - 5.20 x10E12/L Final    Hemoglobin 03/14/2023 13.0  12.0 - 16.0 g/dL Final    Hematocrit 03/14/2023 38.8  36.0 - 46.0 % Final    MCV 03/14/2023 98  80 - 100 fL Final    MCHC 03/14/2023 33.5  32.0 - 36.0 g/dL Final    Platelets 03/14/2023 232  150 - 450 x10E9/L Final    RDW 03/14/2023 15.5 (H)  11.5 - 14.5 % Final    Neutrophils % 03/14/2023 54.4  40.0 - 80.0 % Final    Immature Granulocytes %, Automated 03/14/2023 0.0  0.0 - 0.9 % Final    Comment:  Immature Granulocyte Count (IG) includes promyelocytes,    myelocytes and metamyelocytes but does not include bands.   Percent differential counts (%) should be interpreted in the   context of the absolute cell counts (cells/L).      Lymphocytes % 03/14/2023 29.2  13.0 - 44.0 % Final    Monocytes % 03/14/2023 12.4  2.0 - 10.0 % Final    Eosinophils % 03/14/2023 3.3  0.0 - 6.0 % Final    Basophils % 03/14/2023 0.7  0.0 - 2.0 % Final    Neutrophils Absolute 03/14/2023 2.98  1.60 - 5.50 x10E9/L Final    Lymphocytes Absolute 03/14/2023 1.60  0.80 - 3.00 x10E9/L Final    Monocytes Absolute 03/14/2023 0.68  0.05 - 0.80 x10E9/L Final    Eosinophils Absolute 03/14/2023 0.18  0.00 - 0.40 x10E9/L Final    Basophils Absolute 03/14/2023 0.04  0.00 - 0.10 x10E9/L Final    Cortisol 03/14/2023 12.9  2.5 - 20.0 ug/dL Final   Legacy Encounter on 01/31/2023   Component Date Value Ref Range Status    WBC 01/31/2023 7.5  4.4 - 11.3 x10E9/L Final    RBC 01/31/2023 3.95 (L)  4.00 - 5.20 x10E12/L Final    Hemoglobin 01/31/2023 12.8  12.0 - 16.0 g/dL Final    Hematocrit 01/31/2023 38.2  36.0 - 46.0 % Final    MCV 01/31/2023 97  80 - 100 fL Final     MCHC 01/31/2023 33.5  32.0 - 36.0 g/dL Final    Platelets 01/31/2023 210  150 - 450 x10E9/L Final    RDW 01/31/2023 15.3 (H)  11.5 - 14.5 % Final    Neutrophils % 01/31/2023 58.2  40.0 - 80.0 % Final    Immature Granulocytes %, Automated 01/31/2023 0.1  0.0 - 0.9 % Final    Comment:  Immature Granulocyte Count (IG) includes promyelocytes,    myelocytes and metamyelocytes but does not include bands.   Percent differential counts (%) should be interpreted in the   context of the absolute cell counts (cells/L).      Lymphocytes % 01/31/2023 27.5  13.0 - 44.0 % Final    Monocytes % 01/31/2023 10.6  2.0 - 10.0 % Final    Eosinophils % 01/31/2023 3.1  0.0 - 6.0 % Final    Basophils % 01/31/2023 0.5  0.0 - 2.0 % Final    Neutrophils Absolute 01/31/2023 4.34  1.60 - 5.50 x10E9/L Final    Lymphocytes Absolute 01/31/2023 2.05  0.80 - 3.00 x10E9/L Final    Monocytes Absolute 01/31/2023 0.79  0.05 - 0.80 x10E9/L Final    Eosinophils Absolute 01/31/2023 0.23  0.00 - 0.40 x10E9/L Final    Basophils Absolute 01/31/2023 0.04  0.00 - 0.10 x10E9/L Final    LD 01/31/2023 193  84 - 246 U/L Final    Cortisol 01/31/2023 7.8  2.5 - 20.0 ug/dL Final    Glucose 01/31/2023 130 (H)  74 - 99 mg/dL Final    Sodium 01/31/2023 140  136 - 145 mmol/L Final    Potassium 01/31/2023 4.1  3.5 - 5.3 mmol/L Final    Chloride 01/31/2023 100  98 - 107 mmol/L Final    Bicarbonate 01/31/2023 30  21 - 32 mmol/L Final    Anion Gap 01/31/2023 14  10 - 20 mmol/L Final    Urea Nitrogen 01/31/2023 25 (H)  6 - 23 mg/dL Final    Creatinine 01/31/2023 0.68  0.50 - 1.05 mg/dL Final    GFR Female 01/31/2023 >90  >90 mL/min/1.73m2 Final    Comment:  CALCULATIONS OF ESTIMATED GFR ARE PERFORMED   USING THE 2021 CKD-EPI STUDY REFIT EQUATION   WITHOUT THE RACE VARIABLE FOR THE IDMS-TRACEABLE   CREATININE METHODS.    https://jasn.asnjournals.org/content/early/2021/09/22/ASN.0617179284      Calcium 01/31/2023 9.9  8.6 - 10.3 mg/dL Final    Albumin 01/31/2023 4.0  3.4 - 5.0  g/dL Final    Alkaline Phosphatase 01/31/2023 113  33 - 136 U/L Final    Total Protein 01/31/2023 6.6  6.4 - 8.2 g/dL Final    AST 01/31/2023 38  9 - 39 U/L Final    Total Bilirubin 01/31/2023 0.6  0.0 - 1.2 mg/dL Final    ALT (SGPT) 01/31/2023 39  7 - 45 U/L Final    Comment:  Patients treated with Sulfasalazine may generate    falsely decreased results for ALT.      TSH 01/31/2023 3.05  0.44 - 3.98 mIU/L Final    Comment:  TSH testing is performed using different testing    methodology at [facility] than at other    [facility]. Direct result comparisons should    only be made within the same method.      Adrenocorticotropic Hormone (ACTH) 01/31/2023 16.0  7.2 - 63.3 pg/mL Final    Comment: INTERPRETIVE INFORMATION: Adrenocorticotropic Hormone  Reference interval based on samples collected between 7 a.m. and   10 a.m.  No reference intervals established for p.m. collections.    Pediatric reference values are the same as adults (Acta Paediatr   Scand 1981;70:341-345).  This assay measures intact ACTH 1-39;   some types of synthetic ACTH and ACTH fragments are not detected   by this assay.  Performed By: [facility] [employer]  [address] [place] [id number]  : Mati Winchester MD, PhD     Legacy Encounter on 01/03/2023   Component Date Value Ref Range Status    WBC 01/03/2023 7.2  4.4 - 11.3 x10E9/L Final    RBC 01/03/2023 4.04  4.00 - 5.20 x10E12/L Final    Hemoglobin 01/03/2023 12.9  12.0 - 16.0 g/dL Final    Hematocrit 01/03/2023 38.8  36.0 - 46.0 % Final    MCV 01/03/2023 96  80 - 100 fL Final    MCHC 01/03/2023 33.2  32.0 - 36.0 g/dL Final    Platelets 01/03/2023 248  150 - 450 x10E9/L Final    RDW 01/03/2023 15.6 (H)  11.5 - 14.5 % Final    Neutrophils % 01/03/2023 60.1  40.0 - 80.0 % Final    Immature Granulocytes %, Automated 01/03/2023 0.1  0.0 - 0.9 % Final    Comment:  Immature Granulocyte Count (IG) includes promyelocytes,    myelocytes and metamyelocytes  but does not include bands.   Percent differential counts (%) should be interpreted in the   context of the absolute cell counts (cells/L).      Lymphocytes % 01/03/2023 24.0  13.0 - 44.0 % Final    Monocytes % 01/03/2023 10.4  2.0 - 10.0 % Final    Eosinophils % 01/03/2023 4.3  0.0 - 6.0 % Final    Basophils % 01/03/2023 1.1  0.0 - 2.0 % Final    Neutrophils Absolute 01/03/2023 4.35  1.60 - 5.50 x10E9/L Final    Lymphocytes Absolute 01/03/2023 1.74  0.80 - 3.00 x10E9/L Final    Monocytes Absolute 01/03/2023 0.75  0.05 - 0.80 x10E9/L Final    Eosinophils Absolute 01/03/2023 0.31  0.00 - 0.40 x10E9/L Final    Basophils Absolute 01/03/2023 0.08  0.00 - 0.10 x10E9/L Final    Glucose 01/03/2023 113 (H)  74 - 99 mg/dL Final    Sodium 01/03/2023 140  136 - 145 mmol/L Final    Potassium 01/03/2023 4.1  3.5 - 5.3 mmol/L Final    Chloride 01/03/2023 102  98 - 107 mmol/L Final    Bicarbonate 01/03/2023 31  21 - 32 mmol/L Final    Anion Gap 01/03/2023 11  10 - 20 mmol/L Final    Urea Nitrogen 01/03/2023 15  6 - 23 mg/dL Final    Creatinine 01/03/2023 0.63  0.50 - 1.05 mg/dL Final    GFR Female 01/03/2023 >90  >90 mL/min/1.73m2 Final    Comment:  CALCULATIONS OF ESTIMATED GFR ARE PERFORMED   USING THE 2021 CKD-EPI STUDY REFIT EQUATION   WITHOUT THE RACE VARIABLE FOR THE IDMS-TRACEABLE   CREATININE METHODS.    https://jasn.asnjournals.org/content/early/2021/09/22/ASN.2665985566      Calcium 01/03/2023 9.9  8.6 - 10.3 mg/dL Final    Albumin 01/03/2023 4.0  3.4 - 5.0 g/dL Final    Alkaline Phosphatase 01/03/2023 129  33 - 136 U/L Final    Total Protein 01/03/2023 6.7  6.4 - 8.2 g/dL Final    AST 01/03/2023 41 (H)  9 - 39 U/L Final    Total Bilirubin 01/03/2023 0.7  0.0 - 1.2 mg/dL Final    ALT (SGPT) 01/03/2023 38  7 - 45 U/L Final    Comment:  Patients treated with Sulfasalazine may generate    falsely decreased results for ALT.          Assessment/Plan     Problem List Items Addressed This Visit          Nervous    Sleep  difficulties       Circulatory    Athscl heart disease of native coronary artery w/o ang pctrs       Digestive    Cancer of abdominal esophagus (CMS/HCC)    Chronic pancreatitis (CMS/HCC)    Esophageal adenocarcinoma (CMS/HCC)    Relevant Medications    ferrous sulfate (iron) 325 (65 Fe) MG tablet    GERD (gastroesophageal reflux disease)    Relevant Medications    ferrous sulfate (iron) 325 (65 Fe) MG tablet       Endocrine/Metabolic    Papillary thyroid carcinoma (CMS/HCC)    Thyroid cancer (CMS/HCC)       Immune    Crohn's disease (CMS/HCC)    Relevant Medications    ferrous sulfate (iron) 325 (65 Fe) MG tablet       Other    Anxiety    Depression, major, single episode, mild (CMS/HCC)    Hyperglycemia    Relevant Orders    Comprehensive Metabolic Panel    Hemoglobin A1C    Lipid Panel    Rheumatoid arthritis (CMS/HCC)    Relevant Orders    XR shoulder right 2+ views    Encounter for Medicare annual wellness exam     Other Visit Diagnoses       Toe pain, chronic, left    -  Primary    Relevant Orders    Referral to Podiatry    XR foot left 3+ views    Encounter for screening mammogram for breast cancer        Relevant Orders    BI mammo bilateral screening tomosynthesis    Acute cough        Relevant Orders    Sars-CoV-2 and Influenza A/B PCR, Symptomatic    Dyslipidemia        Relevant Orders    Lipid Panel          Advance Care Planning Note   Discussion Date: 03/20/23   Discussion Participants: patient  Some anxiety    The patient wishes to discuss Advance Care Planning today and the following is a brief summary of our discussion.     Patient has capacity to make their own medical decisions: Yes  Health Care Agent/Surrogate Decision Maker documented in chart: Yes  Documents on file and valid:  Advance Directive/Living Will: Yes  FULL NO CODE NO DEPRESSION  Health Care Power of : Yes  Communication of Medical Status/Prognosis:   yes   Communication of Treatment Goals/Options:   yes  Treatment  Decisions  yes  Time Statement: Total face to face time spent on advance care planning was <30 minutes with <30 minutes spent in counseling, including the explanation.     Pt seen and examined with dr antony-confirmed findings  Check xr  For 2nd  3rd digit pain x15y to PODIATRY  Chief Complaint   Patient presents with    Medicare Annual Wellness Visit Subsequent    URI     Cough, nasal congestion x 1 week    Med Refill     Should she stay on Zoloft?      Sarah Chavez is here for a follow up controlled substance monitoring visit.  The Patient (or family if minor) has signed the controlled substance agreement and is current with urine tox or done today.  Mood is generally good and no major concerning side effects of medicine and being used as advised -no concerns or new side effects of this medicine/medicines.  Sarah Chavez has no suicidal or homicidal ideations and agrees to call-notify family if occurs-has crisis plan if occurs.    The patient denies history of   Seizures,   Heart attack/heart disease or issues  Stroke or neurologic issues of concern-including but not limited to migraine headaches/ paresthesias new tremor or complaints  No new gastrointestinal issues such as significant nausea/emeses, diarrhea, hematochezia or melena.  No dysuria, new incontinence, pelvic pressure, gross hematuria, nor new frequency complaints.  Mood is stable with no new issues with depression, anxiety or other psychiatric concerns.    Past Medical History:   Diagnosis Date    Other conditions influencing health status     Mammogram normal      Past Surgical History:   Procedure Laterality Date    APPENDECTOMY      CHOLECYSTECTOMY      COLONOSCOPY  09/2019    HYSTERECTOMY      OTHER SURGICAL HISTORY  06/19/2019    Debridement    OTHER SURGICAL HISTORY  06/19/2019    Splenectomy    OTHER SURGICAL HISTORY  11/10/2022    Esophagectomy    PANCREAS SURGERY      SHOULDER SURGERY        No components found for: 3MONTHS  "  Health Maintenance Due   Topic Date Due    DTaP/Tdap/Td Vaccines (1 - Tdap) Never done    Mammogram  Never done        OBJECTIVE:  Visit Vitals  /82 (BP Location: Right arm, Patient Position: Sitting, BP Cuff Size: Adult)   Pulse 68   Temp 36.4 °C (97.5 °F) (Temporal)   Resp 16   Ht 1.575 m (5' 2\") Comment: 5'2   Wt 53.1 kg (117 lb) Comment: 117 lb   LMP  (LMP Unknown)   SpO2 95%   BMI 21.40 kg/m²   OB Status Hysterectomy   Smoking Status Former   BSA 1.52 m²        Physical Exam:    General:  alert, oriented, no acute distress.  No obvious skin rashes noted.   No gait disturbance noted.    Mood is pleasant, not tearful, no signs of emotional distress.  Not appearing intoxicated or altered.   No voiced delusions,   Normal, appropriate behavior.    HEENT: Normocephalic, atraumatic,   Pupils round, reactive to light  Extraocular motions intact and wnl  Tympanic membranes normal    Neck: no nuchal rigidity  No masses palpable.  No carotid bruits.  No thyromegaly.    Respiratory: Equal breath sounds  No wheezes,    rales,    nor rhonchi  No respiratory distress.    Heart: Regular rate and rhythm, no    murmurs  no rubs/gallops    Abdomen: no masses palpable, no rebound nor guarding, no rebound nor guarding.    Extremities: NO cyanosis noted, no clubbing.   No edema noted.  2+dorsalis pedis pulses.    Normal-not antalgic, steady gait.    Fflex abd and int rotation WNL Ues some pain on r  (Minimal)    Assessment/Plan    Problem List Items Addressed This Visit          Nervous    Sleep difficulties       Circulatory    Athscl heart disease of native coronary artery w/o ang pctrs       Digestive    Cancer of abdominal esophagus (CMS/HCC)    Chronic pancreatitis (CMS/HCC)    Esophageal adenocarcinoma (CMS/HCC)    Relevant Medications    ferrous sulfate (iron) 325 (65 Fe) MG tablet    GERD (gastroesophageal reflux disease)    Relevant Medications    ferrous sulfate (iron) 325 (65 Fe) MG tablet       " Endocrine/Metabolic    Papillary thyroid carcinoma (CMS/HCC)    Thyroid cancer (CMS/HCC)       Immune    Crohn's disease (CMS/HCC)    Relevant Medications    ferrous sulfate (iron) 325 (65 Fe) MG tablet       Other    Anxiety    Depression, major, single episode, mild (CMS/HCC)    Hyperglycemia    Relevant Orders    Comprehensive Metabolic Panel    Hemoglobin A1C    Lipid Panel    Rheumatoid arthritis (CMS/HCC)    Relevant Orders    XR shoulder right 2+ views    Encounter for Medicare annual wellness exam     Other Visit Diagnoses       Toe pain, chronic, left    -  Primary    Relevant Orders    Referral to Podiatry    XR foot left 3+ views    Encounter for screening mammogram for breast cancer        Relevant Orders    BI mammo bilateral screening tomosynthesis    Acute cough        Relevant Orders    Sars-CoV-2 and Influenza A/B PCR, Symptomatic    Dyslipidemia        Relevant Orders    Lipid Panel          OARRS:    I have personally reviewed the OARRS report for Sarah Chavez. I have considered the risks of abuse, dependence, addiction and diversion    Last Urine Drug Screen / ordered /done see chart  Results are as expected.     Controlled Substance Agreement: DONE-see chart  Date of the Last Agreement: see chart  Reviewed Controlled Substance Agreement including but not limited to the benefits, risks, and alternatives to treatment with a Controlled Substance medication(s).      BENZOS:  Anxiety controlled  Activities of Daily Living:   Is your overall impression that this patient is benefiting (symptom reduction outweighs side effects) from stimulant therapy? Yes     1. Physical Functioning: Better  2. Family Relationship: Better  3. Social Relationship: Better  4. Mood: Better  5. Sleep Patterns: Better  6. Overall Function: Better      Overuse and abuse potential discussed with patient (parents if applicable)  If mood deterioration, status change etc on medicine, suicidal or homicidal ideations  -patient agrees to proceed with crisis plan-in place-including-not limited to contacting family, our office and or proceeding to ER.    It is recommended that OARRS reports be checked and patient have appointment at least every 3months(6 for certain medicines only)  If the agreement signed (controlled substance agreement) is violated prescriptions may be stopped abruptly and patient /family understands and agrees to this.  Another reason for termination of agreement is if we have concern for abuse or overuse and it is also recommended that patient take responsibility to try to taper and minimize use of these medicines frequently trying to limit or gradually taper to discontinuation.    Patient is aware that side effects such as insomnia, unexpected weight changes are unexpected and should result in discontinuation.  Always use caution and AVOID operating machinery(driving etc) on pain medicines or CNS depressants and avoid combining together OR with alcohol. If opioids are prescribed patient understands the benefits of narcan and was offered prescription.  Follow up sooner if condition deteriorates or problems arise.    Agrees to regular follow and counseling for maximum benefits.  Empiric amoxil if covid(-)   The risks, benefits, and alternatives for the antibiotic prescribed were discussed with patient as well as possible side effects. If ANY signs or symptoms of allergic reaction patient is to discontinue medicine immediately and call us or GO TO ER if tongue swelling/respiratory issues or significant effects.  It has already been determined that the benefits outweigh the risks of an antibiotic for you at this time, unless otherwise indicated.  Antibiotics are usually meant to be taken to completion as advised and it is usual course for symptoms to IMPROVE while taking-if symptoms worsen or new problems arise, we should be notified or medical evaluation should occur. Persistent side effects such as diarrhea  especially after antibiotic discontinuation are unusual, and should prompt assessment and evaluation either in office or ER depending on severity. Rash, and/or other symptoms of concern should also cause evaluation.  Complete resolution of original symptoms are expected and we should be informed if this does not occur.  Try claritin and flonase  See me 3-4mo  Sooner if sick  Covid test will return by tomorrow    Sima Miner MD

## 2023-03-21 LAB
FLU A RESULT: NOT DETECTED
FLU B RESULT: NOT DETECTED
SARS-COV-2 RESULT: NOT DETECTED

## 2023-03-27 ENCOUNTER — TELEPHONE (OUTPATIENT)
Dept: PRIMARY CARE | Facility: CLINIC | Age: 73
End: 2023-03-27
Payer: MEDICARE

## 2023-03-29 ENCOUNTER — OFFICE VISIT (OUTPATIENT)
Dept: PRIMARY CARE | Facility: CLINIC | Age: 73
End: 2023-03-29
Payer: MEDICARE

## 2023-03-29 VITALS
HEIGHT: 62 IN | SYSTOLIC BLOOD PRESSURE: 160 MMHG | TEMPERATURE: 97.4 F | HEART RATE: 69 BPM | OXYGEN SATURATION: 98 % | DIASTOLIC BLOOD PRESSURE: 93 MMHG | BODY MASS INDEX: 22.08 KG/M2 | RESPIRATION RATE: 16 BRPM | WEIGHT: 120 LBS

## 2023-03-29 DIAGNOSIS — J40 BRONCHITIS: Primary | ICD-10-CM

## 2023-03-29 DIAGNOSIS — R05.8 PRODUCTIVE COUGH: ICD-10-CM

## 2023-03-29 PROCEDURE — 1157F ADVNC CARE PLAN IN RCRD: CPT | Performed by: NURSE PRACTITIONER

## 2023-03-29 PROCEDURE — 99213 OFFICE O/P EST LOW 20 MIN: CPT | Performed by: NURSE PRACTITIONER

## 2023-03-29 PROCEDURE — 1160F RVW MEDS BY RX/DR IN RCRD: CPT | Performed by: NURSE PRACTITIONER

## 2023-03-29 PROCEDURE — 1036F TOBACCO NON-USER: CPT | Performed by: NURSE PRACTITIONER

## 2023-03-29 PROCEDURE — 1159F MED LIST DOCD IN RCRD: CPT | Performed by: NURSE PRACTITIONER

## 2023-03-29 RX ORDER — TRAMADOL HYDROCHLORIDE 50 MG/1
50 TABLET ORAL 4 TIMES DAILY PRN
COMMUNITY
Start: 2019-04-08

## 2023-03-29 RX ORDER — ACETAMINOPHEN 500 MG
5 TABLET ORAL NIGHTLY
COMMUNITY
End: 2023-06-28 | Stop reason: SDUPTHER

## 2023-03-29 RX ORDER — DOXYCYCLINE 100 MG/1
100 CAPSULE ORAL 2 TIMES DAILY
Qty: 14 CAPSULE | Refills: 0 | Status: SHIPPED | OUTPATIENT
Start: 2023-03-29 | End: 2023-04-05

## 2023-03-29 RX ORDER — ALPRAZOLAM 0.25 MG/1
0.25 TABLET ORAL NIGHTLY PRN
COMMUNITY
End: 2023-04-18 | Stop reason: SDUPTHER

## 2023-03-29 ASSESSMENT — ENCOUNTER SYMPTOMS
CARDIOVASCULAR NEGATIVE: 1
CHEST TIGHTNESS: 0
NEUROLOGICAL NEGATIVE: 1
SINUS PAIN: 1
COUGH: 1
CONSTITUTIONAL NEGATIVE: 1
HEMATOLOGIC/LYMPHATIC NEGATIVE: 1
NAUSEA: 1
WHEEZING: 0
EYES NEGATIVE: 1
SHORTNESS OF BREATH: 0
ABDOMINAL PAIN: 0
HEADACHES: 0
PSYCHIATRIC NEGATIVE: 1
ALLERGIC/IMMUNOLOGIC NEGATIVE: 1
ENDOCRINE NEGATIVE: 1
MUSCULOSKELETAL NEGATIVE: 1
DIARRHEA: 1

## 2023-03-29 NOTE — PROGRESS NOTES
"Subjective   Patient ID: Sarah Chavez is a 72 y.o. female who presents for URI.  Patient was seen in office 10 days ago tested for covid and flu.  Patient was placed on amoxicillin 875mg BID x 10 days.  Patient reports no improvement.  Patient has complaint of congestion, coughing shortness of breath and chest congestion.  Patient denies fever, sore throat, nor diarrhea.      URI   This is a new problem. The current episode started 1 to 4 weeks ago. The problem has been unchanged. There has been no fever. Associated symptoms include congestion, coughing, diarrhea, nausea and sinus pain. Pertinent negatives include no abdominal pain, chest pain, headaches, joint pain, joint swelling or wheezing.       Review of Systems   Constitutional: Negative.    HENT:  Positive for congestion and sinus pain.    Eyes: Negative.    Respiratory:  Positive for cough. Negative for chest tightness, shortness of breath and wheezing.    Cardiovascular: Negative.  Negative for chest pain.   Gastrointestinal:  Positive for diarrhea and nausea. Negative for abdominal pain.   Endocrine: Negative.    Genitourinary: Negative.    Musculoskeletal: Negative.  Negative for joint pain.   Skin: Negative.    Allergic/Immunologic: Negative.    Neurological: Negative.  Negative for headaches.   Hematological: Negative.    Psychiatric/Behavioral: Negative.     All other systems reviewed and are negative.      BP (!) 160/93   Pulse 69   Temp 36.3 °C (97.4 °F)   Resp 16   Ht 1.575 m (5' 2\")   Wt 54.4 kg (120 lb)   LMP  (LMP Unknown)   SpO2 98%   BMI 21.95 kg/m²     Objective   Physical Exam  Vitals and nursing note reviewed.   Constitutional:       Appearance: Normal appearance.   HENT:      Head: Normocephalic and atraumatic.      Right Ear: Tympanic membrane, ear canal and external ear normal.      Left Ear: Tympanic membrane, ear canal and external ear normal.      Nose: Nose normal.      Mouth/Throat:      Mouth: Mucous membranes are " moist.      Pharynx: Oropharynx is clear.   Eyes:      Extraocular Movements: Extraocular movements intact.      Conjunctiva/sclera: Conjunctivae normal.      Pupils: Pupils are equal, round, and reactive to light.   Cardiovascular:      Rate and Rhythm: Normal rate and regular rhythm.      Pulses: Normal pulses.      Heart sounds: Normal heart sounds.   Pulmonary:      Effort: Pulmonary effort is normal. No respiratory distress.      Breath sounds: Normal breath sounds. No stridor. No wheezing, rhonchi or rales.   Chest:      Chest wall: No tenderness.   Abdominal:      General: Bowel sounds are normal.      Palpations: Abdomen is soft.   Musculoskeletal:         General: Normal range of motion.      Cervical back: Normal range of motion and neck supple.   Skin:     General: Skin is warm and dry.      Capillary Refill: Capillary refill takes less than 2 seconds.   Neurological:      General: No focal deficit present.      Mental Status: She is alert and oriented to person, place, and time.   Psychiatric:         Mood and Affect: Mood normal.         Behavior: Behavior normal.         Thought Content: Thought content normal.         Judgment: Judgment normal.         Assessment/Plan   Problem List Items Addressed This Visit    None

## 2023-03-29 NOTE — PATIENT INSTRUCTIONS
Patient was seen and examined.  Diagnosis, treatment, and possible complications of today's illness discussed and explained to patient.  Patient to take medications associated with this visit.  Patient may also take OTC analgesic/antipyretic if needed for pain/fever.  Advised to increase oral fluid intake as tolerated if no nausea or vomiting.  Patient educated and advised to come back if worsening or persistent symptoms. Patient educated on when to seek urgent/emergent care. Patient was given opportunity to ask questions and denied any at this time.  Patient verbalized understanding and agrees with plan of care.

## 2023-03-29 NOTE — PROGRESS NOTES
Patient's PCP is Sima Miner MD      COLD SYMPTOMS  At home Covid-19 test:  NEGATIVE on Last week   Symptoms:  Runny nose, Congestion, Headache---coughing , Dry and Productive Cough---light yellow, SOB---without any activity,  Nausea, Diarrhea, Vomiting, Length of Symptoms: 10 days +   Denies: Fever,  Post Nasal Drainage, BILATERAL LEFT RIGHT Ear ache pressure or popping, Sore throat, Wheezing,Chills, Body aches, Sneezing, Fatigue,   Sinus pressure on forehead and under eyes,   Factors that effect symptoms: Amoxicillin 875 mg     --Related Information--  Seen by TERRY last week  Given Amoxicillin 875 mg. Last day of medication

## 2023-03-29 NOTE — ASSESSMENT & PLAN NOTE
Patient to take medications associated with this visit.  Patient may also take OTC analgesic/antipyretic if needed for pain/fever.  Advised to increase oral fluid intake as tolerated if no nausea or vomiting.  Patient educated and advised when to seek urgent/emergent care. If not improving patient will follow up for x-ray.

## 2023-04-18 ENCOUNTER — HOSPITAL ENCOUNTER (OUTPATIENT)
Dept: WOMENS IMAGING | Age: 73
Discharge: HOME | End: 2023-04-20
Payer: MEDICARE

## 2023-04-18 DIAGNOSIS — F41.9 ANXIETY: ICD-10-CM

## 2023-04-18 DIAGNOSIS — Z12.31 ENCOUNTER FOR SCREENING MAMMOGRAM FOR MALIGNANT NEOPLASM OF BREAST: ICD-10-CM

## 2023-04-18 PROCEDURE — 77063 BREAST TOMOSYNTHESIS BI: CPT

## 2023-04-18 NOTE — TELEPHONE ENCOUNTER
Please advise next step: ortho, PT, etc  Pt states in a lot of pain    STUDY:  Right shoulder, 3 views.     INDICATION:  R shoulder pain.     COMPARISON:  None.     ACCESSION NUMBER(S):  [id number]     ORDERING CLINICIAN:  GRETEL MARTINO     FINDINGS:  No acute fracture or malalignment.  Mild-to-moderate acromioclavicular joint degenerative changes with  osteophytes and subchondral cyst formation. Screw fixation of the  anterior glenoid noted with intact hardware.  Right chest wall MediPort device is visualized.  Soft tissues are unremarkable.     IMPRESSION:  1. 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 Otherwise  unremarkable right shoulder radiographs.     Result History

## 2023-04-19 RX ORDER — ALPRAZOLAM 0.25 MG/1
0.25 TABLET ORAL 3 TIMES DAILY PRN
Qty: 60 TABLET | Refills: 0 | Status: SHIPPED | OUTPATIENT
Start: 2023-04-19 | End: 2023-06-28 | Stop reason: SDUPTHER

## 2023-04-19 NOTE — TELEPHONE ENCOUNTER
Please advise next step: ortho, PT, etc  Pt states in a lot of pain     STUDY:  Right shoulder, 3 views.     INDICATION:  R shoulder pain.     COMPARISON:  None.     ACCESSION NUMBER(S):  [id number]     ORDERING CLINICIAN:  GRETEL MARTINO     FINDINGS:  No acute fracture or malalignment.  Mild-to-moderate acromioclavicular joint degenerative changes with  osteophytes and subchondral cyst formation. Screw fixation of the  anterior glenoid noted with intact hardware.  Right chest wall MediPort device is visualized.  Soft tissues are unremarkable.     IMPRESSION:  1. 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 Otherwise  unremarkable right shoulder radiographs.      Result History

## 2023-06-21 PROBLEM — H53.8 BLURRED VISION, RIGHT EYE: Status: RESOLVED | Noted: 2023-02-04 | Resolved: 2023-06-21

## 2023-06-21 PROBLEM — E05.90 THYROTOXICOSIS: Status: RESOLVED | Noted: 2023-02-04 | Resolved: 2023-06-21

## 2023-06-21 PROBLEM — N20.0 BILATERAL KIDNEY STONES: Status: RESOLVED | Noted: 2023-02-04 | Resolved: 2023-06-21

## 2023-06-21 PROBLEM — R10.9 ABDOMINAL WALL PAIN: Status: RESOLVED | Noted: 2023-02-04 | Resolved: 2023-06-21

## 2023-06-21 PROBLEM — J40 BRONCHITIS: Status: RESOLVED | Noted: 2023-03-29 | Resolved: 2023-06-21

## 2023-06-21 PROBLEM — R94.31 ABNORMAL EKG: Status: RESOLVED | Noted: 2023-02-04 | Resolved: 2023-06-21

## 2023-06-21 PROBLEM — R05.8 PRODUCTIVE COUGH: Status: RESOLVED | Noted: 2023-03-29 | Resolved: 2023-06-21

## 2023-06-28 ENCOUNTER — OFFICE VISIT (OUTPATIENT)
Dept: PRIMARY CARE | Facility: CLINIC | Age: 73
End: 2023-06-28
Payer: MEDICARE

## 2023-06-28 VITALS
HEIGHT: 63 IN | SYSTOLIC BLOOD PRESSURE: 134 MMHG | DIASTOLIC BLOOD PRESSURE: 78 MMHG | HEART RATE: 58 BPM | TEMPERATURE: 97.5 F | RESPIRATION RATE: 16 BRPM | WEIGHT: 114 LBS | BODY MASS INDEX: 20.2 KG/M2

## 2023-06-28 DIAGNOSIS — R79.89 ELEVATED PLATELET COUNT: ICD-10-CM

## 2023-06-28 DIAGNOSIS — F32.0 DEPRESSION, MAJOR, SINGLE EPISODE, MILD (CMS-HCC): ICD-10-CM

## 2023-06-28 DIAGNOSIS — F41.9 ANXIETY: ICD-10-CM

## 2023-06-28 DIAGNOSIS — C77.2 SECONDARY AND UNSPECIFIED MALIGNANT NEOPLASM OF INTRA-ABDOMINAL LYMPH NODES (MULTI): ICD-10-CM

## 2023-06-28 DIAGNOSIS — C15.5 CANCER OF ABDOMINAL ESOPHAGUS (MULTI): ICD-10-CM

## 2023-06-28 DIAGNOSIS — I25.10 ATHSCL HEART DISEASE OF NATIVE CORONARY ARTERY W/O ANG PCTRS: ICD-10-CM

## 2023-06-28 DIAGNOSIS — R73.9 HYPERGLYCEMIA: ICD-10-CM

## 2023-06-28 DIAGNOSIS — C73 PAPILLARY THYROID CARCINOMA (MULTI): ICD-10-CM

## 2023-06-28 DIAGNOSIS — Z93.1 GASTROSTOMY STATUS (MULTI): ICD-10-CM

## 2023-06-28 DIAGNOSIS — E46 PROTEIN-CALORIE MALNUTRITION, UNSPECIFIED SEVERITY (MULTI): ICD-10-CM

## 2023-06-28 DIAGNOSIS — C15.9 ESOPHAGEAL ADENOCARCINOMA (MULTI): ICD-10-CM

## 2023-06-28 DIAGNOSIS — K86.1 CHRONIC PANCREATITIS, UNSPECIFIED PANCREATITIS TYPE (MULTI): ICD-10-CM

## 2023-06-28 DIAGNOSIS — K21.9 GASTROESOPHAGEAL REFLUX DISEASE, UNSPECIFIED WHETHER ESOPHAGITIS PRESENT: ICD-10-CM

## 2023-06-28 DIAGNOSIS — K50.919 CROHN'S DISEASE WITH COMPLICATION, UNSPECIFIED GASTROINTESTINAL TRACT LOCATION (MULTI): ICD-10-CM

## 2023-06-28 DIAGNOSIS — M06.9 RHEUMATOID ARTHRITIS, INVOLVING UNSPECIFIED SITE, UNSPECIFIED WHETHER RHEUMATOID FACTOR PRESENT (MULTI): ICD-10-CM

## 2023-06-28 DIAGNOSIS — I26.99 PULMONARY EMBOLISM, UNSPECIFIED CHRONICITY, UNSPECIFIED PULMONARY EMBOLISM TYPE, UNSPECIFIED WHETHER ACUTE COR PULMONALE PRESENT (MULTI): ICD-10-CM

## 2023-06-28 DIAGNOSIS — E78.5 DYSLIPIDEMIA: ICD-10-CM

## 2023-06-28 DIAGNOSIS — Z79.899 MEDICATION MANAGEMENT: ICD-10-CM

## 2023-06-28 DIAGNOSIS — I51.7 CARDIOMEGALY: Primary | ICD-10-CM

## 2023-06-28 DIAGNOSIS — G47.9 SLEEP DIFFICULTIES: ICD-10-CM

## 2023-06-28 PROCEDURE — 1159F MED LIST DOCD IN RCRD: CPT | Performed by: FAMILY MEDICINE

## 2023-06-28 PROCEDURE — 1160F RVW MEDS BY RX/DR IN RCRD: CPT | Performed by: FAMILY MEDICINE

## 2023-06-28 PROCEDURE — 1036F TOBACCO NON-USER: CPT | Performed by: FAMILY MEDICINE

## 2023-06-28 PROCEDURE — 1157F ADVNC CARE PLAN IN RCRD: CPT | Performed by: FAMILY MEDICINE

## 2023-06-28 PROCEDURE — 80307 DRUG TEST PRSMV CHEM ANLYZR: CPT

## 2023-06-28 PROCEDURE — 99214 OFFICE O/P EST MOD 30 MIN: CPT | Performed by: FAMILY MEDICINE

## 2023-06-28 RX ORDER — OMEPRAZOLE 40 MG/1
1 CAPSULE, DELAYED RELEASE ORAL DAILY
COMMUNITY
Start: 2023-05-18

## 2023-06-28 RX ORDER — ALPRAZOLAM 0.25 MG/1
0.25 TABLET ORAL NIGHTLY PRN
Qty: 60 TABLET | Refills: 1 | Status: SHIPPED | OUTPATIENT
Start: 2023-06-28 | End: 2024-01-16 | Stop reason: SDUPTHER

## 2023-06-28 NOTE — PATIENT INSTRUCTIONS

## 2023-06-28 NOTE — PROGRESS NOTES
Subjective   Patient ID: Sarah Chavez is a 72 y.o. female who presents for Med Management.    HPI  Patient Active Problem List   Diagnosis    Actinic keratosis    Anxiety    Athscl heart disease of native coronary artery w/o ang pctrs    Cancer of abdominal esophagus (CMS/HCC)    Candidal intertrigo    Cardiomegaly    Cervical paraspinal muscle spasm    Chronic pancreatitis (CMS/HCC)    Depression, major, single episode, mild (CMS/HCC)    Dysuria    Elevated platelet count    Esophageal adenocarcinoma (CMS/HCC)    GERD (gastroesophageal reflux disease)    Globus sensation    Hematuria    Hyperglycemia    Intraepithelial neoplasm of pancreas    Mild aortic insufficiency    Mild diastolic dysfunction    Nausea and/or vomiting    Odynophagia    Papillary thyroid carcinoma (CMS/HCC)    Pleural effusion    Post-splenectomy    Pulmonary embolism (CMS/HCC)    Rheumatoid arthritis (CMS/HCC)    Seasonal allergies    Sleep difficulties    Goiter    Thyroid nodule    Trigger point of neck    Urinary tract infection    Uses feeding tube    Overweight with body mass index (BMI) of 25 to 25.9 in adult    Crohn's disease (CMS/HCC)    Encounter for Medicare annual wellness exam    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)    Gastrostomy status (CMS/HCC)    Protein-calorie malnutrition, unspecified severity (CMS/HCC)       Past Surgical History:   Procedure Laterality Date    APPENDECTOMY  1981    CHOLECYSTECTOMY  1994    COLONOSCOPY  09/2019    early adenomatous glandular changes-due 2024    ESOPHAGUS SURGERY      HYSTERECTOMY  1987    [facility]    PANCREAS SURGERY  2019    30% of pancreas removed    SHOULDER SURGERY Right 1977    1 pin    SPLENECTOMY, TOTAL         Review of Systems  This patient has   NO history of recent Covid nor flu symptoms,  NO Fever nor chills,  NO Chest pain, shortness of breath nor paroxysmal nocturnal dyspnea,  NO Nausea, vomiting, nor diarrhea,  NO  "Hematochezia nor melena,  NO Dysuria, hematuria, nor new incontinence issues  NO new severe headaches nor neurological complaints,  NO new issues with anxiety nor depression nor new psychiatric complaints,  NO suicidal nor homicidal ideations.     OBJECTIVE:  /78   Pulse 58   Temp 36.4 °C (97.5 °F) (Temporal)   Resp 16   Ht 1.588 m (5' 2.5\")   Wt 51.7 kg (114 lb)   LMP 01/01/1987 (Approximate)   BMI 20.52 kg/m²      General:  alert, oriented, no acute distress.  No obvious skin rashes noted.   No gait disturbance noted.    Mood is pleasant, not tearful, no signs of emotional distress.  Not appearing intoxicated or altered.   No voiced delusions,   Normal, appropriate behavior.    HEENT: Normocephalic, atraumatic,   Pupils round, reactive to light  Extraocular motions intact and wnl  Tympanic membranes normal    Neck: no nuchal rigidity  No masses palpable.  No carotid bruits.  No thyromegaly.    Respiratory: Equal breath sounds  No wheezes,    rales,    nor rhonchi  No respiratory distress.    Heart: Regular rate and rhythm, no    murmurs  no rubs/gallops    Abdomen: no masses palpable, nontender, no rebound nor guarding.    Extremities: NO cyanosis noted, no clubbing.   No edema noted.  2+dorsalis pedis pulses.    Normal-not antalgic, steady gait.    Legacy Encounter on 06/06/2023   Component Date Value Ref Range Status    WBC 06/06/2023 5.9  4.4 - 11.3 x10E9/L Final    RBC 06/06/2023 3.76 (L)  4.00 - 5.20 x10E12/L Final    Hemoglobin 06/06/2023 12.7  12.0 - 16.0 g/dL Final    Hematocrit 06/06/2023 37.6  36.0 - 46.0 % Final    MCV 06/06/2023 100  80 - 100 fL Final    MCHC 06/06/2023 33.8  32.0 - 36.0 g/dL Final    Platelets 06/06/2023 236  150 - 450 x10E9/L Final    RDW 06/06/2023 14.2  11.5 - 14.5 % Final    Neutrophils % 06/06/2023 44.2  40.0 - 80.0 % Final    Immature Granulocytes %, Automated 06/06/2023 0.2  0.0 - 0.9 % Final    Comment:  Immature Granulocyte Count (IG) includes promyelocytes,    " myelocytes and metamyelocytes but does not include bands.   Percent differential counts (%) should be interpreted in the   context of the absolute cell counts (cells/L).      Lymphocytes % 06/06/2023 37.4  13.0 - 44.0 % Final    Monocytes % 06/06/2023 13.5  2.0 - 10.0 % Final    Eosinophils % 06/06/2023 4.2  0.0 - 6.0 % Final    Basophils % 06/06/2023 0.5  0.0 - 2.0 % Final    Neutrophils Absolute 06/06/2023 2.63  1.60 - 5.50 x10E9/L Final    Lymphocytes Absolute 06/06/2023 2.22  0.80 - 3.00 x10E9/L Final    Monocytes Absolute 06/06/2023 0.80  0.05 - 0.80 x10E9/L Final    Eosinophils Absolute 06/06/2023 0.25  0.00 - 0.40 x10E9/L Final    Basophils Absolute 06/06/2023 0.03  0.00 - 0.10 x10E9/L Final    Glucose 06/06/2023 110 (H)  74 - 99 mg/dL Final    Sodium 06/06/2023 144  136 - 145 mmol/L Final    Potassium 06/06/2023 4.1  3.5 - 5.3 mmol/L Final    Chloride 06/06/2023 106  98 - 107 mmol/L Final    Bicarbonate 06/06/2023 31  21 - 32 mmol/L Final    Anion Gap 06/06/2023 11  10 - 20 mmol/L Final    Urea Nitrogen 06/06/2023 14  6 - 23 mg/dL Final    Creatinine 06/06/2023 0.67  0.50 - 1.05 mg/dL Final    GFR Female 06/06/2023 >90  >90 mL/min/1.73m2 Final    Comment:  CALCULATIONS OF ESTIMATED GFR ARE PERFORMED   USING THE 2021 CKD-EPI STUDY REFIT EQUATION   WITHOUT THE RACE VARIABLE FOR THE IDMS-TRACEABLE   CREATININE METHODS.    https://jasn.asnjournals.org/content/early/2021/09/22/ASN.1278236137      Calcium 06/06/2023 9.3  8.6 - 10.3 mg/dL Final    Albumin 06/06/2023 3.9  3.4 - 5.0 g/dL Final    Alkaline Phosphatase 06/06/2023 96  33 - 136 U/L Final    Total Protein 06/06/2023 6.3 (L)  6.4 - 8.2 g/dL Final    AST 06/06/2023 36  9 - 39 U/L Final    Total Bilirubin 06/06/2023 0.7  0.0 - 1.2 mg/dL Final    ALT (SGPT) 06/06/2023 40  7 - 45 U/L Final    Comment:  Patients treated with Sulfasalazine may generate    falsely decreased results for ALT.     Legacy Encounter on 05/10/2023   Component Date Value Ref Range  Status    Adrenocorticotropic Hormone (ACTH) 05/10/2023 34.0  7.2 - 63.3 pg/mL Final    Comment: INTERPRETIVE INFORMATION: Adrenocorticotropic Hormone  Reference interval based on samples collected between 7 a.m. and   10 a.m.  No reference intervals established for p.m. collections.    Pediatric reference values are the same as adults (Acta Paediatr   Scand 1981;70:341-345).  This assay measures intact ACTH 1-39;   some types of synthetic ACTH and ACTH fragments are not detected   by this assay.  Performed By: [employer]  [address] [place] [id number]  : Mati Winchester MD, PhD     Legacy Encounter on 05/09/2023   Component Date Value Ref Range Status    Cortisol 05/09/2023 13.6  2.5 - 20.0 ug/dL Final    TSH 05/09/2023 2.17  0.44 - 3.98 mIU/L Final    Comment:  TSH testing is performed using different testing    methodology at [facility] than at other    [facility]. Direct result comparisons should    only be made within the same method.      Adrenocorticotropic Hormone (ACTH) 05/09/2023 36.5  7.2 - 63.3 pg/mL Final    Comment: INTERPRETIVE INFORMATION: Adrenocorticotropic Hormone  Reference interval based on samples collected between 7 a.m. and   10 a.m.  No reference intervals established for p.m. collections.    Pediatric reference values are the same as adults (Acta Paediatr   Scand 1981;70:341-345).  This assay measures intact ACTH 1-39;   some types of synthetic ACTH and ACTH fragments are not detected   by this assay.  Performed By: [employer]  [address] [place] [id number]  : Mati Winchester MD, PhD      WBC 05/09/2023 5.4  4.4 - 11.3 x10E9/L Final    RBC 05/09/2023 3.85 (L)  4.00 - 5.20 x10E12/L Final    Hemoglobin 05/09/2023 12.6  12.0 - 16.0 g/dL Final    Hematocrit 05/09/2023 38.8  36.0 - 46.0 % Final    MCV 05/09/2023 101 (H)  80 - 100 fL Final    MCHC 05/09/2023 32.5  32.0 - 36.0 g/dL Final     Platelets 05/09/2023 209  150 - 450 x10E9/L Final    RDW 05/09/2023 14.1  11.5 - 14.5 % Final    Neutrophils % 05/09/2023 47.3  40.0 - 80.0 % Final    Immature Granulocytes %, Automated 05/09/2023 0.0  0.0 - 0.9 % Final    Comment:  Immature Granulocyte Count (IG) includes promyelocytes,    myelocytes and metamyelocytes but does not include bands.   Percent differential counts (%) should be interpreted in the   context of the absolute cell counts (cells/L).      Lymphocytes % 05/09/2023 34.6  13.0 - 44.0 % Final    Monocytes % 05/09/2023 11.8  2.0 - 10.0 % Final    Eosinophils % 05/09/2023 5.4  0.0 - 6.0 % Final    Basophils % 05/09/2023 0.9  0.0 - 2.0 % Final    Neutrophils Absolute 05/09/2023 2.56  1.60 - 5.50 x10E9/L Final    Lymphocytes Absolute 05/09/2023 1.87  0.80 - 3.00 x10E9/L Final    Monocytes Absolute 05/09/2023 0.64  0.05 - 0.80 x10E9/L Final    Eosinophils Absolute 05/09/2023 0.29  0.00 - 0.40 x10E9/L Final    Basophils Absolute 05/09/2023 0.05  0.00 - 0.10 x10E9/L Final    Glucose 05/09/2023 110 (H)  74 - 99 mg/dL Final    Sodium 05/09/2023 140  136 - 145 mmol/L Final    Potassium 05/09/2023 3.8  3.5 - 5.3 mmol/L Final    Chloride 05/09/2023 104  98 - 107 mmol/L Final    Bicarbonate 05/09/2023 30  21 - 32 mmol/L Final    Anion Gap 05/09/2023 10  10 - 20 mmol/L Final    Urea Nitrogen 05/09/2023 12  6 - 23 mg/dL Final    Creatinine 05/09/2023 0.53  0.50 - 1.05 mg/dL Final    GFR Female 05/09/2023 >90  >90 mL/min/1.73m2 Final    Comment:  CALCULATIONS OF ESTIMATED GFR ARE PERFORMED   USING THE 2021 CKD-EPI STUDY REFIT EQUATION   WITHOUT THE RACE VARIABLE FOR THE IDMS-TRACEABLE   CREATININE METHODS.    https://jasn.asnjournals.org/content/early/2021/09/22/ASN.1625640037      Calcium 05/09/2023 9.2  8.6 - 10.3 mg/dL Final    Albumin 05/09/2023 3.7  3.4 - 5.0 g/dL Final    Alkaline Phosphatase 05/09/2023 77  33 - 136 U/L Final    Total Protein 05/09/2023 6.1 (L)  6.4 - 8.2 g/dL Final    AST 05/09/2023 30  9  - 39 U/L Final    Total Bilirubin 05/09/2023 0.8  0.0 - 1.2 mg/dL Final    ALT (SGPT) 05/09/2023 30  7 - 45 U/L Final    Comment:  Patients treated with Sulfasalazine may generate    falsely decreased results for ALT.     Legacy Encounter on 04/11/2023   Component Date Value Ref Range Status    Glucose 04/11/2023 110 (H)  74 - 99 mg/dL Final    Sodium 04/11/2023 142  136 - 145 mmol/L Final    Potassium 04/11/2023 3.7  3.5 - 5.3 mmol/L Final    Chloride 04/11/2023 103  98 - 107 mmol/L Final    Bicarbonate 04/11/2023 30  21 - 32 mmol/L Final    Anion Gap 04/11/2023 13  10 - 20 mmol/L Final    Urea Nitrogen 04/11/2023 8  6 - 23 mg/dL Final    Creatinine 04/11/2023 0.65  0.50 - 1.05 mg/dL Final    GFR Female 04/11/2023 >90  >90 mL/min/1.73m2 Final    Comment:  CALCULATIONS OF ESTIMATED GFR ARE PERFORMED   USING THE 2021 CKD-EPI STUDY REFIT EQUATION   WITHOUT THE RACE VARIABLE FOR THE IDMS-TRACEABLE   CREATININE METHODS.    https://jasn.asnjournals.org/content/early/2021/09/22/ASN.3170358823      Calcium 04/11/2023 9.4  8.6 - 10.3 mg/dL Final    Albumin 04/11/2023 3.8  3.4 - 5.0 g/dL Final    Alkaline Phosphatase 04/11/2023 85  33 - 136 U/L Final    Total Protein 04/11/2023 6.3 (L)  6.4 - 8.2 g/dL Final    AST 04/11/2023 31  9 - 39 U/L Final    Total Bilirubin 04/11/2023 0.7  0.0 - 1.2 mg/dL Final    ALT (SGPT) 04/11/2023 27  7 - 45 U/L Final    Comment:  Patients treated with Sulfasalazine may generate    falsely decreased results for ALT.      WBC 04/11/2023 5.8  4.4 - 11.3 x10E9/L Final    RBC 04/11/2023 3.93 (L)  4.00 - 5.20 x10E12/L Final    Hemoglobin 04/11/2023 12.9  12.0 - 16.0 g/dL Final    Hematocrit 04/11/2023 39.2  36.0 - 46.0 % Final    MCV 04/11/2023 100  80 - 100 fL Final    MCHC 04/11/2023 32.9  32.0 - 36.0 g/dL Final    Platelets 04/11/2023 205  150 - 450 x10E9/L Final    RDW 04/11/2023 15.3 (H)  11.5 - 14.5 % Final    Neutrophils % 04/11/2023 50.4  40.0 - 80.0 % Final    Immature Granulocytes %,  Automated 04/11/2023 0.0  0.0 - 0.9 % Final    Comment:  Immature Granulocyte Count (IG) includes promyelocytes,    myelocytes and metamyelocytes but does not include bands.   Percent differential counts (%) should be interpreted in the   context of the absolute cell counts (cells/L).      Lymphocytes % 04/11/2023 34.3  13.0 - 44.0 % Final    Monocytes % 04/11/2023 11.7  2.0 - 10.0 % Final    Eosinophils % 04/11/2023 2.9  0.0 - 6.0 % Final    Basophils % 04/11/2023 0.7  0.0 - 2.0 % Final    Neutrophils Absolute 04/11/2023 2.92  1.60 - 5.50 x10E9/L Final    Lymphocytes Absolute 04/11/2023 1.99  0.80 - 3.00 x10E9/L Final    Monocytes Absolute 04/11/2023 0.68  0.05 - 0.80 x10E9/L Final    Eosinophils Absolute 04/11/2023 0.17  0.00 - 0.40 x10E9/L Final    Basophils Absolute 04/11/2023 0.04  0.00 - 0.10 x10E9/L Final        Assessment/Plan     Problem List Items Addressed This Visit       Anxiety    Relevant Medications    ALPRAZolam (Xanax) 0.25 mg tablet    Other Relevant Orders    Drug Screen, Urine With Reflex to Confirmation    Athscl heart disease of native coronary artery w/o ang pctrs    Cancer of abdominal esophagus (CMS/HCC)    Cardiomegaly    Chronic pancreatitis (CMS/HCC)    Depression, major, single episode, mild (CMS/HCC)    Elevated platelet count    Esophageal adenocarcinoma (CMS/HCC)    GERD (gastroesophageal reflux disease)    Hyperglycemia    Relevant Orders    Hemoglobin A1C    Comprehensive Metabolic Panel    Papillary thyroid carcinoma (CMS/HCC)    Pulmonary embolism (CMS/HCC)    Rheumatoid arthritis (CMS/HCC)    Sleep difficulties    Crohn's disease (CMS/HCC)    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 If no results are received on my chart or by letter within 1 - 3 weeks, the patient is aware they need to call to obtain results, as this is not usual. Also, if any new conditions arise, or current condition worsens, it is understood that sooner appointment should be made or urgent care/convenient care or emergency room treatment should be sought depending on severity. Otherwise follow up for recheck at regular intervals as we have discussed, at least yearly.    Overuse and abuse potential discussed with patient (parents if applicable)  If mood deterioration, status change etc on medicine, suicidal or homicidal ideations -patient agrees to proceed with crisis plan-in place-including-not limited to contacting family, our office and or proceeding to ER.    It is recommended that [place]AR[place] reports be checked and patient have appointment at least every 3months(6 for certain medicines only)  If the agreement signed (controlled substance agreement) is violated prescriptions may be stopped abruptly and patient /family understands and agrees to this.  Another reason for termination of agreement is if we have concern for abuse or overuse and it is also recommended that patient take responsibility to try to taper and minimize use of these medicines frequently trying to limit or gradually taper to discontinuation.    Patient is aware that side effects such as insomnia, unexpected weight changes are unexpected and should result in discontinuation.  Always use caution and AVOID operating machinery(driving etc) on pain medicines or CNS depressants and avoid combining together OR with alcohol. If opioids are prescribed patient understands the benefits of narcan and was offered prescription.  Follow up sooner if condition deteriorates or problems arise.    Agrees to regular follow and counseling for maximum benefits.  Rare use xanax risks  addressed      Patient was identified as a fall risk. Risk prevention instructions provided.

## 2023-06-29 LAB
AMPHETAMINE (PRESENCE) IN URINE BY SCREEN METHOD: NORMAL
BARBITURATES PRESENCE IN URINE BY SCREEN METHOD: NORMAL
BENZODIAZEPINE (PRESENCE) IN URINE BY SCREEN METHOD: NORMAL
CANNABINOIDS IN URINE BY SCREEN METHOD: NORMAL
COCAINE (PRESENCE) IN URINE BY SCREEN METHOD: NORMAL
DRUG SCREEN COMMENT URINE: NORMAL
FENTANYL URINE: NORMAL
METHADONE (PRESENCE) IN URINE BY SCREEN METHOD: NORMAL
OPIATES (PRESENCE) IN URINE BY SCREEN METHOD: NORMAL
OXYCODONE (PRESENCE) IN URINE BY SCREEN METHOD: NORMAL
PHENCYCLIDINE (PRESENCE) IN URINE BY SCREEN METHOD: NORMAL

## 2023-09-18 DIAGNOSIS — I25.10 ATHSCL HEART DISEASE OF NATIVE CORONARY ARTERY W/O ANG PCTRS: ICD-10-CM

## 2023-09-19 RX ORDER — ATORVASTATIN CALCIUM 20 MG/1
20 TABLET, FILM COATED ORAL NIGHTLY
Qty: 90 TABLET | Refills: 3 | Status: SHIPPED | OUTPATIENT
Start: 2023-09-19 | End: 2023-12-19 | Stop reason: SINTOL

## 2023-10-10 ENCOUNTER — OFFICE VISIT (OUTPATIENT)
Dept: PRIMARY CARE | Facility: CLINIC | Age: 73
End: 2023-10-10
Payer: MEDICARE

## 2023-10-10 ENCOUNTER — ANCILLARY PROCEDURE (OUTPATIENT)
Dept: RADIOLOGY | Facility: CLINIC | Age: 73
End: 2023-10-10
Payer: MEDICARE

## 2023-10-10 VITALS
RESPIRATION RATE: 18 BRPM | TEMPERATURE: 97.5 F | BODY MASS INDEX: 20.2 KG/M2 | OXYGEN SATURATION: 95 % | HEIGHT: 63 IN | WEIGHT: 114 LBS | DIASTOLIC BLOOD PRESSURE: 88 MMHG | SYSTOLIC BLOOD PRESSURE: 130 MMHG

## 2023-10-10 DIAGNOSIS — R25.2 LEG CRAMPS: Primary | ICD-10-CM

## 2023-10-10 DIAGNOSIS — G89.29 CHRONIC PAIN OF LEFT KNEE: ICD-10-CM

## 2023-10-10 DIAGNOSIS — M25.562 CHRONIC PAIN OF LEFT KNEE: ICD-10-CM

## 2023-10-10 PROCEDURE — 1036F TOBACCO NON-USER: CPT | Performed by: PHYSICIAN ASSISTANT

## 2023-10-10 PROCEDURE — 99213 OFFICE O/P EST LOW 20 MIN: CPT | Performed by: PHYSICIAN ASSISTANT

## 2023-10-10 PROCEDURE — 73562 X-RAY EXAM OF KNEE 3: CPT | Mod: LEFT SIDE | Performed by: RADIOLOGY

## 2023-10-10 PROCEDURE — 1125F AMNT PAIN NOTED PAIN PRSNT: CPT | Performed by: PHYSICIAN ASSISTANT

## 2023-10-10 PROCEDURE — 73562 X-RAY EXAM OF KNEE 3: CPT | Mod: LT,FY

## 2023-10-10 PROCEDURE — 1159F MED LIST DOCD IN RCRD: CPT | Performed by: PHYSICIAN ASSISTANT

## 2023-10-10 PROCEDURE — 1160F RVW MEDS BY RX/DR IN RCRD: CPT | Performed by: PHYSICIAN ASSISTANT

## 2023-10-10 ASSESSMENT — ENCOUNTER SYMPTOMS
MYALGIAS: 1
ARTHRALGIAS: 1

## 2023-10-10 NOTE — PROGRESS NOTES
"Subjective   Patient ID: Sarah Chavez is a 72 y.o. female who presents for Leg Cramps (Dr Miner pt here today for bilateral leg cramping but worse in right and hard to stand when she gets them and has to take a heating pad to work it out. ), Knee Pain (Pt having left knee pain for months but getting worse past month; if she wants to turn her leg while sleeping she has to literally pull her leg up and over to turn. ), and scab (Pt had a scab on left hand and pt bit it off and bleed a lot an wants it looked at to make sure its ok. ).    HPI     Leg cramps:   - was getting really bad in the past and then started taking magnesium and potassium supplements and they improve; the past few months sxs starting again despite the supplements  - Having the cramps at least twice a week but feet cramping up every night now   - No limping or trouble walking throughout the day   - ?lipitor    Left knee pain:   - Onset: 3 months off and on but gradually worsening   - Description: hurts when in bed or when has been still for a while and goes to get up   - During the day is usually fine, once in a while will shot and will give out feeling   - Txs tried: Aleve every couple days   - Previous injury/ surgery: no     Review of Systems   Musculoskeletal:  Positive for arthralgias (left knee) and myalgias (right calf).       Objective   /88   Temp 36.4 °C (97.5 °F)   Resp 18   Ht 1.588 m (5' 2.5\")   Wt 51.7 kg (114 lb)   LMP 01/01/1987 (Approximate)   SpO2 95%   BMI 20.52 kg/m²     Physical Exam  Musculoskeletal:      Right knee: No swelling, deformity or effusion. Normal range of motion. No tenderness. No LCL laxity, MCL laxity, ACL laxity or PCL laxity. Normal alignment, normal meniscus and normal patellar mobility.      Left knee: No swelling, deformity or effusion. Normal range of motion. No tenderness. No LCL laxity, MCL laxity, ACL laxity or PCL laxity.Normal alignment, normal meniscus and normal patellar " mobility.      Right lower leg: No swelling, deformity, lacerations or tenderness. No edema.      Left lower leg: No swelling, deformity, lacerations or tenderness. No edema.       Assessment/Plan   Problem List Items Addressed This Visit    None  Visit Diagnoses         Codes    Leg cramps    -  Primary R25.2    Chronic pain of left knee     M25.562, G89.29    Relevant Orders    XR knee left 3 views (Completed)          For the leg pain - may be due to Lipitor - hold lipitor x 1 week and see how legs feel   For the left knee pain - concerning for arthritis - will check xray

## 2023-10-11 DIAGNOSIS — M17.12 ARTHRITIS OF KNEE, LEFT: Primary | ICD-10-CM

## 2023-11-05 NOTE — PROGRESS NOTES
Patient ID: Sarah Chavez is a 73 y.o. female.    Subjective    HPI  Ms. 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 Currently on adjuvant nivolumab. Here for consideration of cycle 9 day 1.    Since last visit, patient reports that she had a new rash on her toes that resolved with antibiotics, prednisone and soaks. Denies any other rashes. She also reports that she had swollen ankles and had an ultrasound of her legs done which was normal. No new GI issues. No other swelling. No other complaints today. Patient will be traveling to [place] for the month of February 2024.     Patient's past medical history, surgical history, family history and social history reviewed.     Objective    Vitals:    11/08/23 0934   BP: 135/84   Pulse: 61   Resp: 16   Temp: 36 °C (96.8 °F)   SpO2: 97%      LMP 01/01/1987 (Approximate)      Review of Systems:   Review of Systems:    Positive per HPI, otherwise negative.     Physical Exam:   Constitutional: Patient appears in no acute distress.   Sitting comfortably in chair.  Eyes: EOMI, clear sclera  ENMT: mucous membranes moist, no apparent injury  Head/Neck: Neck supple, no JVD  Respiratory/Thorax: Patent airways, CTAB, normal  breath sounds, no increased work of breathing  Cardiovascular: Regular, rate and rhythm, no murmurs  Gastrointestinal: Nondistended, soft, non-tender,  no rebound tenderness or guarding, no masses palpable  Extremities: normal extremities, no cyanosis edema,  no swelling  Neurological: alert and oriented x3, nonfocal, normal  speech and hearing  Lymphatic: No palpable lymphadenopathy in cervical,  axillary  lymph nodes.  Spleen appears normal size.  Psychological: Appropriate mood and behavior, normal  affect  Skin: Warm and dry, no lesions, no rashes     Performance Status:  Symptomatic; fully ambulatory    Labs/Imaging/Pathology: personally reviewed reports and images in Epic  electronic medical record system. Pertinent results as it related to the plan represented in below in assessment and plan.     Assessment/Plan   1. GE junction adenocarcinoma, ypT2N1  - Initially presented to GI  1/10/2022 with epigastric pain and dysphasia, weight loss. Patient  underwent an EGD on 1/21/2022 with Dr. Lora and was found to have a malignant esophageal tumor at the GE junction . An upper EUS with Dr. Delgadillo  on 2/3/2022 found to have a malignant tumor in the lower esophagus, at the GE  junction and extending into the cardia. Pathology returned as adenocarcinoma stage T3N0 by endosonographic criteria.   - 3/2/2022 - Started FOLFOX, completed 3 cycles, C2 onward 80% paclitaxel due to neuropathy.  - 4/8/2022 - PET/CT per [facility] 40531 with great response.   - 4/18/2022 - Started on RT with FOLFOX on 4/19/2022.  - C5 skipped due to thrombocytopenia.  - 5/11/2022 - Last cycle of FOLFOX.  - 6/27/2022 - Post CRT PET with good response.  - 7/20/2022 Status-post esophagectomy, final pathology revealed an 0.5 cm residual foci of poorly differentiated adenocarcinoma along with 1/15 lymph nodes with metastatic  carcinoma. Final stage ypT2 N1.  - Discussed the role of adjuvant nivolumab per Checkmate 577 which showed an increase in disease-free survival when nivolumab was given every 2 weeks for 8 treatments followed by every 4 weeks to complete the year.   - Reviewed nivolumab and side effects. Patient was agreeable to treatment and consent signed.   - 8/2/23 completed one year of adjuvant nivolumab     8/2/23:  - No contraindications to proceed with last treatment of nivolumab today. She has completed 1 year of treatment.   - We discussed that she already has her next scans scheduled through Surgery.  - We discussed that she would like to keep her port in due to having poor IV access.  - We will plan for 6-week port flush and follow-up with me in 12 weeks.   - She will call us with any worsening symptoms.   - Her  major compliant continues to be poor appetite and trouble eating. We will have repeat labs with immunotherapy in 6 weeks and I will see her again in 12 weeks.   - RTC with me in 12 weeks.     11/8/23:  - We will plan for a port flush today and blood work.   - She is planned for an ultrasound and follow-up with Dr. Poon tomorrow.  - She did have a new rash on her toes concerning for bullous pemphigoid versus a hypersensitivity that resolved with steroids. We discussed if those symptoms come back to call us and we can get her in with Dermatology   as she may need steroids for a longer period of time.  - We will plan to check an ACTH and TSH with labs today. If unremarkable, we will plan for follow-up in 3 months with a CT chest prior along with CBC, CMP, ACTH and TSH.   - We will plan for another Signatera in 3 months.   - We will plan for a port flush in 8 weeks.  - Due to traveling her follow up and port flushes will be stretched out to allow for travel to [place] in February 2024.   - RTC with me in March 2024.      RTC with me in March 2024. This note has been transcribed using a medical scribe and there is a possibility of unintentional typing misprints.      Diagnoses and all orders for this visit:  Adenocarcinoma of esophagus (CMS/HCC)  -     CBC and Auto Differential; Future  -     Adrenocorticotropic Hormone (ACTH); Future  -     Cortisol; Future  -     CT chest w IV contrast; Future  -     Clinic Appointment Request; Future  -     Comprehensive metabolic panel; Future  -     CBC and Auto Differential; Future  -     Infusion Appointment Request; Future  -     Port Flush; Future  -     Infusion Appointment Request; Future  -     TSH with reflex to Free T4 if abnormal; Future  -     ACTH; Future  Adenocarcinoma of gastroesophageal junction (CMS/HCC)  -     CT chest w IV contrast; Future  -     Clinic Appointment Request; Future  -     Comprehensive metabolic panel; Future  -     CBC and Auto Differential;  Future  -     Infusion Appointment Request; Future  -     Port Flush; Future  -     Infusion Appointment Request; Future  -     TSH with reflex to Free T4 if abnormal; Future  -     ACTH; Future  Pruritus, unspecified  -     TSH with reflex to Free T4 if abnormal; Future    Renay Lau MD  Hematology/Oncology  [facility]    Scribe Attestation  By signing my name below, Connie ROMERO Scribe attest that this documentation has been prepared under the direction and in the presence of Renay Lau MD.

## 2023-11-07 ENCOUNTER — TELEPHONE (OUTPATIENT)
Dept: HEMATOLOGY/ONCOLOGY | Facility: CLINIC | Age: 73
End: 2023-11-07
Payer: MEDICARE

## 2023-11-07 NOTE — TELEPHONE ENCOUNTER
I attempted to call Sarah to ask her if she would be okay with seeing Hector MONTGOMERY tomorrow 11/8/23 at 930AM instead of seeing Dr. Lau tomorrow 11/8/23 at 930AM. I was unable to leave a message. I will call back later.

## 2023-11-07 NOTE — TELEPHONE ENCOUNTER
Detailed message left on patient's identifiable voicemail by this  asking to call office to confirm or deny change of provider tomorrow, 11/8/23 from Dr. Lau to Hector Gomez at 9:30am, with arrival time of 9:10am.

## 2023-11-08 ENCOUNTER — OFFICE VISIT (OUTPATIENT)
Dept: HEMATOLOGY/ONCOLOGY | Facility: CLINIC | Age: 73
End: 2023-11-08
Payer: MEDICARE

## 2023-11-08 ENCOUNTER — INFUSION (OUTPATIENT)
Dept: HEMATOLOGY/ONCOLOGY | Facility: CLINIC | Age: 73
End: 2023-11-08
Payer: MEDICARE

## 2023-11-08 VITALS
HEART RATE: 61 BPM | WEIGHT: 112.66 LBS | HEIGHT: 62 IN | OXYGEN SATURATION: 97 % | DIASTOLIC BLOOD PRESSURE: 84 MMHG | TEMPERATURE: 96.8 F | RESPIRATION RATE: 16 BRPM | BODY MASS INDEX: 20.73 KG/M2 | SYSTOLIC BLOOD PRESSURE: 135 MMHG

## 2023-11-08 DIAGNOSIS — L29.9 PRURITUS, UNSPECIFIED: ICD-10-CM

## 2023-11-08 DIAGNOSIS — C15.9 ADENOCARCINOMA OF ESOPHAGUS (MULTI): ICD-10-CM

## 2023-11-08 DIAGNOSIS — C16.0 ADENOCARCINOMA OF GASTROESOPHAGEAL JUNCTION (MULTI): ICD-10-CM

## 2023-11-08 LAB
BASOPHILS # BLD AUTO: 0.06 X10*3/UL (ref 0–0.1)
BASOPHILS NFR BLD AUTO: 0.9 %
CORTIS SERPL-MCNC: 8.1 UG/DL (ref 2.5–20)
EOSINOPHIL # BLD AUTO: 0.22 X10*3/UL (ref 0–0.4)
EOSINOPHIL NFR BLD AUTO: 3.4 %
ERYTHROCYTE [DISTWIDTH] IN BLOOD BY AUTOMATED COUNT: 15.4 % (ref 11.5–14.5)
HCT VFR BLD AUTO: 40.4 % (ref 36–46)
HGB BLD-MCNC: 13.2 G/DL (ref 12–16)
IMM GRANULOCYTES # BLD AUTO: 0.01 X10*3/UL (ref 0–0.5)
IMM GRANULOCYTES NFR BLD AUTO: 0.2 % (ref 0–0.9)
LYMPHOCYTES # BLD AUTO: 2.47 X10*3/UL (ref 0.8–3)
LYMPHOCYTES NFR BLD AUTO: 38.4 %
MCH RBC QN AUTO: 33.8 PG (ref 26–34)
MCHC RBC AUTO-ENTMCNC: 32.7 G/DL (ref 32–36)
MCV RBC AUTO: 104 FL (ref 80–100)
MONOCYTES # BLD AUTO: 0.73 X10*3/UL (ref 0.05–0.8)
MONOCYTES NFR BLD AUTO: 11.3 %
NEUTROPHILS # BLD AUTO: 2.95 X10*3/UL (ref 1.6–5.5)
NEUTROPHILS NFR BLD AUTO: 45.8 %
PLATELET # BLD AUTO: 237 X10*3/UL (ref 150–450)
RBC # BLD AUTO: 3.9 X10*6/UL (ref 4–5.2)
WBC # BLD AUTO: 6.4 X10*3/UL (ref 4.4–11.3)

## 2023-11-08 PROCEDURE — 82533 TOTAL CORTISOL: CPT

## 2023-11-08 PROCEDURE — 1126F AMNT PAIN NOTED NONE PRSNT: CPT | Performed by: INTERNAL MEDICINE

## 2023-11-08 PROCEDURE — 36591 DRAW BLOOD OFF VENOUS DEVICE: CPT

## 2023-11-08 PROCEDURE — 85025 COMPLETE CBC W/AUTO DIFF WBC: CPT

## 2023-11-08 PROCEDURE — 99214 OFFICE O/P EST MOD 30 MIN: CPT | Performed by: INTERNAL MEDICINE

## 2023-11-08 PROCEDURE — 36415 COLL VENOUS BLD VENIPUNCTURE: CPT

## 2023-11-08 PROCEDURE — 1036F TOBACCO NON-USER: CPT | Performed by: INTERNAL MEDICINE

## 2023-11-08 PROCEDURE — 82024 ASSAY OF ACTH: CPT

## 2023-11-08 PROCEDURE — 1159F MED LIST DOCD IN RCRD: CPT | Performed by: INTERNAL MEDICINE

## 2023-11-08 PROCEDURE — 1160F RVW MEDS BY RX/DR IN RCRD: CPT | Performed by: INTERNAL MEDICINE

## 2023-11-08 ASSESSMENT — PAIN SCALES - GENERAL: PAINLEVEL: 0-NO PAIN

## 2023-11-08 NOTE — PROGRESS NOTES
C/C:  Follow up visit    History Of Present Illness  Sarah Chavez is a 73 y.o. female presenting for follow up with surveillance imaging given her h/o esophageal cancer.    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 She is now s/p minimally invasive Kobi Navdeep esophagectomy and jejunostomy tube placement on 7/20/2022. She has completed 1 year of adjuvant Nivolumab with Dr. Lau     Since our last visit she has been doing well, she reports some mild reflux overall controlled with 40mg daily Omeprazole (some breakthrough episodes). Denies regurgitation. She is eating well, no dysphagia. She denies chest pain, shortness of breath, vomiting, food regurgitation. Weight has been stable. Her right shoulder pain improved with PT after Ortho consultation.     She just got back from 3 weeks in [place] - had a good time but did develop strange b/l foot blisters shortly after arrival which are thankfully improving.      By way of review: patient was seen in January 2022 for odynophagia/dysphagia and GERD. She reports having intermittent heartburn, especially at night for the past 4-5 years but this became significantly worse at the end of last year around the time of the holidays. She had lost ~10 pounds since these symptoms started. She had minimal response to BID PPI therapy and she underwent an EGD in January with Dr. Lora which shoed a distal esophageal mass confirmed adenocarcinoma. She had a f/up EUS with Dr. Delgadillo showing at least a T3N0 disease. She is now s/p CROSS regiment neoadj chemoXRT which she finished 5/25. She had notable weight loss during her treatment but did not wish to pursue a feeding tube. Patient states she just started to feel better in terms of her appetite and energy level about 2 weeks ago. Weight has stabilized, no recent gain.      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 Of note she did develop a PE several  months after this surgery        Past Medical History  She has a past medical history of Abdominal wall pain, Abnormal EKG, Bilateral kidney stones, History of mammogram (04/2023), and Thyrotoxicosis.    Social History  She reports that she quit smoking about 10 years ago. Her smoking use included cigarettes. She has never been exposed to tobacco smoke. She has never used smokeless tobacco. She reports current alcohol use. She reports that she does not use drugs.      Medications    Current Outpatient Medications:     ALPRAZolam (Xanax) 0.25 mg tablet, Take 1 tablet (0.25 mg) by mouth as needed at bedtime for anxiety or sleep., Disp: 60 tablet, Rfl: 1    apple cider vinegar 300 mg tablet, TAKE AS DIRECTED., Disp: , Rfl:     ascorbic acid (Vitamin C) 1,000 mg tablet, Take 1 tablet (1,000 mg) by mouth 2 times a day., Disp: , Rfl:     atorvastatin (Lipitor) 20 mg tablet, TAKE 1 TABLET AT BEDTIME (Patient not taking: Reported on 11/8/2023), Disp: 90 tablet, Rfl: 3    b complex vitamins capsule, Take 1 capsule by mouth once daily., Disp: , Rfl:     BACILLUS COAGULANS-INULIN ORAL, Take 1 tablet by mouth 1 (one) time each day., Disp: , Rfl:     cholecalciferol (Vitamin D-3) 125 MCG (5000 UT) capsule, Take 1 capsule (5,000 Units) by mouth once daily., Disp: , Rfl:     echinacea 400 mg capsule, Take 1 capsule by mouth 1 (one) time each day., Disp: , Rfl:     ferrous sulfate 325 (65 Fe) MG tablet, Take 1 tablet (65 mg of iron) by mouth once daily with a meal., Disp: , Rfl:     fish oil concentrate (Omega-3) 120-180 mg capsule, Take 1 capsule (1 g) by mouth once daily., Disp: , Rfl:     ketoconazole (NIZOral) 2 % cream, APPLY SPARINGLY TO AFFECTED AREA(S) ONCE DAILY, Disp: , Rfl:     magnesium 250 mg tablet, Take 1 tablet (250 mg) by mouth once daily., Disp: , Rfl:     melatonin 5 mg capsule, Take 1 capsule by mouth at bedtime., Disp: , Rfl:     omeprazole (PriLOSEC) 40 mg DR capsule, Take 1 capsule (40 mg) by mouth once  daily., Disp: , Rfl:     potassium gluconate 595 mg (99 mg) tablet extended release, Take 1 tablet by mouth 1 (one) time each day., Disp: , Rfl:     psyllium (Metamucil) 0.4 gram capsule, Take as directed, Disp: , Rfl:     sertraline (Zoloft) 100 mg tablet, TAKE 1 TABLET BY MOUTH EVERY DAY (Patient not taking: Reported on 11/8/2023), Disp: 30 tablet, Rfl: 5    traMADol (Ultram) 50 mg tablet, Take 1 tablet (50 mg) by mouth 4 times a day as needed., Disp: , Rfl:     turmeric root extract 500 mg tablet, Take 1 tablet by mouth 1 (one) time each day., Disp: , Rfl:     Allergies  Patient has no known allergies.    Review of Systems:  Review of Systems   Constitutional: No fevers, chills, unexpected weight change  HENT: No sore throat, congestion, or nasal drainage  Eyes: No visual changes or eye itching  Respiratory:  No cough, worsening dyspnea, wheezing  Cardiac: No chest pain, palpitations, or lower extremity edema  Gastrointestinal: No nausea, vomiting, diarrhea. No abdominal pain  Genitourinary: No dysuria or hematuria  Musculoskeletal: No back pain. No significant myalgias or arthralgias  Neurologic: No headaches, dizziness, or seizures.  Hematologic: No east bleeding or bruising.  Psychiatric: No anxiety or depression.    Physical Exam:  Physical Exam  LMP 01/01/1987 (Approximate)   Vital signs stable  Psychiatric:         Mood and Affect: Mood normal.       Relevant Results:     Pathology:  Accession #: [id number]            Pathologist:                   KRISHNA MEEK M.D.  Date of Procedure:    7/20/2022  Date Received:          7/20/2022  Date Reported           8/5/2022  Submitting Physician:   SAGAR DOWNS DO  [facility]  Other External #                                                                   FINAL DIAGNOSIS  A. DISTAL ESOPHAGUS AND PROXIMAL STOMACH, ESOPHAGOGASTRECTOMY:  -- RESIDUAL MICROSCOPIC FOCI OF POORLY DIFFERENTIATED ADENOCARCINOMA (LARGEST  FOCUS: 0.5 CM) OF  THE ESOPHAGOGASTRIC JUNCTION, INVADING INTO THE MUSCULARIS  [place].  -- STATUS POST NEOADJUVANT THERAPY WITH PARTIAL TREATMENT RESPONSE (CAP TUMOR  REGRESSION SCORE: 2).  -- FOCAL LYMPHOVASCULAR INVASION IS IDENTIFIED.  -- PERINEURAL INVASION IS NOT IDENTIFIED.  -- ALL SURGICAL RESECTION MARGINS ARE NEGATIVE.  -- METASTATIC CARCINOMA IN ONE OF FIFTEEN LYMPH NODES (1/15).  -- PATHOLOGIC STAGE (AJCC 8TH EDITION): ypT2 N1 (see synoptic report).  -- STOMACH WITH MILD CHRONIC INACTIVE GASTRITIS; NO HELICOBACTER IS SEEN.    B. STOMACH, ADDITIONAL MARGINS, EXCISION:  -- FULL THICKNESS SEGMENT OF STOMACH WITH NO SIGNIFICANT ABNORMALITY.  -- NEGATIVE FOR TUMOR.    C. ESOPHAGUS AND STOMACH, ANASTOMOTIC DONUTS, EXCISION:    -- FULL THICKNESS SEGMENTS OF ESOPHAGUS AND STOMACH WITH NO SIGNIFICANT  ABNORMALITY.  -- NEGATIVE FOR TUMOR.    Comment: Immunohistochemical stains performed on block A17 demonstrate that the  tumor cells are focally positive for CDX2. Chromogranin is negative, while  INSM1 shows focal non-specific staining. RB1 shows preserved nuclear  expression. Mucicarmine shows no definite intracytoplasmic mucin in tumor  cells.                                                                                                                                             CASE SUMMARY REPORT       SPECIMEN  Procedure:      Esophagogastrectomy  TUMOR  Tumor Site:      Esophagogastric junction (EGJ)  Relationship of Tumor to Esophagogastric Junction:       Tumor midpoint is located at the esophagogastric junction  Histologic Type:      Adenocarcinoma  Histologic Grade:      G3, poorly differentiated, undifferentiated  Tumor Size:      Greatest Dimension (Centimeters): 0.5 cm  Tumor Extent:      Invades muscularis propria  Treatment Effect:      Present, with residual cancer showing evident tumor  regression, but more than single cells or rare small groups of cancer cells  (partial response, score 2)  Lymphovascular Invasion:       Present  Perineural Invasion:      Not identified  MARGINS  Margin Status for Invasive Carcinoma:      All margins negative for invasive  carcinoma   Closest Margin(s) to Invasive Carcinoma:        Proximal   Distance from Invasive Carcinoma to Closest Margin  :          4.9 cm  Margin Status for Dysplasia and Intestinal Metaplasia:       All margins negative for dysplasia  REGIONAL LYMPH NODES  Regional Lymph Node Status:        Tumor present in regional lymph node(s)     Number of Lymph Nodes with Tumor:           1   Number of Lymph Nodes Examined:         15  PATHOLOGIC STAGE CLASSIFICATION (pTNM, AJCC 8th Edition)  TNM Descriptors:      y (post-treatment)  pT Category:      pT2  pN Category:      pN1     Imaging:  CT Chest/abd/pelv was personally reviewed     Assessment/Plan   Diagnoses and all orders for this visit:  Esophageal adenocarcinoma (CMS/HCC)         Sarah Chavez is a 73 y.o. female with distal esophageal adenocarcinoma s/p CROSS regimen, completed 5/25 and s/p minimally invasive Kobi Navdeep esophagectomy and jejunostomy tube placement on 7/20/2022. Her pathology did show ypT2N1 disease (negative margins, small residual tumor, 1/15 lymph nodes involved) and she completed 1 year adjuvant Nivolumab with Dr. Lau.   CT C/A/P today shows no concerning findings. Will plan for repeat scan in 6 months and VV at that time.       Sylvia Poon, DO  Thoracic & Esophageal Surgery

## 2023-11-09 ENCOUNTER — TELEMEDICINE (OUTPATIENT)
Dept: SURGERY | Facility: HOSPITAL | Age: 73
End: 2023-11-09
Payer: MEDICARE

## 2023-11-09 ENCOUNTER — HOSPITAL ENCOUNTER (OUTPATIENT)
Dept: RADIOLOGY | Facility: HOSPITAL | Age: 73
Discharge: HOME | End: 2023-11-09
Payer: MEDICARE

## 2023-11-09 DIAGNOSIS — C15.9 MALIGNANT NEOPLASM OF ESOPHAGUS, UNSPECIFIED (MULTI): ICD-10-CM

## 2023-11-09 DIAGNOSIS — C15.9 ESOPHAGEAL ADENOCARCINOMA (MULTI): Primary | ICD-10-CM

## 2023-11-09 PROCEDURE — 99443 PR PHYS/QHP TELEPHONE EVALUATION 21-30 MIN: CPT | Performed by: STUDENT IN AN ORGANIZED HEALTH CARE EDUCATION/TRAINING PROGRAM

## 2023-11-09 PROCEDURE — 71250 CT THORAX DX C-: CPT | Performed by: RADIOLOGY

## 2023-11-09 PROCEDURE — 74176 CT ABD & PELVIS W/O CONTRAST: CPT | Performed by: RADIOLOGY

## 2023-11-09 PROCEDURE — 71250 CT THORAX DX C-: CPT

## 2023-11-10 LAB — ACTH PLAS-MCNC: 17.5 PG/ML (ref 7.2–63.3)

## 2023-12-12 ENCOUNTER — LAB (OUTPATIENT)
Dept: LAB | Facility: LAB | Age: 73
End: 2023-12-12
Payer: MEDICARE

## 2023-12-12 DIAGNOSIS — R73.9 HYPERGLYCEMIA: ICD-10-CM

## 2023-12-12 DIAGNOSIS — E78.5 DYSLIPIDEMIA: ICD-10-CM

## 2023-12-12 DIAGNOSIS — I51.7 CARDIOMEGALY: ICD-10-CM

## 2023-12-12 LAB
ALBUMIN SERPL BCP-MCNC: 3.8 G/DL (ref 3.4–5)
ALP SERPL-CCNC: 64 U/L (ref 33–136)
ALT SERPL W P-5'-P-CCNC: 14 U/L (ref 7–45)
ANION GAP SERPL CALC-SCNC: 9 MMOL/L (ref 10–20)
AST SERPL W P-5'-P-CCNC: 20 U/L (ref 9–39)
BILIRUB SERPL-MCNC: 0.8 MG/DL (ref 0–1.2)
BUN SERPL-MCNC: 12 MG/DL (ref 6–23)
CALCIUM SERPL-MCNC: 9.2 MG/DL (ref 8.6–10.3)
CHLORIDE SERPL-SCNC: 103 MMOL/L (ref 98–107)
CHOLEST SERPL-MCNC: 217 MG/DL (ref 0–199)
CHOLESTEROL/HDL RATIO: 3.7
CO2 SERPL-SCNC: 33 MMOL/L (ref 21–32)
CREAT SERPL-MCNC: 0.87 MG/DL (ref 0.5–1.05)
EST. AVERAGE GLUCOSE BLD GHB EST-MCNC: 111 MG/DL
GFR SERPL CREATININE-BSD FRML MDRD: 71 ML/MIN/1.73M*2
GLUCOSE SERPL-MCNC: 110 MG/DL (ref 74–99)
HBA1C MFR BLD: 5.5 %
HDLC SERPL-MCNC: 58.1 MG/DL
LDLC SERPL CALC-MCNC: 133 MG/DL
NON HDL CHOLESTEROL: 159 MG/DL (ref 0–149)
POTASSIUM SERPL-SCNC: 4 MMOL/L (ref 3.5–5.3)
PROT SERPL-MCNC: 6.2 G/DL (ref 6.4–8.2)
SODIUM SERPL-SCNC: 141 MMOL/L (ref 136–145)
TRIGL SERPL-MCNC: 131 MG/DL (ref 0–149)
VLDL: 26 MG/DL (ref 0–40)

## 2023-12-12 PROCEDURE — 80061 LIPID PANEL: CPT

## 2023-12-12 PROCEDURE — 80053 COMPREHEN METABOLIC PANEL: CPT

## 2023-12-12 PROCEDURE — 83036 HEMOGLOBIN GLYCOSYLATED A1C: CPT

## 2023-12-19 ENCOUNTER — TELEPHONE (OUTPATIENT)
Dept: PRIMARY CARE | Facility: CLINIC | Age: 73
End: 2023-12-19

## 2023-12-19 ENCOUNTER — OFFICE VISIT (OUTPATIENT)
Dept: PRIMARY CARE | Facility: CLINIC | Age: 73
End: 2023-12-19
Payer: MEDICARE

## 2023-12-19 VITALS
RESPIRATION RATE: 16 BRPM | DIASTOLIC BLOOD PRESSURE: 66 MMHG | OXYGEN SATURATION: 97 % | TEMPERATURE: 97.2 F | WEIGHT: 114 LBS | BODY MASS INDEX: 20.98 KG/M2 | HEIGHT: 62 IN | HEART RATE: 74 BPM | SYSTOLIC BLOOD PRESSURE: 124 MMHG

## 2023-12-19 DIAGNOSIS — E78.5 HYPERLIPIDEMIA, UNSPECIFIED HYPERLIPIDEMIA TYPE: Primary | ICD-10-CM

## 2023-12-19 DIAGNOSIS — M25.519 ACUTE SHOULDER PAIN, UNSPECIFIED LATERALITY: ICD-10-CM

## 2023-12-19 DIAGNOSIS — M06.9 RHEUMATOID ARTHRITIS, INVOLVING UNSPECIFIED SITE, UNSPECIFIED WHETHER RHEUMATOID FACTOR PRESENT (MULTI): ICD-10-CM

## 2023-12-19 DIAGNOSIS — F32.0 DEPRESSION, MAJOR, SINGLE EPISODE, MILD (CMS-HCC): ICD-10-CM

## 2023-12-19 DIAGNOSIS — C73 PAPILLARY THYROID CARCINOMA (MULTI): ICD-10-CM

## 2023-12-19 DIAGNOSIS — K50.919 CROHN'S DISEASE WITH COMPLICATION, UNSPECIFIED GASTROINTESTINAL TRACT LOCATION (MULTI): ICD-10-CM

## 2023-12-19 DIAGNOSIS — C77.2 SECONDARY AND UNSPECIFIED MALIGNANT NEOPLASM OF INTRA-ABDOMINAL LYMPH NODES (MULTI): ICD-10-CM

## 2023-12-19 DIAGNOSIS — C15.9 ESOPHAGEAL ADENOCARCINOMA (MULTI): ICD-10-CM

## 2023-12-19 DIAGNOSIS — F41.9 ANXIETY: ICD-10-CM

## 2023-12-19 DIAGNOSIS — R73.9 HYPERGLYCEMIA: ICD-10-CM

## 2023-12-19 PROCEDURE — 1160F RVW MEDS BY RX/DR IN RCRD: CPT | Performed by: FAMILY MEDICINE

## 2023-12-19 PROCEDURE — 1036F TOBACCO NON-USER: CPT | Performed by: FAMILY MEDICINE

## 2023-12-19 PROCEDURE — 1159F MED LIST DOCD IN RCRD: CPT | Performed by: FAMILY MEDICINE

## 2023-12-19 PROCEDURE — 1126F AMNT PAIN NOTED NONE PRSNT: CPT | Performed by: FAMILY MEDICINE

## 2023-12-19 PROCEDURE — 99214 OFFICE O/P EST MOD 30 MIN: CPT | Performed by: FAMILY MEDICINE

## 2023-12-19 RX ORDER — ROSUVASTATIN CALCIUM 5 MG/1
5 TABLET, COATED ORAL DAILY
Qty: 90 TABLET | Refills: 3 | Status: SHIPPED | OUTPATIENT
Start: 2023-12-19 | End: 2024-12-13

## 2023-12-19 NOTE — PROGRESS NOTES
Covid vax: UTD  Flu: UTD  Pneumo: UTD  RSV: advised  Shingles: UTD    CRC: due 2024  Mammogram: 4/2023  Pap: hysterectomy  Lmp: hysterectomy

## 2023-12-19 NOTE — PROGRESS NOTES
Subjective   Patient ID: Sarah Chavez is a 73 y.o. female who presents for Hyperglycemia, Hyperlipidemia, Anxiety, Depression, Med Management, and Medication Problem (Jill took her off Lipitor in October due to myalgia).  Covid vax: UTD  Flu: UTD  Pneumo: UTD  RSV: advised  Shingles: UTD     CRC: due 2024  Mammogram: 4/2023  Pap: hysterectomy  Lmp: hysterectomy   had blisters of toes in [place]   Now all gone  Given steroids and abtcs  Looked like bullous pemphigoid    HPI  Patient Active Problem List   Diagnosis    Actinic keratosis    Anxiety    Athscl heart disease of native coronary artery w/o ang pctrs    Cancer of abdominal esophagus (CMS/HCC)    Candidal intertrigo    Cardiomegaly    Cervical paraspinal muscle spasm    Chronic pancreatitis (CMS/HCC)    Depression, major, single episode, mild (CMS/HCC)    Dysuria    Elevated platelet count    Esophageal adenocarcinoma (CMS/HCC)    GERD (gastroesophageal reflux disease)    Globus sensation    Hematuria    Hyperglycemia    Intraepithelial neoplasm of pancreas    Mild aortic insufficiency    Mild diastolic dysfunction    Nausea and/or vomiting    Odynophagia    Papillary thyroid carcinoma (CMS/HCC)    Pleural effusion    Post-splenectomy    Pulmonary embolism (CMS/HCC)    Rheumatoid arthritis (CMS/HCC)    Seasonal allergies    Sleep difficulties    Goiter    Thyroid nodule    Trigger point of neck    Urinary tract infection    Uses feeding tube    Overweight with body mass index (BMI) of 25 to 25.9 in adult    Crohn's disease (CMS/HCC)    Encounter for Medicare annual wellness exam    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)    Gastrostomy status (CMS/HCC)    Protein-calorie malnutrition, unspecified severity (CMS/HCC)       Past Surgical History:   Procedure Laterality Date    APPENDECTOMY  1981    CHOLECYSTECTOMY  1994    COLONOSCOPY  09/2019    early adenomatous glandular changes-due 2024    ESOPHAGUS  "SURGERY      HYSTERECTOMY  1987    [facility]    PANCREAS SURGERY  2019    30% of pancreas removed    SHOULDER SURGERY Right 1977    1 pin    SPLENECTOMY, TOTAL         Review of Systems  This patient has   NO history of recent Covid nor flu symptoms,  NO Fever nor chills,  NO Chest pain, shortness of breath nor paroxysmal nocturnal dyspnea,  NO Nausea, vomiting, nor diarrhea,  NO Hematochezia nor melena,  NO Dysuria, hematuria, nor new incontinence issues  NO new severe headaches nor neurological complaints,  NO new issues with anxiety nor depression nor new psychiatric complaints,  NO suicidal nor homicidal ideations.     OBJECTIVE:  /66   Pulse 74   Temp 36.2 °C (97.2 °F) (Temporal)   Resp 16   Ht 1.572 m (5' 1.89\")   Wt 51.7 kg (114 lb)   LMP 01/01/1987 (Approximate)   SpO2 97%   BMI 20.93 kg/m²      General:  alert, oriented, no acute distress.  No obvious skin rashes noted.   No gait disturbance noted.    Mood is pleasant, not tearful, no signs of emotional distress.  Not appearing intoxicated or altered.   No voiced delusions,   Normal, appropriate behavior.    HEENT: Normocephalic, atraumatic,   Pupils round, reactive to light  Extraocular motions intact and wnl  Tympanic membranes normal    Neck: no nuchal rigidity  No masses palpable.  No carotid bruits.  No thyromegaly.    Respiratory: Equal breath sounds  No wheezes,    rales,    nor rhonchi  No respiratory distress.    Heart: Regular rate and rhythm, no    murmurs  no rubs/gallops    Abdomen: no masses palpable, nontender, no rebound nor guarding.    Extremities: NO cyanosis noted, no clubbing.   No edema noted.  2+dorsalis pedis pulses.    Normal-not antalgic, steady gait.    Lab on 12/12/2023   Component Date Value Ref Range Status    Glucose 12/12/2023 110 (H)  74 - 99 mg/dL Final    Sodium 12/12/2023 141  136 - 145 mmol/L Final    Potassium 12/12/2023 4.0  3.5 - 5.3 mmol/L Final    Chloride 12/12/2023 103  98 - 107 mmol/L Final    " Bicarbonate 12/12/2023 33 (H)  21 - 32 mmol/L Final    Anion Gap 12/12/2023 9 (L)  10 - 20 mmol/L Final    Urea Nitrogen 12/12/2023 12  6 - 23 mg/dL Final    Creatinine 12/12/2023 0.87  0.50 - 1.05 mg/dL Final    eGFR 12/12/2023 71  >60 mL/min/1.73m*2 Final    Calculations of estimated GFR are performed using the 2021 CKD-EPI Study Refit equation without the race variable for the IDMS-Traceable creatinine methods.  https://jasn.asnjournals.org/content/early/2021/09/22/ASN.0313792204    Calcium 12/12/2023 9.2  8.6 - 10.3 mg/dL Final    Albumin 12/12/2023 3.8  3.4 - 5.0 g/dL Final    Alkaline Phosphatase 12/12/2023 64  33 - 136 U/L Final    Total Protein 12/12/2023 6.2 (L)  6.4 - 8.2 g/dL Final    AST 12/12/2023 20  9 - 39 U/L Final    Bilirubin, Total 12/12/2023 0.8  0.0 - 1.2 mg/dL Final    ALT 12/12/2023 14  7 - 45 U/L Final    Patients treated with Sulfasalazine may generate falsely decreased results for ALT.    Hemoglobin A1C 12/12/2023 5.5  see below % Final    Estimated Average Glucose 12/12/2023 111  Not Established mg/dL Final    Cholesterol 12/12/2023 217 (H)  0 - 199 mg/dL Final          Age      Desirable   Borderline High   High     0-19 Y     0 - 169       170 - 199     >/= 200    20-24 Y     0 - 189       190 - 224     >/= 225         >24 Y     0 - 199       200 - 239     >/= 240   **All ranges are based on fasting samples. Specific   therapeutic targets will vary based on patient-specific   cardiac risk.    Pediatric guidelines reference:Pediatrics 2011, 128(S5).Adult guidelines reference: NCEP ATPIII Guidelines,LUTHER 2001, 258:2486-97    Venipuncture immediately after or during the administration of Metamizole may lead to falsely low results. Testing should be performed immediately prior to Metamizole dosing.    HDL-Cholesterol 12/12/2023 58.1  mg/dL Final      Age       Very Low   Low     Normal    High    0-19 Y    < 35      < 40     40-45     ----  20-24 Y    ----     < 40      >45      ----         >24 Y      ----     < 40     40-60      >60      Cholesterol/HDL Ratio 12/12/2023 3.7   Final      Ref Values  Desirable  < 3.4  High Risk  > 5.0    LDL Calculated 12/12/2023 133 (H)  <=99 mg/dL Final                                Near   Borderline      AGE      Desirable  Optimal    High     High     Very High     0-19 Y     0 - 109     ---    110-129   >/= 130     ----    20-24 Y     0 - 119     ---    120-159   >/= 160     ----      >24 Y     0 -  99   100-129  130-159   160-189     >/=190      VLDL 12/12/2023 26  0 - 40 mg/dL Final    Triglycerides 12/12/2023 131  0 - 149 mg/dL Final       Age         Desirable   Borderline High   High     Very High   0 D-90 D    19 - 174         ----         ----        ----  91 D- 9 Y     0 -  74        75 -  99     >/= 100      ----    10-19 Y     0 -  89        90 - 129     >/= 130      ----    20-24 Y     0 - 114       115 - 149     >/= 150      ----         >24 Y     0 - 149       150 - 199    200- 499    >/= 500    Venipuncture immediately after or during the administration of Metamizole may lead to falsely low results. Testing should be performed immediately prior to Metamizole dosing.    Non HDL Cholesterol 12/12/2023 159 (H)  0 - 149 mg/dL Final          Age       Desirable   Borderline High   High     Very High     0-19 Y     0 - 119       120 - 144     >/= 145    >/= 160    20-24 Y     0 - 149       150 - 189     >/= 190      ----         >24 Y    30 mg/dL above LDL Cholesterol goal     Infusion on 11/08/2023   Component Date Value Ref Range Status    WBC 11/08/2023 6.4  4.4 - 11.3 x10*3/uL Final    RBC 11/08/2023 3.90 (L)  4.00 - 5.20 x10*6/uL Final    Hemoglobin 11/08/2023 13.2  12.0 - 16.0 g/dL Final    Hematocrit 11/08/2023 40.4  36.0 - 46.0 % Final    MCV 11/08/2023 104 (H)  80 - 100 fL Final    MCH 11/08/2023 33.8  26.0 - 34.0 pg Final    MCHC 11/08/2023 32.7  32.0 - 36.0 g/dL Final    RDW 11/08/2023 15.4 (H)  11.5 - 14.5 % Final    Platelets 11/08/2023 237   150 - 450 x10*3/uL Final    Neutrophils % 11/08/2023 45.8  40.0 - 80.0 % Final    Immature Granulocytes %, Automated 11/08/2023 0.2  0.0 - 0.9 % Final    Immature Granulocyte Count (IG) includes promyelocytes, myelocytes and metamyelocytes but does not include bands. Percent differential counts (%) should be interpreted in the context of the absolute cell counts (cells/UL).    Lymphocytes % 11/08/2023 38.4  13.0 - 44.0 % Final    Monocytes % 11/08/2023 11.3  2.0 - 10.0 % Final    Eosinophils % 11/08/2023 3.4  0.0 - 6.0 % Final    Basophils % 11/08/2023 0.9  0.0 - 2.0 % Final    Neutrophils Absolute 11/08/2023 2.95  1.60 - 5.50 x10*3/uL Final    Percent differential counts (%) should be interpreted in the context of the absolute cell counts (cells/uL).    Immature Granulocytes Absolute, Au* 11/08/2023 0.01  0.00 - 0.50 x10*3/uL Final    Lymphocytes Absolute 11/08/2023 2.47  0.80 - 3.00 x10*3/uL Final    Monocytes Absolute 11/08/2023 0.73  0.05 - 0.80 x10*3/uL Final    Eosinophils Absolute 11/08/2023 0.22  0.00 - 0.40 x10*3/uL Final    Basophils Absolute 11/08/2023 0.06  0.00 - 0.10 x10*3/uL Final    Adrenocorticotropic Hormone (ACTH) 11/08/2023 17.5  7.2 - 63.3 pg/mL Final    INTERPRETIVE INFORMATION: Adrenocorticotropic Hormone    Reference interval based on samples collected between 7 a.m. and   10 a.m.  No reference intervals established for p.m. collections.    Pediatric reference values are the same as adults (Acta Paediatr   Scand 1981;70:341-345).  This assay measures intact ACTH 1-39;   some types of synthetic ACTH and ACTH fragments are not detected   by this assay.  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  If any unwanted side effects occur-discontinue medicine and call the office for discussion.    Labs in spring  The patient is aware that results will be forthcoming of ALL planned labs and or tests. If no results are received on my chart or by letter within 1 - 3 weeks, the patient is aware they need to call to obtain results, as this is not usual. Also, if any new conditions arise, or current condition worsens, it is understood that sooner appointment should be made or urgent care/convenient care or emergency room treatment should be sought depending on severity. Otherwise follow up for recheck at regular intervals as we have discussed, at least yearly.  Mood is stable  No hi/si  Rare xanax Overuse and abuse potential discussed with patient (parents if applicable)  If mood deterioration, status change etc on medicine, suicidal or homicidal ideations -patient agrees to proceed with crisis plan-in place-including-not limited to contacting family, our office and or proceeding to ER.    It is recommended that OARRS reports be checked and patient have appointment at least every 3months(6 for certain medicines only)  If the agreement signed (controlled substance agreement) is violated prescriptions may be stopped abruptly and patient /family understands and agrees to this.  Another reason for termination of agreement is if we have concern for abuse or overuse and it is also recommended that patient take responsibility to try to taper and minimize use of these medicines frequently trying to  limit or gradually taper to discontinuation.    Patient is aware that side effects such as insomnia, unexpected weight changes are unexpected and should result in discontinuation.  Always use caution and AVOID operating machinery(driving etc) on pain medicines or CNS depressants and avoid combining together OR with alcohol. If opioids are prescribed patient understands the benefits of narcan and was offered prescription.  Follow up sooner if condition deteriorates or problems arise.    Agrees to regular follow and counseling for maximum benefits.      And appt

## 2024-01-05 ENCOUNTER — INFUSION (OUTPATIENT)
Dept: HEMATOLOGY/ONCOLOGY | Facility: CLINIC | Age: 74
End: 2024-01-05
Payer: MEDICARE

## 2024-01-05 DIAGNOSIS — C15.9 ESOPHAGEAL ADENOCARCINOMA (MULTI): ICD-10-CM

## 2024-01-05 DIAGNOSIS — C15.9 ADENOCARCINOMA OF ESOPHAGUS (MULTI): ICD-10-CM

## 2024-01-05 DIAGNOSIS — C16.0 ADENOCARCINOMA OF GASTROESOPHAGEAL JUNCTION (MULTI): ICD-10-CM

## 2024-01-05 PROCEDURE — 2500000004 HC RX 250 GENERAL PHARMACY W/ HCPCS (ALT 636 FOR OP/ED)

## 2024-01-05 PROCEDURE — 96523 IRRIG DRUG DELIVERY DEVICE: CPT

## 2024-01-05 RX ORDER — HEPARIN SODIUM,PORCINE/PF 10 UNIT/ML
50 SYRINGE (ML) INTRAVENOUS AS NEEDED
Status: CANCELLED | OUTPATIENT
Start: 2024-01-05

## 2024-01-05 RX ORDER — HEPARIN 100 UNIT/ML
500 SYRINGE INTRAVENOUS AS NEEDED
Status: CANCELLED | OUTPATIENT
Start: 2024-01-05

## 2024-01-05 RX ORDER — HEPARIN 100 UNIT/ML
500 SYRINGE INTRAVENOUS AS NEEDED
Status: DISCONTINUED | OUTPATIENT
Start: 2024-01-05 | End: 2024-01-05 | Stop reason: HOSPADM

## 2024-01-05 RX ORDER — HEPARIN SODIUM,PORCINE/PF 10 UNIT/ML
50 SYRINGE (ML) INTRAVENOUS AS NEEDED
Status: DISCONTINUED | OUTPATIENT
Start: 2024-01-05 | End: 2024-01-05 | Stop reason: HOSPADM

## 2024-01-05 RX ADMIN — HEPARIN 500 UNITS: 100 SYRINGE at 11:30

## 2024-01-16 ENCOUNTER — TELEPHONE (OUTPATIENT)
Dept: PRIMARY CARE | Facility: CLINIC | Age: 74
End: 2024-01-16
Payer: MEDICARE

## 2024-01-16 DIAGNOSIS — F41.9 ANXIETY: ICD-10-CM

## 2024-01-16 RX ORDER — ALPRAZOLAM 0.25 MG/1
0.25 TABLET ORAL NIGHTLY PRN
Qty: 60 TABLET | Refills: 1 | Status: SHIPPED | OUTPATIENT
Start: 2024-01-16 | End: 2024-01-26 | Stop reason: SDUPTHER

## 2024-01-17 ENCOUNTER — OFFICE VISIT (OUTPATIENT)
Dept: ORTHOPEDIC SURGERY | Facility: CLINIC | Age: 74
End: 2024-01-17
Payer: MEDICARE

## 2024-01-17 DIAGNOSIS — M25.519 ACUTE SHOULDER PAIN, UNSPECIFIED LATERALITY: ICD-10-CM

## 2024-01-17 DIAGNOSIS — M75.81 ROTATOR CUFF TENDINITIS, RIGHT: Primary | ICD-10-CM

## 2024-01-17 PROCEDURE — 1160F RVW MEDS BY RX/DR IN RCRD: CPT | Performed by: ORTHOPAEDIC SURGERY

## 2024-01-17 PROCEDURE — 1036F TOBACCO NON-USER: CPT | Performed by: ORTHOPAEDIC SURGERY

## 2024-01-17 PROCEDURE — 1126F AMNT PAIN NOTED NONE PRSNT: CPT | Performed by: ORTHOPAEDIC SURGERY

## 2024-01-17 PROCEDURE — 99213 OFFICE O/P EST LOW 20 MIN: CPT | Performed by: ORTHOPAEDIC SURGERY

## 2024-01-17 NOTE — PROGRESS NOTES
History of present illness: Patient with combination of periscapular pain paresthesias burning around the medial scapular border but is not radicular it does not go down the arm she had shoulder pain discomfort not relieved by us a bursal injection she comes in for evaluation assessment    Physical exam:    General: No acute distress or breathing difficulty or discomfort, pleasant and cooperative with the examination.    Extremities: The right shoulder was inspected and was found to have no obvious deformity.  There was tenderness to touch over the lateral edges of the shoulder over the rotator cuff insertion.  Active forward flexion 110 degrees, external rotation to 50 degrees, abduction to 45 degrees, and internal rotation to the level of L2.    At this time the shoulder is neurovascular tact and neurosensory intact.  Motor intact C5-T1.  There was no obvious neck pain or radiculopathy noted.  There was no gross instability or adhesive capsulitis symptoms.  There was no evidence of apprehension or apprehension suppression.    Strength was tested in 4 planes with weakness in the supraspinatus strength testing and external rotation position.  There was no strength deficit in internal rotation.  Impingement signs were positive both supine and standing for impingement test type I and II.  There was mild pain over the bicipital groove with a positive speeds sign    Diagnostic studies: MRI shows cuff tendinitis supra infraspinatus subscapularis but no obvious tearing mild to moderate arthritic change    Impression: Right shoulder symptomatic cuff tendinitis with a little bit underlying arthritis    She also has some periscapular paresthesias burning but nothing radicular that goes down the arm into the hand wrist or elbow    Plan: For diagnostic reasons a fluoroscopy guided intra-articular injection be very beneficial    If this gives her complete total relief we then know that the shoulder joint is more of the problem  with the arthritis tendinitis options would then include discussion about proceeding with arthroscopy cleanout but would like to hold off at any surgery at this time    If she gets no relief whatsoever we probably need EMG and cervical spine workup at that time

## 2024-01-25 ENCOUNTER — TELEPHONE (OUTPATIENT)
Dept: PRIMARY CARE | Facility: CLINIC | Age: 74
End: 2024-01-25
Payer: MEDICARE

## 2024-01-25 DIAGNOSIS — F41.9 ANXIETY: ICD-10-CM

## 2024-01-25 NOTE — TELEPHONE ENCOUNTER
patient called javier again the Xanax need to be sent to [facility] she is not in [place] at this time .... also she has been on the crestor befor and it didnt work ... please rahel

## 2024-01-26 ENCOUNTER — HOSPITAL ENCOUNTER (OUTPATIENT)
Dept: RADIOLOGY | Facility: HOSPITAL | Age: 74
Discharge: HOME | End: 2024-01-26
Payer: MEDICARE

## 2024-01-26 DIAGNOSIS — M25.519 ACUTE SHOULDER PAIN, UNSPECIFIED LATERALITY: ICD-10-CM

## 2024-01-26 PROCEDURE — 77002 NEEDLE LOCALIZATION BY XRAY: CPT | Mod: RIGHT SIDE | Performed by: STUDENT IN AN ORGANIZED HEALTH CARE EDUCATION/TRAINING PROGRAM

## 2024-01-26 PROCEDURE — 20610 DRAIN/INJ JOINT/BURSA W/O US: CPT | Mod: RIGHT SIDE | Performed by: STUDENT IN AN ORGANIZED HEALTH CARE EDUCATION/TRAINING PROGRAM

## 2024-01-26 PROCEDURE — 2500000005 HC RX 250 GENERAL PHARMACY W/O HCPCS: Performed by: ORTHOPAEDIC SURGERY

## 2024-01-26 PROCEDURE — 2550000001 HC RX 255 CONTRASTS: Performed by: ORTHOPAEDIC SURGERY

## 2024-01-26 PROCEDURE — 2500000004 HC RX 250 GENERAL PHARMACY W/ HCPCS (ALT 636 FOR OP/ED): Performed by: ORTHOPAEDIC SURGERY

## 2024-01-26 PROCEDURE — 77002 NEEDLE LOCALIZATION BY XRAY: CPT | Mod: RT

## 2024-01-26 RX ORDER — BUPIVACAINE HYDROCHLORIDE AND EPINEPHRINE 5; 5 MG/ML; UG/ML
3 INJECTION, SOLUTION EPIDURAL; INTRACAUDAL; PERINEURAL ONCE
Status: COMPLETED | OUTPATIENT
Start: 2024-01-26 | End: 2024-01-26

## 2024-01-26 RX ORDER — TRIAMCINOLONE ACETONIDE 40 MG/ML
40 INJECTION, SUSPENSION INTRA-ARTICULAR; INTRAMUSCULAR ONCE
Status: COMPLETED | OUTPATIENT
Start: 2024-01-26 | End: 2024-01-26

## 2024-01-26 RX ORDER — LIDOCAINE HYDROCHLORIDE 10 MG/ML
8 INJECTION, SOLUTION EPIDURAL; INFILTRATION; INTRACAUDAL; PERINEURAL ONCE
Status: COMPLETED | OUTPATIENT
Start: 2024-01-26 | End: 2024-01-26

## 2024-01-26 RX ORDER — ALPRAZOLAM 0.25 MG/1
0.25 TABLET ORAL NIGHTLY PRN
Qty: 60 TABLET | Refills: 1 | Status: SHIPPED | OUTPATIENT
Start: 2024-01-26 | End: 2024-06-03 | Stop reason: SDUPTHER

## 2024-01-26 RX ADMIN — IOHEXOL 5 ML: 350 INJECTION, SOLUTION INTRAVENOUS at 15:18

## 2024-01-26 RX ADMIN — TRIAMCINOLONE ACETONIDE 40 MG: 40 INJECTION, SUSPENSION INTRA-ARTICULAR; INTRAMUSCULAR at 15:30

## 2024-01-26 RX ADMIN — LIDOCAINE HYDROCHLORIDE 80 MG: 10 INJECTION, SOLUTION EPIDURAL; INFILTRATION; INTRACAUDAL; PERINEURAL at 15:30

## 2024-01-26 RX ADMIN — BUPIVACAINE HYDROCHLORIDE AND EPINEPHRINE BITARTRATE 3 ML: 5; .005 INJECTION, SOLUTION EPIDURAL; INTRACAUDAL; PERINEURAL at 15:30

## 2024-03-13 ENCOUNTER — OFFICE VISIT (OUTPATIENT)
Dept: ORTHOPEDIC SURGERY | Facility: CLINIC | Age: 74
End: 2024-03-13
Payer: MEDICARE

## 2024-03-13 ENCOUNTER — LAB (OUTPATIENT)
Dept: LAB | Facility: LAB | Age: 74
End: 2024-03-13
Payer: MEDICARE

## 2024-03-13 DIAGNOSIS — C15.9 MALIGNANT NEOPLASM OF ESOPHAGUS, UNSPECIFIED (MULTI): ICD-10-CM

## 2024-03-13 DIAGNOSIS — C16.0 MALIGNANT NEOPLASM OF CARDIA (MULTI): ICD-10-CM

## 2024-03-13 DIAGNOSIS — M54.12 CERVICAL RADICULOPATHY: ICD-10-CM

## 2024-03-13 DIAGNOSIS — L29.9 PRURITUS, UNSPECIFIED: Primary | ICD-10-CM

## 2024-03-13 DIAGNOSIS — L29.9 PRURITUS, UNSPECIFIED: ICD-10-CM

## 2024-03-13 DIAGNOSIS — C15.9 ADENOCARCINOMA OF ESOPHAGUS (MULTI): ICD-10-CM

## 2024-03-13 DIAGNOSIS — C16.0 ADENOCARCINOMA OF GASTROESOPHAGEAL JUNCTION (MULTI): ICD-10-CM

## 2024-03-13 LAB
ALBUMIN SERPL BCP-MCNC: 4.3 G/DL (ref 3.4–5)
ALP SERPL-CCNC: 109 U/L (ref 33–136)
ALT SERPL W P-5'-P-CCNC: 37 U/L (ref 7–45)
ANION GAP SERPL CALC-SCNC: 11 MMOL/L (ref 10–20)
AST SERPL W P-5'-P-CCNC: 43 U/L (ref 9–39)
BASOPHILS # BLD AUTO: 0.07 X10*3/UL (ref 0–0.1)
BASOPHILS NFR BLD AUTO: 1.1 %
BILIRUB SERPL-MCNC: 0.6 MG/DL (ref 0–1.2)
BUN SERPL-MCNC: 10 MG/DL (ref 6–23)
CALCIUM SERPL-MCNC: 9.4 MG/DL (ref 8.6–10.3)
CHLORIDE SERPL-SCNC: 104 MMOL/L (ref 98–107)
CO2 SERPL-SCNC: 31 MMOL/L (ref 21–32)
CREAT SERPL-MCNC: 0.77 MG/DL (ref 0.5–1.05)
EGFRCR SERPLBLD CKD-EPI 2021: 82 ML/MIN/1.73M*2
EOSINOPHIL # BLD AUTO: 0.18 X10*3/UL (ref 0–0.4)
EOSINOPHIL NFR BLD AUTO: 2.8 %
ERYTHROCYTE [DISTWIDTH] IN BLOOD BY AUTOMATED COUNT: 14.5 % (ref 11.5–14.5)
GLUCOSE SERPL-MCNC: 99 MG/DL (ref 74–99)
HCT VFR BLD AUTO: 44.3 % (ref 36–46)
HGB BLD-MCNC: 14.6 G/DL (ref 12–16)
IMM GRANULOCYTES # BLD AUTO: 0.01 X10*3/UL (ref 0–0.5)
IMM GRANULOCYTES NFR BLD AUTO: 0.2 % (ref 0–0.9)
LYMPHOCYTES # BLD AUTO: 2.79 X10*3/UL (ref 0.8–3)
LYMPHOCYTES NFR BLD AUTO: 44 %
MCH RBC QN AUTO: 34 PG (ref 26–34)
MCHC RBC AUTO-ENTMCNC: 33 G/DL (ref 32–36)
MCV RBC AUTO: 103 FL (ref 80–100)
MONOCYTES # BLD AUTO: 0.74 X10*3/UL (ref 0.05–0.8)
MONOCYTES NFR BLD AUTO: 11.7 %
NEUTROPHILS # BLD AUTO: 2.55 X10*3/UL (ref 1.6–5.5)
NEUTROPHILS NFR BLD AUTO: 40.2 %
NRBC BLD-RTO: 0 /100 WBCS (ref 0–0)
PLATELET # BLD AUTO: 243 X10*3/UL (ref 150–450)
POTASSIUM SERPL-SCNC: 3.8 MMOL/L (ref 3.5–5.3)
PROT SERPL-MCNC: 6.6 G/DL (ref 6.4–8.2)
RBC # BLD AUTO: 4.3 X10*6/UL (ref 4–5.2)
SODIUM SERPL-SCNC: 142 MMOL/L (ref 136–145)
TSH SERPL-ACNC: 3.15 MIU/L (ref 0.44–3.98)
WBC # BLD AUTO: 6.3 X10*3/UL (ref 4.4–11.3)

## 2024-03-13 PROCEDURE — 85025 COMPLETE CBC W/AUTO DIFF WBC: CPT

## 2024-03-13 PROCEDURE — 1157F ADVNC CARE PLAN IN RCRD: CPT | Performed by: ORTHOPAEDIC SURGERY

## 2024-03-13 PROCEDURE — 84443 ASSAY THYROID STIM HORMONE: CPT

## 2024-03-13 PROCEDURE — 1036F TOBACCO NON-USER: CPT | Performed by: ORTHOPAEDIC SURGERY

## 2024-03-13 PROCEDURE — 99213 OFFICE O/P EST LOW 20 MIN: CPT | Performed by: ORTHOPAEDIC SURGERY

## 2024-03-13 PROCEDURE — 80053 COMPREHEN METABOLIC PANEL: CPT

## 2024-03-13 PROCEDURE — 82024 ASSAY OF ACTH: CPT

## 2024-03-13 NOTE — PROGRESS NOTES
History of present illness: Patient with ongoing cervical spine pain burning paresthesias numbness base of the neck C-spine shoulder blade medial scapula.    Pain then went down to her shoulder    She had a some bursal injection without relief    She had a fluoroscopic intra-articular injection right shoulder without any relief this clearly tells us that this is coming from the cervical spine    She had an MRI that showed a little bit of cuff inflammation but nothing ruptured or torn her surgical    Physical exam:    General: No acute distress or breathing difficulty or discomfort, pleasant and cooperative with the examination.    Extremities: Pain persist base of the neck cervical spine up into the base of her jaw shoulder and elbow periscapular pain medial border of the scapula    She is motor intact C5-T1    She has limited head tilt and chin rotation with flexion extension abduction rotation    There is no obvious with winging of the scapula    Shoulder rotator cuff function is fairly good with good push pull she does have some overhead impingement type I and 2 injection sites around the shoulder are clean dry and intact with no ecchymosis bruising there is no gross instability there is no drop arm sign        Diagnostic studies: Imaging studies showed some mild cuff tendinitis but completely not responsive to anything involving shoulder injections    Impression: Cervical spine inflammation and nerve root impingement cervical radiculopathy    Plan: Now for C-spine evaluation EMG C-spine MRI and follow-up with our spine team for evaluation she is already tried therapy Bowman TENS unit traction all which gave her some relief but pain is returned despite our best effort multiple injections multiple rounds of therapy and PT treatment

## 2024-03-15 LAB — ACTH PLAS-MCNC: 22.8 PG/ML (ref 7.2–63.3)

## 2024-03-18 ENCOUNTER — HOSPITAL ENCOUNTER (OUTPATIENT)
Dept: RADIOLOGY | Facility: CLINIC | Age: 74
Discharge: HOME | End: 2024-03-18
Payer: MEDICARE

## 2024-03-18 ENCOUNTER — INFUSION (OUTPATIENT)
Dept: HEMATOLOGY/ONCOLOGY | Facility: CLINIC | Age: 74
End: 2024-03-18
Payer: MEDICARE

## 2024-03-18 DIAGNOSIS — C15.9 ADENOCARCINOMA OF ESOPHAGUS (MULTI): ICD-10-CM

## 2024-03-18 DIAGNOSIS — C15.9 ESOPHAGEAL ADENOCARCINOMA (MULTI): ICD-10-CM

## 2024-03-18 DIAGNOSIS — C16.0 ADENOCARCINOMA OF GASTROESOPHAGEAL JUNCTION (MULTI): ICD-10-CM

## 2024-03-18 PROCEDURE — 2550000001 HC RX 255 CONTRASTS: Performed by: INTERNAL MEDICINE

## 2024-03-18 PROCEDURE — 71260 CT THORAX DX C+: CPT

## 2024-03-18 PROCEDURE — 36591 DRAW BLOOD OFF VENOUS DEVICE: CPT

## 2024-03-18 PROCEDURE — 71260 CT THORAX DX C+: CPT | Performed by: STUDENT IN AN ORGANIZED HEALTH CARE EDUCATION/TRAINING PROGRAM

## 2024-03-18 PROCEDURE — 2500000004 HC RX 250 GENERAL PHARMACY W/ HCPCS (ALT 636 FOR OP/ED)

## 2024-03-18 RX ORDER — HEPARIN 100 UNIT/ML
500 SYRINGE INTRAVENOUS AS NEEDED
OUTPATIENT
Start: 2024-03-18

## 2024-03-18 RX ORDER — HEPARIN 100 UNIT/ML
500 SYRINGE INTRAVENOUS AS NEEDED
Status: DISCONTINUED | OUTPATIENT
Start: 2024-03-18 | End: 2024-03-18 | Stop reason: HOSPADM

## 2024-03-18 RX ORDER — HEPARIN SODIUM,PORCINE/PF 10 UNIT/ML
50 SYRINGE (ML) INTRAVENOUS AS NEEDED
OUTPATIENT
Start: 2024-03-18

## 2024-03-18 RX ADMIN — HEPARIN 500 UNITS: 100 SYRINGE at 11:12

## 2024-03-18 RX ADMIN — IOHEXOL 50 ML: 350 INJECTION, SOLUTION INTRAVENOUS at 11:07

## 2024-03-19 ENCOUNTER — OFFICE VISIT (OUTPATIENT)
Dept: ORTHOPEDIC SURGERY | Facility: CLINIC | Age: 74
End: 2024-03-19
Payer: MEDICARE

## 2024-03-19 ENCOUNTER — TELEMEDICINE (OUTPATIENT)
Dept: SURGERY | Facility: CLINIC | Age: 74
End: 2024-03-19
Payer: MEDICARE

## 2024-03-19 DIAGNOSIS — M54.12 CERVICAL RADICULOPATHY: Primary | ICD-10-CM

## 2024-03-19 DIAGNOSIS — C15.5 CANCER OF ABDOMINAL ESOPHAGUS (MULTI): Primary | ICD-10-CM

## 2024-03-19 PROCEDURE — 1159F MED LIST DOCD IN RCRD: CPT | Performed by: STUDENT IN AN ORGANIZED HEALTH CARE EDUCATION/TRAINING PROGRAM

## 2024-03-19 PROCEDURE — 1036F TOBACCO NON-USER: CPT | Performed by: PHYSICIAN ASSISTANT

## 2024-03-19 PROCEDURE — 1036F TOBACCO NON-USER: CPT | Performed by: STUDENT IN AN ORGANIZED HEALTH CARE EDUCATION/TRAINING PROGRAM

## 2024-03-19 PROCEDURE — 1157F ADVNC CARE PLAN IN RCRD: CPT | Performed by: STUDENT IN AN ORGANIZED HEALTH CARE EDUCATION/TRAINING PROGRAM

## 2024-03-19 PROCEDURE — 1125F AMNT PAIN NOTED PAIN PRSNT: CPT | Performed by: PHYSICIAN ASSISTANT

## 2024-03-19 PROCEDURE — 1157F ADVNC CARE PLAN IN RCRD: CPT | Performed by: PHYSICIAN ASSISTANT

## 2024-03-19 PROCEDURE — 99214 OFFICE O/P EST MOD 30 MIN: CPT | Performed by: PHYSICIAN ASSISTANT

## 2024-03-19 PROCEDURE — 99443 PR PHYS/QHP TELEPHONE EVALUATION 21-30 MIN: CPT | Performed by: STUDENT IN AN ORGANIZED HEALTH CARE EDUCATION/TRAINING PROGRAM

## 2024-03-19 PROCEDURE — 1159F MED LIST DOCD IN RCRD: CPT | Performed by: PHYSICIAN ASSISTANT

## 2024-03-19 RX ORDER — METHYLPREDNISOLONE 4 MG/1
TABLET ORAL
Qty: 1 EACH | Refills: 0 | Status: SHIPPED | OUTPATIENT
Start: 2024-03-19

## 2024-03-19 ASSESSMENT — PAIN SCALES - GENERAL: PAINLEVEL_OUTOF10: 8

## 2024-03-19 ASSESSMENT — PAIN - FUNCTIONAL ASSESSMENT: PAIN_FUNCTIONAL_ASSESSMENT: 0-10

## 2024-03-19 NOTE — PROGRESS NOTES
C/C:  Follow up visit    History Of Present Illness  Sarah Chavez is a 73 y.o. female presenting for follow up with surveillance imaging given her h/o esophageal cancer. 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 She is now s/p minimally invasive [place] Navdeep esophagectomy and jejunostomy tube placement on 7/20/2022. She has completed 1 year of adjuvant Nivolumab with Dr. Lau     Since our last visit she has been doing well, no acute issues. Denies regurgitation. She is eating well, no dysphagia. Has gained a couple lbs since last visit. She denies chest pain, shortness of breath, vomiting.   She took her annual trip to [place] recently and had a good vacation.      By way of review: patient was seen in January 2022 for odynophagia/dysphagia and GERD. She reports having intermittent heartburn, especially at night for the past 4-5 years but this became significantly worse at the end of last year around the time of the holidays. She had lost ~10 pounds since these symptoms started. She had minimal response to BID PPI therapy and she underwent an EGD in January with Dr. Lora which shoed a distal esophageal mass confirmed adenocarcinoma. She had a f/up EUS with Dr. Delgadillo showing at least a T3N0 disease. She is now s/p CROSS regiment neoadj chemoXRT which she finished 5/25. She had notable weight loss during her treatment but did not wish to pursue a feeding tube. Patient states she just started to feel better in terms of her appetite and energy level about 2 weeks ago. Weight has stabilized, no recent gain.      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 Of note she did develop a PE several months after this surgery        Past Medical History  She has a past medical history of Abdominal wall pain, Abnormal EKG, Bilateral kidney stones, History of mammogram (04/18/2023), and Thyrotoxicosis.    Social History  She reports that she quit  smoking about 11 years ago. Her smoking use included cigarettes. She has never been exposed to tobacco smoke. She has never used smokeless tobacco. She reports current alcohol use. She reports that she does not use drugs.      Medications    Current Outpatient Medications:     ALPRAZolam (Xanax) 0.25 mg tablet, Take 1 tablet (0.25 mg) by mouth as needed at bedtime for anxiety or sleep., Disp: 60 tablet, Rfl: 1    apple cider vinegar 300 mg tablet, TAKE AS DIRECTED., Disp: , Rfl:     ascorbic acid (Vitamin C) 1,000 mg tablet, Take 1 tablet (1,000 mg) by mouth 2 times a day., Disp: , Rfl:     b complex vitamins capsule, Take 1 capsule by mouth once daily., Disp: , Rfl:     BACILLUS COAGULANS-INULIN ORAL, Take 1 tablet by mouth 1 (one) time each day., Disp: , Rfl:     cholecalciferol (Vitamin D-3) 125 MCG (5000 UT) capsule, Take 1 capsule (125 mcg) by mouth once daily., Disp: , Rfl:     echinacea 400 mg capsule, Take 1 capsule by mouth 1 (one) time each day., Disp: , Rfl:     ferrous sulfate 325 (65 Fe) MG tablet, Take 1 tablet by mouth once daily with breakfast., Disp: , Rfl:     fish oil concentrate (Omega-3) 120-180 mg capsule, Take 1 capsule (1 g) by mouth once daily., Disp: , Rfl:     ketoconazole (NIZOral) 2 % cream, APPLY SPARINGLY TO AFFECTED AREA(S) ONCE DAILY, Disp: , Rfl:     magnesium 250 mg tablet, Take 1 tablet (250 mg) by mouth once daily., Disp: , Rfl:     melatonin 5 mg capsule, Take 1 capsule (5 mg) by mouth once daily at bedtime., Disp: , Rfl:     methylPREDNISolone (Medrol Dospak) 4 mg tablets, Follow schedule on package instructions, Disp: 1 each, Rfl: 0    omeprazole (PriLOSEC) 40 mg DR capsule, Take 1 capsule (40 mg) by mouth once daily., Disp: , Rfl:     potassium gluconate 595 mg (99 mg) tablet extended release, Take 1 tablet by mouth 1 (one) time each day., Disp: , Rfl:     psyllium (Metamucil) 0.4 gram capsule, Take as directed, Disp: , Rfl:     rosuvastatin (Crestor) 5 mg tablet, Take 1  tablet (5 mg) by mouth once daily., Disp: 90 tablet, Rfl: 3    sertraline (Zoloft) 100 mg tablet, TAKE 1 TABLET BY MOUTH EVERY DAY, Disp: 30 tablet, Rfl: 5    traMADol (Ultram) 50 mg tablet, Take 1 tablet (50 mg) by mouth 4 times a day as needed., Disp: , Rfl:     turmeric root extract 500 mg tablet, Take 1 tablet by mouth 1 (one) time each day., Disp: , Rfl:   No current facility-administered medications for this visit.    Allergies  Statins-hmg-coa reductase inhibitors    Review of Systems:  Review of Systems   Constitutional: No fevers, chills, unexpected weight change  HENT: No sore throat, congestion, or nasal drainage  Eyes: No visual changes or eye itching  Respiratory:  No cough, worsening dyspnea, wheezing  Cardiac: No chest pain, palpitations, or lower extremity edema  Gastrointestinal: No nausea, vomiting, diarrhea. No abdominal pain  Genitourinary: No dysuria or hematuria  Musculoskeletal: No back pain. No significant myalgias or arthralgias  Neurologic: No headaches, dizziness, or seizures.  Hematologic: No east bleeding or bruising.  Psychiatric: No anxiety or depression.    Physical Exam:  Physical Exam  LMP 01/01/1987 (Approximate)   Vital signs stable  Psychiatric:         Mood and Affect: Mood normal.       Relevant Results:     Pathology:  Accession #: [id number]            Pathologist:                   KRISHNA MEEK M.D.  Date of Procedure:    7/20/2022  Date Received:          7/20/2022  Date Reported           8/5/2022  Submitting Physician:   SAGAR DOWNS DO  [facility]  Other External #                                                                   FINAL DIAGNOSIS  A. DISTAL ESOPHAGUS AND PROXIMAL STOMACH, ESOPHAGOGASTRECTOMY:  -- RESIDUAL MICROSCOPIC FOCI OF POORLY DIFFERENTIATED ADENOCARCINOMA (LARGEST  FOCUS: 0.5 CM) OF THE ESOPHAGOGASTRIC JUNCTION, INVADING INTO THE MUSCULARIS  PROPRIA.  -- STATUS POST NEOADJUVANT THERAPY WITH PARTIAL TREATMENT RESPONSE  (CAP TUMOR  REGRESSION SCORE: 2).  -- FOCAL LYMPHOVASCULAR INVASION IS IDENTIFIED.  -- PERINEURAL INVASION IS NOT IDENTIFIED.  -- ALL SURGICAL RESECTION MARGINS ARE NEGATIVE.  -- METASTATIC CARCINOMA IN ONE OF FIFTEEN LYMPH NODES (1/15).  -- PATHOLOGIC STAGE (AJCC 8TH EDITION): ypT2 N1 (see synoptic report).  -- STOMACH WITH MILD CHRONIC INACTIVE GASTRITIS; NO HELICOBACTER IS SEEN.    B. STOMACH, ADDITIONAL MARGINS, EXCISION:  -- FULL THICKNESS SEGMENT OF STOMACH WITH NO SIGNIFICANT ABNORMALITY.  -- NEGATIVE FOR TUMOR.    C. ESOPHAGUS AND STOMACH, ANASTOMOTIC DONUTS, EXCISION:    -- FULL THICKNESS SEGMENTS OF ESOPHAGUS AND STOMACH WITH NO SIGNIFICANT  ABNORMALITY.  -- NEGATIVE FOR TUMOR.    Comment: Immunohistochemical stains performed on block A17 demonstrate that the  tumor cells are focally positive for CDX2. Chromogranin is negative, while  INSM1 shows focal non-specific staining. RB1 shows preserved nuclear  expression. Mucicarmine shows no definite intracytoplasmic mucin in tumor  cells.                                                                                                                                             CASE SUMMARY REPORT       SPECIMEN  Procedure:      Esophagogastrectomy  TUMOR  Tumor Site:      Esophagogastric junction (EGJ)  Relationship of Tumor to Esophagogastric Junction:       Tumor midpoint is located at the esophagogastric junction  Histologic Type:      Adenocarcinoma  Histologic Grade:      G3, poorly differentiated, undifferentiated  Tumor Size:      Greatest Dimension (Centimeters): 0.5 cm  Tumor Extent:      Invades muscularis propria  Treatment Effect:      Present, with residual cancer showing evident tumor  regression, but more than single cells or rare small groups of cancer cells  (partial response, score 2)  Lymphovascular Invasion:      Present  Perineural Invasion:      Not identified  MARGINS  Margin Status for Invasive Carcinoma:      All margins negative for  invasive  carcinoma   Closest Margin(s) to Invasive Carcinoma:        Proximal   Distance from Invasive Carcinoma to Closest Margin  :          4.9 cm  Margin Status for Dysplasia and Intestinal Metaplasia:       All margins negative for dysplasia  REGIONAL LYMPH NODES  Regional Lymph Node Status:        Tumor present in regional lymph node(s)     Number of Lymph Nodes with Tumor:           1   Number of Lymph Nodes Examined:         15  PATHOLOGIC STAGE CLASSIFICATION (pTNM, AJCC 8th Edition)  TNM Descriptors:      y (post-treatment)  pT Category:      pT2  pN Category:      pN1     Imaging:  CT Chest was personally reviewed     Assessment/Plan   There are no diagnoses linked to this encounter.         Sraah Chavez is a 73 y.o. female with distal esophageal adenocarcinoma s/p CROSS regimen, completed 5/25 and s/p minimally invasive [place] Navdeep esophagectomy and jejunostomy tube placement on 7/20/2022. Her pathology did show ypT2N1 disease (negative margins, small residual tumor, 1/15 lymph nodes involved) and she completed 1 year adjuvant Nivolumab with Dr. Lau. Prior CT chest showed a new small effusion/pleural thickening. CT C/A/P today shows no concerning findings/generally stable. Will plan for repeat scan in 6 months if Dr. Lau agrees and VV at that time.       Sylvia Poon, DO  Thoracic & Esophageal Surgery

## 2024-03-19 NOTE — PROGRESS NOTES
New patient to us established to the practice comes the office complaining of right-sided neck pain into the trap down to the shoulder she will get some numbness and tingling to that area.  But nothing down the arm.  No bowel or bladder complaints, no saddle anesthesia no gait or balance changes no trauma accident or fall to cause it.  Denies any spine surgery.  She had an injection in her shoulder with Dr. Maravilla did not do anything for relief at all.  She is scheduled for an EMG nerve conduction study and a cervical MRI    We checked patient's recent blood work.  Hemoglobin A1c was 5.5 last 1.  It was 3 months ago checked a metabolic panel glucose 99 sodium 142 potassium 3.8.  Checked primary doctors note medication list she is on a statin medication which sometimes can cause muscular aches and pain    Physical exam: Well-nourished, well kept.  No lymphangitis or lymphadenopathy in the examined extremities.  Good perfusion to the extremities ×4.  Radial and dorsalis pedis pulses 2+.  Capillary refill to all 4 digits brisk.  No distal edema x 4.  Gait normal.  Can walk on heels and toes.  Decreased range of motion flexion-extension rotation cervical spine tender in the right cervical spinal musculature good strength no instability.  Examination of the upper extremities reveals no point tenderness, swelling, or deformity.  Range of motion of the shoulders, elbows, wrists, and fingers are all full without crepitance, instability, or exacerbation of pain.  Strength is 5/5 throughout.  Examination of the back reveals no swelling, step-off, or point tenderness.  Range of motion with flexion, extension, side bending and rotation is well maintained without crepitance, instability, or exacerbation of pain.  Strength is within normal limits.  Examination of the lower extremities reveals no point tenderness, swelling, or deformity.  Range of motion of the hips, knees, and ankles are full without crepitance, instability, or  exacerbation of pain.  Strength is 5/5 throughout.  No redness, abrasions, or lesions on all 4 extremities, head and neck, or trunk.  Gross sensation intact in the extremities ×4.  Deep tendon reflexes 2+ and symmetric bilaterally.  Chung, clonus, and Babinski were negative.  Straight leg raise negative.  Affect normal.  Alert and oriented ×3.  Coordination normal.    X-ray: X-rays cervical spine taken today and reviewed show severe arthritic degenerative changes cervical 5-6 cervical 6-7 no fractures dislocations or bony lytic lesions.    Assessment: This a patient I think she has more of a cervical radiculopathy going on causing her symptoms.  She is getting a nerve conduction test and an MRI soon.    Plan: Patient will continue to get those tests and exams will follow-up with her after those we will try little oral steroid see if it calms her symptoms down for now.  We also ordered some physical therapy for the cervical spine

## 2024-03-20 NOTE — PROGRESS NOTES
Patient ID: Sarah Chavez is a 73 y.o. female.    Subjective    HPI  Ms. 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 Currently on adjuvant nivolumab.     Most recent blood work shows CBC and CMP are unremarkable. Mild elevation in AST. CT chest on 3/18/2024 shows no evidence of recurrence or metastatic disease.     Since last visit, patient reports that overall she has felt well with good energy and good appetite. States she has been put on prednisone for 5 days which is helping her appetite. Denies any new infections, fevers or chills. Denies any new chest pain, cough or shortness of breath. She reports some mild lower extremity edema which is not new but denies any new lumps or bumps or rashes. No other complaints today.     Patient's past medical history, surgical history, family history and social history reviewed.     Objective    Vitals:    03/21/24 1022   BP: 143/79   Pulse: 75   Resp: 16   Temp: 37.6 °C (99.7 °F)   SpO2: 94%      LMP 01/01/1987 (Approximate)     Review of Systems:   Review of Systems:    Positive per HPI, otherwise negative.      Physical Exam    Performance Status:  Symptomatic; fully ambulatory    Labs/Imaging/Pathology: personally reviewed reports and images in Epic electronic medical record system. Pertinent results as it related to the plan represented in below in assessment and plan.      Assessment/Plan   1. GE junction adenocarcinoma, ypT2N1  - Initially presented to GI  1/10/2022 with epigastric pain and dysphasia, weight loss. Patient  underwent an EGD on 1/21/2022 with Dr. Lora and was found to have a malignant esophageal tumor at the GE junction . An upper EUS with Dr. Delgadillo  on 2/3/2022 found to have a malignant tumor in the lower esophagus, at the GE  junction and extending into the cardia. Pathology returned as adenocarcinoma stage T3N0 by endosonographic criteria.   - 3/2/2022 - Started  FOLFOX, completed 3 cycles, C2 onward 80% paclitaxel due to neuropathy.  - 4/8/2022 - PET/CT per [facility] 56141 with great response.   - 4/18/2022 - Started on RT with FOLFOX on 4/19/2022.  - C5 skipped due to thrombocytopenia.  - 5/11/2022 - Last cycle of FOLFOX.  - 6/27/2022 - Post CRT PET with good response.  - 7/20/2022 Status-post esophagectomy, final pathology revealed an 0.5 cm residual foci of poorly differentiated adenocarcinoma along with 1/15 lymph nodes with metastatic  carcinoma. Final stage ypT2 N1.  - Discussed the role of adjuvant nivolumab per Checkmate 577 which showed an increase in disease-free survival when nivolumab was given every 2 weeks for 8 treatments followed by every 4 weeks to complete the year.   - Reviewed nivolumab and side effects. Patient was agreeable to treatment and consent signed.   - 8/2/23 completed one year of adjuvant nivolumab     8/2/23:  - No contraindications to proceed with last treatment of nivolumab today. She has completed 1 year of treatment.   - We discussed that she already has her next scans scheduled through Surgery.  - We discussed that she would like to keep her port in due to having poor IV access.  - We will plan for 6-week port flush and follow-up with me in 12 weeks.   - She will call us with any worsening symptoms.   - Her major compliant continues to be poor appetite and trouble eating. We will have repeat labs with immunotherapy in 6 weeks and I will see her again in 12 weeks.   - RTC with me in 12 weeks.      11/8/23:  - We will plan for a port flush today and blood work.   - She is planned for an ultrasound and follow-up with Dr. Poon tomorrow.  - She did have a new rash on her toes concerning for bullous pemphigoid versus a hypersensitivity that resolved with steroids. We discussed if those symptoms come back to call us and we can get her in with Dermatology   as she may need steroids for a longer period of time.  - We will plan to check an ACTH  and TSH with labs today. If unremarkable, we will plan for follow-up in 3 months with a CT chest prior along with CBC, CMP, ACTH and TSH.   - We will plan for another Signatera in 3 months.   - We will plan for a port flush in 8 weeks.  - Due to traveling her follow up and port flushes will be stretched out to allow for travel to [place] in February 2024.   - RTC with me in March 2024.      3/21/24:  - Reviewed most recent blood work and CT scans. No evidence of recurrence.   - At this time, she would like to have her port removed which we will help arrange.   - Otherwise, we will plan for follow-up in 3 months with labs and likely plan for imaging in 6 months with CT chest.   - RTC with Hector in 3 months and with me in 6 months.     RTC with Hector in 3 months and with me in 6 months. This note has been transcribed using a medical scribe and there is a possibility of unintentional typing misprints.      Diagnoses and all orders for this visit:  Adenocarcinoma of esophagus (CMS/HCC)  -     Clinic Appointment Request  -     IR CVC port removal; Future  -     Clinic Appointment Request; Future  -     Oncology Line Draw Appointment Request; Future  -     CBC and Auto Differential; Future  -     Comprehensive metabolic panel; Future  -     signatera; Other-indicate in comment; unknown - Miscellaneous Test; Future  Adenocarcinoma of gastroesophageal junction (CMS/HCC)  -     Clinic Appointment Request  -     IR CVC port removal; Future    Renay Lau MD  Hematology/Oncology  [facility]    Scribe Attestation  By signing my name below, Connie ROMERO Scribe attest that this documentation has been prepared under the direction and in the presence of Renay Lau MD.

## 2024-03-21 ENCOUNTER — OFFICE VISIT (OUTPATIENT)
Dept: HEMATOLOGY/ONCOLOGY | Facility: CLINIC | Age: 74
End: 2024-03-21
Payer: MEDICARE

## 2024-03-21 VITALS
BODY MASS INDEX: 21.85 KG/M2 | SYSTOLIC BLOOD PRESSURE: 143 MMHG | OXYGEN SATURATION: 94 % | WEIGHT: 119.05 LBS | DIASTOLIC BLOOD PRESSURE: 79 MMHG | HEART RATE: 75 BPM | RESPIRATION RATE: 16 BRPM | TEMPERATURE: 99.7 F

## 2024-03-21 DIAGNOSIS — C16.0 ADENOCARCINOMA OF GASTROESOPHAGEAL JUNCTION (MULTI): ICD-10-CM

## 2024-03-21 DIAGNOSIS — C15.9 ADENOCARCINOMA OF ESOPHAGUS (MULTI): ICD-10-CM

## 2024-03-21 PROCEDURE — 1126F AMNT PAIN NOTED NONE PRSNT: CPT | Performed by: INTERNAL MEDICINE

## 2024-03-21 PROCEDURE — 1159F MED LIST DOCD IN RCRD: CPT | Performed by: INTERNAL MEDICINE

## 2024-03-21 PROCEDURE — 1036F TOBACCO NON-USER: CPT | Performed by: INTERNAL MEDICINE

## 2024-03-21 PROCEDURE — 99214 OFFICE O/P EST MOD 30 MIN: CPT | Performed by: INTERNAL MEDICINE

## 2024-03-21 PROCEDURE — 1157F ADVNC CARE PLAN IN RCRD: CPT | Performed by: INTERNAL MEDICINE

## 2024-03-21 ASSESSMENT — PAIN SCALES - GENERAL: PAINLEVEL: 0-NO PAIN

## 2024-03-26 ENCOUNTER — APPOINTMENT (OUTPATIENT)
Dept: SURGERY | Facility: CLINIC | Age: 74
End: 2024-03-26
Payer: MEDICARE

## 2024-04-02 ENCOUNTER — APPOINTMENT (OUTPATIENT)
Dept: SURGERY | Facility: CLINIC | Age: 74
End: 2024-04-02
Payer: MEDICARE

## 2024-04-03 ENCOUNTER — HOSPITAL ENCOUNTER (OUTPATIENT)
Dept: RADIOLOGY | Facility: CLINIC | Age: 74
Discharge: HOME | End: 2024-04-03
Payer: MEDICARE

## 2024-04-03 DIAGNOSIS — M54.12 CERVICAL RADICULOPATHY: ICD-10-CM

## 2024-04-03 PROCEDURE — 72141 MRI NECK SPINE W/O DYE: CPT | Performed by: RADIOLOGY

## 2024-04-03 PROCEDURE — 72141 MRI NECK SPINE W/O DYE: CPT

## 2024-04-15 ENCOUNTER — HOSPITAL ENCOUNTER (OUTPATIENT)
Dept: RADIOLOGY | Facility: HOSPITAL | Age: 74
Discharge: HOME | End: 2024-04-15
Payer: MEDICARE

## 2024-04-15 VITALS
HEART RATE: 67 BPM | DIASTOLIC BLOOD PRESSURE: 58 MMHG | SYSTOLIC BLOOD PRESSURE: 112 MMHG | RESPIRATION RATE: 16 BRPM | HEIGHT: 62 IN | BODY MASS INDEX: 20.98 KG/M2 | OXYGEN SATURATION: 98 % | TEMPERATURE: 97.5 F | WEIGHT: 114 LBS

## 2024-04-15 DIAGNOSIS — C15.9 ADENOCARCINOMA OF ESOPHAGUS (MULTI): ICD-10-CM

## 2024-04-15 DIAGNOSIS — C16.0 ADENOCARCINOMA OF GASTROESOPHAGEAL JUNCTION (MULTI): ICD-10-CM

## 2024-04-15 LAB
ERYTHROCYTE [DISTWIDTH] IN BLOOD BY AUTOMATED COUNT: 13.9 % (ref 11.5–14.5)
HCT VFR BLD AUTO: 44.2 % (ref 36–46)
HGB BLD-MCNC: 14.4 G/DL (ref 12–16)
INR PPP: 0.9 (ref 0.9–1.1)
MCH RBC QN AUTO: 33.3 PG (ref 26–34)
MCHC RBC AUTO-ENTMCNC: 32.6 G/DL (ref 32–36)
MCV RBC AUTO: 102 FL (ref 80–100)
NRBC BLD-RTO: 0 /100 WBCS (ref 0–0)
PLATELET # BLD AUTO: 238 X10*3/UL (ref 150–450)
PROTHROMBIN TIME: 10.4 SECONDS (ref 9.8–12.8)
RBC # BLD AUTO: 4.32 X10*6/UL (ref 4–5.2)
WBC # BLD AUTO: 5.8 X10*3/UL (ref 4.4–11.3)

## 2024-04-15 PROCEDURE — 85027 COMPLETE CBC AUTOMATED: CPT | Performed by: RADIOLOGY

## 2024-04-15 PROCEDURE — 36590 REMOVAL TUNNELED CV CATH: CPT | Mod: RT

## 2024-04-15 PROCEDURE — 2720000007 HC OR 272 NO HCPCS

## 2024-04-15 PROCEDURE — 85610 PROTHROMBIN TIME: CPT | Performed by: RADIOLOGY

## 2024-04-15 PROCEDURE — 99152 MOD SED SAME PHYS/QHP 5/>YRS: CPT | Performed by: RADIOLOGY

## 2024-04-15 PROCEDURE — 7100000001 HC RECOVERY ROOM TIME - INITIAL BASE CHARGE

## 2024-04-15 PROCEDURE — 7100000002 HC RECOVERY ROOM TIME - EACH INCREMENTAL 1 MINUTE

## 2024-04-15 PROCEDURE — 2500000004 HC RX 250 GENERAL PHARMACY W/ HCPCS (ALT 636 FOR OP/ED): Performed by: RADIOLOGY

## 2024-04-15 PROCEDURE — 2500000005 HC RX 250 GENERAL PHARMACY W/O HCPCS: Performed by: RADIOLOGY

## 2024-04-15 PROCEDURE — 99152 MOD SED SAME PHYS/QHP 5/>YRS: CPT

## 2024-04-15 PROCEDURE — 77001 FLUOROGUIDE FOR VEIN DEVICE: CPT | Mod: RT

## 2024-04-15 RX ORDER — MIDAZOLAM HYDROCHLORIDE 1 MG/ML
INJECTION, SOLUTION INTRAMUSCULAR; INTRAVENOUS
Status: COMPLETED | OUTPATIENT
Start: 2024-04-15 | End: 2024-04-15

## 2024-04-15 RX ORDER — FENTANYL CITRATE 50 UG/ML
INJECTION, SOLUTION INTRAMUSCULAR; INTRAVENOUS
Status: COMPLETED | OUTPATIENT
Start: 2024-04-15 | End: 2024-04-15

## 2024-04-15 RX ORDER — LIDOCAINE HYDROCHLORIDE 10 MG/ML
INJECTION, SOLUTION EPIDURAL; INFILTRATION; INTRACAUDAL; PERINEURAL
Status: COMPLETED | OUTPATIENT
Start: 2024-04-15 | End: 2024-04-15

## 2024-04-15 RX ORDER — SODIUM CHLORIDE 9 MG/ML
50 INJECTION, SOLUTION INTRAVENOUS CONTINUOUS
Status: DISCONTINUED | OUTPATIENT
Start: 2024-04-15 | End: 2024-04-16 | Stop reason: HOSPADM

## 2024-04-15 RX ADMIN — FENTANYL CITRATE 50 MCG: 50 INJECTION, SOLUTION INTRAMUSCULAR; INTRAVENOUS at 10:46

## 2024-04-15 RX ADMIN — MIDAZOLAM 2 MG: 1 INJECTION INTRAMUSCULAR; INTRAVENOUS at 10:46

## 2024-04-15 RX ADMIN — LIDOCAINE HYDROCHLORIDE 10 ML: 10 INJECTION, SOLUTION EPIDURAL; INFILTRATION; INTRACAUDAL; PERINEURAL at 10:52

## 2024-04-15 ASSESSMENT — PAIN SCALES - GENERAL
PAINLEVEL_OUTOF10: 0 - NO PAIN

## 2024-04-15 ASSESSMENT — PAIN - FUNCTIONAL ASSESSMENT
PAIN_FUNCTIONAL_ASSESSMENT: 0-10

## 2024-04-15 NOTE — PRE-PROCEDURE NOTE
Interventional Radiology Preprocedure Note    Indication for procedure: Diagnoses of Adenocarcinoma of esophagus (Multi) and Adenocarcinoma of gastroesophageal junction (Multi) were pertinent to this visit. For port removal    Relevant review of systems: NA    Relevant Labs:   Lab Results   Component Value Date    CREATININE 0.77 03/13/2024    EGFR 82 03/13/2024    INR 0.9 04/15/2024    PROTIME 10.4 04/15/2024       Planned Sedation/Anesthesia: Moderate    Airway assessment: normal    Directed physical examination:    Awake and alert, oriented  No acute distress  Regular rate, rhythm  Breathing non-labored    Mallampati: I (soft palate, uvula, fauces, and tonsillar pillars visible)    ASA Score: ASA 2 - Patient with mild systemic disease with no functional limitations    Benefits, risks and alternatives of procedure and planned sedation have been discussed with the patient and/or their representative. All questions answered and they agree to proceed.

## 2024-04-15 NOTE — POST-PROCEDURE NOTE
Interventional Radiology Brief Postprocedure Note    Attending: Don Puri MD    Assistant: none    Diagnosis: Esophageal CA    Description of procedure: Successful removal of right chest mediport     Anesthesia:  MAC    Complications: None    Estimated Blood Loss: none    Medications (Filter: Administrations occurring from 1022 to 1108 on 04/15/24) As of 04/15/24 1108      midazolam (Versed) injection (mg) Total dose:  2 mg      Date/Time Rate/Dose/Volume Action       04/15/24  1046 2 mg Given               fentaNYL PF (Sublimaze) injection (mcg) Total dose:  50 mcg      Date/Time Rate/Dose/Volume Action       04/15/24  1046 50 mcg Given               lidocaine PF (Xylocaine) 10 mg/mL (1 %) injection (mL) Total volume:  10 mL      Date/Time Rate/Dose/Volume Action       04/15/24  1052 10 mL Given                   No specimens collected      See detailed result report with images in PACS.    The patient tolerated the procedure well without incident or complication and is in stable condition.

## 2024-04-15 NOTE — Clinical Note
Right chest mediport removed. Incision internally sutured and skin approximated with dermabond. Pt tolerated procedure well.

## 2024-04-16 ENCOUNTER — APPOINTMENT (OUTPATIENT)
Dept: NEUROLOGY | Facility: HOSPITAL | Age: 74
End: 2024-04-16
Payer: MEDICARE

## 2024-04-25 ENCOUNTER — APPOINTMENT (OUTPATIENT)
Dept: NEUROLOGY | Facility: HOSPITAL | Age: 74
End: 2024-04-25
Payer: MEDICARE

## 2024-05-16 ENCOUNTER — OUTSIDE SERVICES (OUTPATIENT)
Dept: NEUROLOGY | Age: 74
End: 2024-05-16
Payer: MEDICARE

## 2024-05-16 ENCOUNTER — HOSPITAL ENCOUNTER (OUTPATIENT)
Dept: NEUROLOGY | Facility: HOSPITAL | Age: 74
Discharge: HOME | End: 2024-05-16
Payer: MEDICARE

## 2024-05-16 DIAGNOSIS — M54.12 CERVICAL RADICULOPATHY: ICD-10-CM

## 2024-05-16 DIAGNOSIS — G56.03 BILATERAL CARPAL TUNNEL SYNDROME: Primary | ICD-10-CM

## 2024-05-16 PROCEDURE — 95886 MUSC TEST DONE W/N TEST COMP: CPT | Performed by: PSYCHIATRY & NEUROLOGY

## 2024-05-16 PROCEDURE — 95913 NRV CNDJ TEST 13/> STUDIES: CPT

## 2024-05-16 PROCEDURE — 95913 NRV CNDJ TEST 13/> STUDIES: CPT | Performed by: PSYCHIATRY & NEUROLOGY

## 2024-05-21 ENCOUNTER — OFFICE VISIT (OUTPATIENT)
Dept: ORTHOPEDIC SURGERY | Facility: CLINIC | Age: 74
End: 2024-05-21
Payer: MEDICARE

## 2024-05-21 DIAGNOSIS — M54.12 CERVICAL RADICULOPATHY: Primary | ICD-10-CM

## 2024-05-21 PROCEDURE — 99213 OFFICE O/P EST LOW 20 MIN: CPT | Performed by: PHYSICIAN ASSISTANT

## 2024-05-21 PROCEDURE — 1159F MED LIST DOCD IN RCRD: CPT | Performed by: PHYSICIAN ASSISTANT

## 2024-05-21 PROCEDURE — 1157F ADVNC CARE PLAN IN RCRD: CPT | Performed by: PHYSICIAN ASSISTANT

## 2024-05-21 NOTE — PROGRESS NOTES
Established patient to the practice follow-up cervical MRI.  Patient was having a right-sided shoulder and neck type symptoms.  It was basically stayed in the upper part.  She had some numbness and tingling.  This numbness and tingling went up to her kind of left side of her neck and her face.  I explained to her that numbness and tingling going up into the feet is not coming from her cervical spine.  She denies bowel or bladder complaints saddle anesthesia gait or balance changes.  No trauma accidents or falls.  No spine surgeries.  She also had an EMG nerve conduction study done.    Physical exam: Well-nourished, well kept.  No lymphangitis or lymphadenopathy in the examined extremities.  Good perfusion to the upper extremities bilaterally.  Radial pulses 2+.  Capillary refill to the digits brisk.  No distal edema.  Gait normal.  Can walk on heels and toes.  Examination of the neck reveals no swelling, step-off, or point tenderness.  Range of motion with flexion, extension, side bending and rotation is well maintained without crepitance, instability, or exacerbation of pain.  Strength is within normal limits.  Examination of the upper extremities reveals no point tenderness, swelling, or deformity.  Range of motion of the shoulders, elbows, wrists, and fingers are all full without crepitance, instability, or exacerbation of pain.  Strength is 5/5 throughout.  No redness, or lesions on the upper extremities bilaterally.  She has brisk reflexes bilateral biceps.  Otherwise gross sensation intact in the upper extremities bilaterally.  Affect normal.    MRI MRI cervical spine was reviewed and report was reviewed as well showing degenerative changes diffusely in the lumbar spine.  There are some bilateral foraminal narrowing at cervical 3 4.  Which is a moderate cervical 4 5 little central bulge but no foraminal stenosis cervical 5 6 shows some left-sided foraminal narrowing.  Which is mild to moderate.  Cervical 6 7  shows some moderate foraminal narrowing on the right.  No signs of any cord signal changes or myelomalacia.    EMG was reviewed and report was reviewed as well showing moderate carpal tunnel on the right.  Showed a little 5 6 irritation on the right but no active denervation.  Also showed suspicious for brachial plexopathy.    Assessment: This a patient was having some right-sided numbness and tingling and radicular symptoms but nothing past the shoulder.  She has some issues of bulging disc on cervical MRI but she is I do not think they are clinically significant she not really having symptoms in those distribution.  She is not really having carpal tunnel physical findings also.    Plan: I will get her to see our hand upper extremities specialist to see if they have any opinions on what is going on but I do not think this is a cervical issue or needs a surgical surgery.

## 2024-05-29 ENCOUNTER — OFFICE VISIT (OUTPATIENT)
Dept: ORTHOPEDIC SURGERY | Facility: CLINIC | Age: 74
End: 2024-05-29
Payer: MEDICARE

## 2024-05-29 ENCOUNTER — HOSPITAL ENCOUNTER (OUTPATIENT)
Dept: RADIOLOGY | Facility: HOSPITAL | Age: 74
Discharge: HOME | End: 2024-05-29
Payer: MEDICARE

## 2024-05-29 DIAGNOSIS — S80.10XA CONTUSION OF LOWER LEG, INITIAL ENCOUNTER: ICD-10-CM

## 2024-05-29 DIAGNOSIS — T14.8XXA CONTUSION OF BONE: Primary | ICD-10-CM

## 2024-05-29 DIAGNOSIS — T14.8XXA CONTUSION OF SOFT TISSUE: ICD-10-CM

## 2024-05-29 DIAGNOSIS — M79.661 PAIN IN RIGHT SHIN: ICD-10-CM

## 2024-05-29 PROCEDURE — 99214 OFFICE O/P EST MOD 30 MIN: CPT | Performed by: STUDENT IN AN ORGANIZED HEALTH CARE EDUCATION/TRAINING PROGRAM

## 2024-05-29 PROCEDURE — 1159F MED LIST DOCD IN RCRD: CPT | Performed by: STUDENT IN AN ORGANIZED HEALTH CARE EDUCATION/TRAINING PROGRAM

## 2024-05-29 PROCEDURE — 73590 X-RAY EXAM OF LOWER LEG: CPT | Mod: RT

## 2024-05-29 PROCEDURE — 1157F ADVNC CARE PLAN IN RCRD: CPT | Performed by: STUDENT IN AN ORGANIZED HEALTH CARE EDUCATION/TRAINING PROGRAM

## 2024-05-29 PROCEDURE — 73590 X-RAY EXAM OF LOWER LEG: CPT | Mod: RIGHT SIDE | Performed by: RADIOLOGY

## 2024-05-29 RX ORDER — MELOXICAM 15 MG/1
15 TABLET ORAL DAILY
Qty: 14 TABLET | Refills: 0 | Status: SHIPPED | OUTPATIENT
Start: 2024-05-29 | End: 2024-06-12

## 2024-05-29 NOTE — PROGRESS NOTES
Acute Injury Established Patient Visit    CC:   Chief Complaint   Patient presents with    Right Leg - Pain     Rt shin injury  Xrays today  Ran into  wound 5/5/24       HPI: Sarah is a 73 y.o.female who presents today with new complaints of right shin injury that occurred about 4 weeks ago.  She states that she ran into the .  She states that she feels as though this is not getting better.  She is not having any pain with walking.  She has a wound that is healing, but is also painful when water runs over it.  She has some bruising over the anterior shin.  No ankle pain either.    Assessment:   Problem List Items Addressed This Visit    None  Visit Diagnoses       Contusion of bone    -  Primary    Relevant Medications    meloxicam (Mobic) 15 mg tablet    Pain in right shin        Relevant Medications    meloxicam (Mobic) 15 mg tablet    Other Relevant Orders    XR tibia fibula right 2 views    Contusion of lower leg, initial encounter        Relevant Medications    meloxicam (Mobic) 15 mg tablet    Contusion of soft tissue        Relevant Medications    meloxicam (Mobic) 15 mg tablet             Plan: For this soft tissue and bony contusion of the right shin, we will place her in an Ace wrap and start an anti-inflammatory, meloxicam for the next 2 weeks.  Discussed with her that these types of injuries can take about 6 to 8 weeks to fully feel better.  Follow-up as needed.    Physical Exam:  GENERAL:  No obvious acute distress or medical instability.  NEURO:  Distally neurovascularly intact.  Sensation intact to light touch.  Extremity: Exam of the right leg reveals skin has a minor abrasion that looks to be healing with no signs of infection.  There is also bruising over the anterior shin at the mid tibial level.  She is nontender to palpation throughout the fibula.  She has no tenderness at the knee or the ankle.  She is walking without any pain.  She is able to walk on her toes and her  heels.  She has mild tenderness over the bruise, but no other tenderness over the tibia.    Diagnostics: X-rays of the right tibia and fibula reveal no acute fracture or dislocation.      Procedure: None  Procedures    Past Medical History  Past Medical History:   Diagnosis Date    Abdominal wall pain     Abnormal EKG     Bilateral kidney stones     History of mammogram 04/18/2023    cat 2    Thyrotoxicosis        Medication review  Medication Documentation Review Audit       Reviewed by Gisela Joseph RN (Registered Nurse) on 04/15/24 at 0845      Medication Order Taking? Sig Documenting Provider Last Dose Status   ALPRAZolam (Xanax) 0.25 mg tablet [id number] Yes Take 1 tablet (0.25 mg) by mouth as needed at bedtime for anxiety or sleep. Sima Miner MD 4/14/2024 Active   apple cider vinegar 300 mg tablet [id number] Yes TAKE AS DIRECTED. Adrian Willett MD Past Week Active   ascorbic acid (Vitamin C) 1,000 mg tablet [id number] Yes Take 1 tablet (1,000 mg) by mouth 2 times a day. Adrian Willett MD Past Week Active   b complex vitamins capsule [id number] Yes Take 1 capsule by mouth once daily. Adrian Willett MD Past Week Active   BACILLUS COAGULANS-INULIN ORAL [id number] Yes Take 1 tablet by mouth 1 (one) time each day. Adrian Willett MD Past Week Active   cholecalciferol (Vitamin D-3) 125 MCG (5000 UT) capsule [id number] Yes Take 1 capsule (125 mcg) by mouth once daily. Adrian Willett MD Past Week Active   echinacea 400 mg capsule [id number] Yes Take 1 capsule by mouth 1 (one) time each day. Adrian Willett MD Past Week Active   ferrous sulfate 325 (65 Fe) MG tablet [id number] Yes Take 1 tablet by mouth once daily with breakfast. Adrian Willett MD Past Week Active   fish oil concentrate (Omega-3) 120-180 mg capsule [id number] Yes Take 1 capsule (1 g) by mouth once daily. Adrian Willett MD Past Week Active   ketoconazole (NIZOral) 2 % cream [id number] Yes APPLY SPARINGLY TO AFFECTED  AREA(S) ONCE DAILY Adrian Willett MD Past Week Active   magnesium 250 mg tablet [id number] Yes Take 1 tablet (250 mg) by mouth once daily. Adrian Willett MD Past Week Active   melatonin 5 mg capsule [id number] Yes Take 1 capsule (5 mg) by mouth once daily at bedtime. Adrian Willett MD Past Week Active   methylPREDNISolone (Medrol Dospak) 4 mg tablets [id number] Yes Follow schedule on package instructions Reginald Ackerman MD Past Week Active   omeprazole (PriLOSEC) 40 mg DR capsule [id number] Yes Take 1 capsule (40 mg) by mouth once daily. Adrian Willett MD Past Week Active   potassium gluconate 595 mg (99 mg) tablet extended release [id number] Yes Take 1 tablet by mouth 1 (one) time each day. Adrian Willett MD Past Week Active   psyllium (Metamucil) 0.4 gram capsule [id number] Yes Take as directed Adrian Willett MD Past Week Active   rosuvastatin (Crestor) 5 mg tablet [id number] Yes Take 1 tablet (5 mg) by mouth once daily. Sima Miner MD Past Week Active   sertraline (Zoloft) 100 mg tablet [id number] Yes TAKE 1 TABLET BY MOUTH EVERY DAY Sima Miner MD Past Week Active   traMADol (Ultram) 50 mg tablet [id number] Yes Take 1 tablet (50 mg) by mouth 4 times a day as needed. Adrian Willett MD Past Week Active   turmeric root extract 500 mg tablet [id number] Yes Take 1 tablet by mouth 1 (one) time each day. Adrian Willett MD Past Week Active                    Allergies  Allergies   Allergen Reactions    Statins-Hmg-Coa Reductase Inhibitors Myalgia       Social History  Social History     Socioeconomic History    Marital status:      Spouse name: Not on file    Number of children: Not on file    Years of education: Not on file    Highest education level: Not on file   Occupational History    Not on file   Tobacco Use    Smoking status: Former     Current packs/day: 0.00     Types: Cigarettes     Quit date:      Years since quittin.4     Passive exposure:  Never    Smokeless tobacco: Never   Substance and Sexual Activity    Alcohol use: Yes    Drug use: Never    Sexual activity: Not on file   Other Topics Concern    Not on file   Social History Narrative    Not on file     Social Determinants of Health     Financial Resource Strain: Not on file   Food Insecurity: Not on file   Transportation Needs: Not on file   Physical Activity: Not on file   Stress: Not on file   Social Connections: Not on file   Intimate Partner Violence: Not on file   Housing Stability: Not on file       Surgical History  Past Surgical History:   Procedure Laterality Date    APPENDECTOMY  1981    CHOLECYSTECTOMY  1994    COLONOSCOPY  09/2019    early adenomatous glandular changes-due 2024    ESOPHAGUS SURGERY      HYSTERECTOMY  1987    STEVO USO    PANCREAS SURGERY  2019    30% of pancreas removed    SHOULDER SURGERY Right 1977    1 pin    SPLENECTOMY, TOTAL         Orders Placed This Encounter    XR tibia fibula right 2 views    meloxicam (Mobic) 15 mg tablet      At the conclusion of the visit there were no further questions by the patient/family regarding their plan of care.  Patient was instructed to call or return with any issues, questions, or concerns regarding their injury and/or treatment plan described above.     05/29/24 at 4:16 PM - Kaushik Salinas DO    Office: [phone]    This note was prepared using voice recognition software.  The details of this note are correct and have been reviewed, and corrected to the best of my ability.  Some grammatical errors may persist related to the Dragon software.

## 2024-06-03 ENCOUNTER — APPOINTMENT (OUTPATIENT)
Dept: ORTHOPEDIC SURGERY | Facility: CLINIC | Age: 74
End: 2024-06-03
Payer: MEDICARE

## 2024-06-03 DIAGNOSIS — F41.9 ANXIETY: ICD-10-CM

## 2024-06-03 RX ORDER — ALPRAZOLAM 0.25 MG/1
0.25 TABLET ORAL NIGHTLY PRN
Qty: 30 TABLET | Refills: 0 | Status: SHIPPED | OUTPATIENT
Start: 2024-06-03

## 2024-06-03 NOTE — TELEPHONE ENCOUNTER
We will send a 1 month supply to [facility].  She is overdue for her [facility] appt but has an appt scheduled 6/19/24

## 2024-06-10 ENCOUNTER — OFFICE VISIT (OUTPATIENT)
Dept: ORTHOPEDIC SURGERY | Facility: CLINIC | Age: 74
End: 2024-06-10
Payer: MEDICARE

## 2024-06-10 DIAGNOSIS — G54.5 PARSONAGE-TURNER SYNDROME: ICD-10-CM

## 2024-06-10 PROCEDURE — 1157F ADVNC CARE PLAN IN RCRD: CPT | Performed by: STUDENT IN AN ORGANIZED HEALTH CARE EDUCATION/TRAINING PROGRAM

## 2024-06-10 PROCEDURE — 99213 OFFICE O/P EST LOW 20 MIN: CPT | Performed by: STUDENT IN AN ORGANIZED HEALTH CARE EDUCATION/TRAINING PROGRAM

## 2024-06-10 PROCEDURE — 99214 OFFICE O/P EST MOD 30 MIN: CPT | Performed by: STUDENT IN AN ORGANIZED HEALTH CARE EDUCATION/TRAINING PROGRAM

## 2024-06-10 NOTE — PROGRESS NOTES
History of Present Illness:  Presents for evaluation of right upper arm numbness and tingling.  States this has been present since February 2023.  Started in the upper arm radiating to the shoulder.  More recently radiating into her face and neck.  Denies numbness and tingling to the arm and hand.  She has undergone physical therapy and cortisone injections into the shoulder without improvement.  EMG suggestive of mild right carpal tunnel and additionally brachial plexopathy/[place]-Leal.  MRI cervical without considerable compression.     Past Medical History:   Diagnosis Date    Abdominal wall pain     Abnormal EKG     Bilateral kidney stones     History of mammogram 04/18/2023    cat 2    Thyrotoxicosis          Review of Systems   GENERAL: Negative for malaise, significant weight loss, fever  MUSCULOSKELETAL: see HPI  NEURO:  Negative    The patient's past medical history, family history, social history, and review of systems were reviewed. History is otherwise negative except as stated in the HPI.    Physical Examination:  General: Alert and oriented to person, place, and time.  No acute distress and breathing comfortably: Pleasant and cooperative with examination.  HEENT: Head is normocephalic and atraumatic.  Neck: Supple, no visible swelling.  Cardiovascular: No palpable tachycardia  Lungs: No audible wheezing or labored breathing  Abdomen: Nondistended.    On musculoskeletal examination,   Able to make a full composite fist.  Able to fully extend all her digits against resistance.  5 out of 5 intrinsic strength.  5 out of 5 thenar strength.  Full strength to wrist flexion extension, full strength to elbow flexion extension.  Full strength to shoulder abduction abduction internal and external rotation.  Sensation tact light touch throughout hand.  Negative Phalen Durkan Tinel carpal tunnel.     Imaging:  EMG with moderate right carpal tunnel.    Assessment:  Patient with upper arm numbness and  tingling.  EMG with findings consistent with brachial plexopathy.  This has been going on for 14 months.  She does have new numbness that radiates into the neck and the face.  Based on this I recommend that we have neurology referral to rule out supratentorial origins.  All questions were addressed.  Discussed the diagnosis of [place]-Leal and that resolution can take 12 to 18 months.      Plan:  Recommend neurology evaluation.  Follow-up as needed    Discussed moderate carpal tunnel on EMG.  She is currently asymptomatic from this perspective.  She will initiate nighttime bracing if becomes symptomatic.        Nargis Stephens MD

## 2024-06-13 ENCOUNTER — LAB (OUTPATIENT)
Dept: LAB | Facility: LAB | Age: 74
End: 2024-06-13
Payer: MEDICARE

## 2024-06-13 DIAGNOSIS — C15.9 ESOPHAGEAL ADENOCARCINOMA (MULTI): ICD-10-CM

## 2024-06-13 DIAGNOSIS — E78.5 HYPERLIPIDEMIA, UNSPECIFIED HYPERLIPIDEMIA TYPE: ICD-10-CM

## 2024-06-13 DIAGNOSIS — Z79.899 MEDICATION MANAGEMENT: ICD-10-CM

## 2024-06-13 DIAGNOSIS — C77.2 SECONDARY AND UNSPECIFIED MALIGNANT NEOPLASM OF INTRA-ABDOMINAL LYMPH NODES (MULTI): ICD-10-CM

## 2024-06-13 DIAGNOSIS — R73.9 HYPERGLYCEMIA: ICD-10-CM

## 2024-06-13 DIAGNOSIS — M06.9 RHEUMATOID ARTHRITIS, INVOLVING UNSPECIFIED SITE, UNSPECIFIED WHETHER RHEUMATOID FACTOR PRESENT (MULTI): ICD-10-CM

## 2024-06-13 DIAGNOSIS — C15.9 ADENOCARCINOMA OF ESOPHAGUS (MULTI): ICD-10-CM

## 2024-06-13 DIAGNOSIS — C73 PAPILLARY THYROID CARCINOMA (MULTI): ICD-10-CM

## 2024-06-13 DIAGNOSIS — F41.9 ANXIETY: ICD-10-CM

## 2024-06-13 DIAGNOSIS — F32.0 DEPRESSION, MAJOR, SINGLE EPISODE, MILD (CMS-HCC): ICD-10-CM

## 2024-06-13 DIAGNOSIS — K50.919 CROHN'S DISEASE WITH COMPLICATION, UNSPECIFIED GASTROINTESTINAL TRACT LOCATION (MULTI): ICD-10-CM

## 2024-06-13 LAB
ALBUMIN SERPL BCP-MCNC: 4 G/DL (ref 3.4–5)
ALP SERPL-CCNC: 62 U/L (ref 33–136)
ALT SERPL W P-5'-P-CCNC: 12 U/L (ref 7–45)
AMPHETAMINES UR QL SCN: ABNORMAL
ANION GAP SERPL CALC-SCNC: 12 MMOL/L (ref 10–20)
AST SERPL W P-5'-P-CCNC: 18 U/L (ref 9–39)
BARBITURATES UR QL SCN: ABNORMAL
BASOPHILS # BLD AUTO: 0.05 X10*3/UL (ref 0–0.1)
BASOPHILS NFR BLD AUTO: 0.8 %
BENZODIAZ UR QL SCN: ABNORMAL
BILIRUB SERPL-MCNC: 0.5 MG/DL (ref 0–1.2)
BUN SERPL-MCNC: 14 MG/DL (ref 6–23)
BZE UR QL SCN: ABNORMAL
CALCIUM SERPL-MCNC: 9.2 MG/DL (ref 8.6–10.3)
CANNABINOIDS UR QL SCN: ABNORMAL
CHLORIDE SERPL-SCNC: 104 MMOL/L (ref 98–107)
CHOLEST SERPL-MCNC: 231 MG/DL (ref 0–199)
CHOLESTEROL/HDL RATIO: 3.8
CO2 SERPL-SCNC: 32 MMOL/L (ref 21–32)
CREAT SERPL-MCNC: 0.82 MG/DL (ref 0.5–1.05)
EGFRCR SERPLBLD CKD-EPI 2021: 76 ML/MIN/1.73M*2
EOSINOPHIL # BLD AUTO: 0.16 X10*3/UL (ref 0–0.4)
EOSINOPHIL NFR BLD AUTO: 2.4 %
ERYTHROCYTE [DISTWIDTH] IN BLOOD BY AUTOMATED COUNT: 14.3 % (ref 11.5–14.5)
FENTANYL+NORFENTANYL UR QL SCN: ABNORMAL
GLUCOSE SERPL-MCNC: 94 MG/DL (ref 74–99)
HCT VFR BLD AUTO: 43.8 % (ref 36–46)
HDLC SERPL-MCNC: 60.2 MG/DL
HGB BLD-MCNC: 14.4 G/DL (ref 12–16)
IMM GRANULOCYTES # BLD AUTO: 0.01 X10*3/UL (ref 0–0.5)
IMM GRANULOCYTES NFR BLD AUTO: 0.2 % (ref 0–0.9)
LDLC SERPL CALC-MCNC: 151 MG/DL
LYMPHOCYTES # BLD AUTO: 2.89 X10*3/UL (ref 0.8–3)
LYMPHOCYTES NFR BLD AUTO: 43.6 %
MCH RBC QN AUTO: 34 PG (ref 26–34)
MCHC RBC AUTO-ENTMCNC: 32.9 G/DL (ref 32–36)
MCV RBC AUTO: 103 FL (ref 80–100)
METHADONE UR QL SCN: ABNORMAL
MONOCYTES # BLD AUTO: 0.71 X10*3/UL (ref 0.05–0.8)
MONOCYTES NFR BLD AUTO: 10.7 %
NEUTROPHILS # BLD AUTO: 2.81 X10*3/UL (ref 1.6–5.5)
NEUTROPHILS NFR BLD AUTO: 42.3 %
NON HDL CHOLESTEROL: 171 MG/DL (ref 0–149)
NRBC BLD-RTO: 0 /100 WBCS (ref 0–0)
OPIATES UR QL SCN: ABNORMAL
OXYCODONE+OXYMORPHONE UR QL SCN: ABNORMAL
PCP UR QL SCN: ABNORMAL
PLATELET # BLD AUTO: 228 X10*3/UL (ref 150–450)
POTASSIUM SERPL-SCNC: 4.3 MMOL/L (ref 3.5–5.3)
PROT SERPL-MCNC: 6.2 G/DL (ref 6.4–8.2)
RBC # BLD AUTO: 4.24 X10*6/UL (ref 4–5.2)
SODIUM SERPL-SCNC: 144 MMOL/L (ref 136–145)
TRIGL SERPL-MCNC: 98 MG/DL (ref 0–149)
VLDL: 20 MG/DL (ref 0–40)
WBC # BLD AUTO: 6.6 X10*3/UL (ref 4.4–11.3)

## 2024-06-13 PROCEDURE — 80061 LIPID PANEL: CPT

## 2024-06-13 PROCEDURE — 85025 COMPLETE CBC W/AUTO DIFF WBC: CPT

## 2024-06-13 PROCEDURE — 83036 HEMOGLOBIN GLYCOSYLATED A1C: CPT

## 2024-06-13 PROCEDURE — 80346 BENZODIAZEPINES1-12: CPT

## 2024-06-13 PROCEDURE — 80053 COMPREHEN METABOLIC PANEL: CPT

## 2024-06-13 PROCEDURE — 36415 COLL VENOUS BLD VENIPUNCTURE: CPT

## 2024-06-13 PROCEDURE — 80307 DRUG TEST PRSMV CHEM ANLYZR: CPT

## 2024-06-14 LAB
EST. AVERAGE GLUCOSE BLD GHB EST-MCNC: 105 MG/DL
HBA1C MFR BLD: 5.3 %

## 2024-06-17 LAB
1OH-MIDAZOLAM UR CFM-MCNC: <25 NG/ML
7AMINOCLONAZEPAM UR CFM-MCNC: <25 NG/ML
A-OH ALPRAZ UR CFM-MCNC: 141 NG/ML
ALPRAZ UR CFM-MCNC: 34 NG/ML
CHLORDIAZEP UR CFM-MCNC: <25 NG/ML
CLONAZEPAM UR CFM-MCNC: <25 NG/ML
DIAZEPAM UR CFM-MCNC: <25 NG/ML
LORAZEPAM UR CFM-MCNC: <25 NG/ML
MIDAZOLAM UR CFM-MCNC: <25 NG/ML
NORDIAZEPAM UR CFM-MCNC: <25 NG/ML
OXAZEPAM UR CFM-MCNC: <25 NG/ML
TEMAZEPAM UR CFM-MCNC: <25 NG/ML

## 2024-06-19 ENCOUNTER — APPOINTMENT (OUTPATIENT)
Dept: PRIMARY CARE | Facility: CLINIC | Age: 74
End: 2024-06-19
Payer: MEDICARE

## 2024-06-19 VITALS
TEMPERATURE: 97.4 F | SYSTOLIC BLOOD PRESSURE: 102 MMHG | HEART RATE: 56 BPM | HEIGHT: 64 IN | OXYGEN SATURATION: 96 % | RESPIRATION RATE: 16 BRPM | BODY MASS INDEX: 19.63 KG/M2 | DIASTOLIC BLOOD PRESSURE: 58 MMHG | WEIGHT: 115 LBS

## 2024-06-19 DIAGNOSIS — F41.9 ANXIETY: ICD-10-CM

## 2024-06-19 DIAGNOSIS — C15.9 ESOPHAGEAL ADENOCARCINOMA (MULTI): ICD-10-CM

## 2024-06-19 DIAGNOSIS — Z93.1 GASTROSTOMY STATUS (MULTI): ICD-10-CM

## 2024-06-19 DIAGNOSIS — Z12.31 ENCOUNTER FOR SCREENING MAMMOGRAM FOR BREAST CANCER: ICD-10-CM

## 2024-06-19 DIAGNOSIS — I25.10 ATHSCL HEART DISEASE OF NATIVE CORONARY ARTERY W/O ANG PCTRS: ICD-10-CM

## 2024-06-19 DIAGNOSIS — E78.5 HYPERLIPIDEMIA, UNSPECIFIED HYPERLIPIDEMIA TYPE: ICD-10-CM

## 2024-06-19 DIAGNOSIS — E46 PROTEIN-CALORIE MALNUTRITION, UNSPECIFIED SEVERITY (MULTI): ICD-10-CM

## 2024-06-19 DIAGNOSIS — Z00.00 ENCOUNTER FOR MEDICARE ANNUAL WELLNESS EXAM: ICD-10-CM

## 2024-06-19 DIAGNOSIS — R73.9 HYPERGLYCEMIA: ICD-10-CM

## 2024-06-19 DIAGNOSIS — C77.2 SECONDARY AND UNSPECIFIED MALIGNANT NEOPLASM OF INTRA-ABDOMINAL LYMPH NODES (MULTI): ICD-10-CM

## 2024-06-19 DIAGNOSIS — G47.9 SLEEP DIFFICULTIES: ICD-10-CM

## 2024-06-19 DIAGNOSIS — I26.99 PULMONARY EMBOLISM, UNSPECIFIED CHRONICITY, UNSPECIFIED PULMONARY EMBOLISM TYPE, UNSPECIFIED WHETHER ACUTE COR PULMONALE PRESENT (MULTI): ICD-10-CM

## 2024-06-19 DIAGNOSIS — Z79.899 MEDICATION MANAGEMENT: ICD-10-CM

## 2024-06-19 DIAGNOSIS — K50.919 CROHN'S DISEASE WITH COMPLICATION, UNSPECIFIED GASTROINTESTINAL TRACT LOCATION (MULTI): ICD-10-CM

## 2024-06-19 DIAGNOSIS — F32.0 DEPRESSION, MAJOR, SINGLE EPISODE, MILD (CMS-HCC): ICD-10-CM

## 2024-06-19 DIAGNOSIS — M06.9 RHEUMATOID ARTHRITIS, INVOLVING UNSPECIFIED SITE, UNSPECIFIED WHETHER RHEUMATOID FACTOR PRESENT (MULTI): ICD-10-CM

## 2024-06-19 DIAGNOSIS — C73 PAPILLARY THYROID CARCINOMA (MULTI): ICD-10-CM

## 2024-06-19 DIAGNOSIS — K86.1 CHRONIC PANCREATITIS, UNSPECIFIED PANCREATITIS TYPE (MULTI): ICD-10-CM

## 2024-06-19 PROCEDURE — 1157F ADVNC CARE PLAN IN RCRD: CPT | Performed by: FAMILY MEDICINE

## 2024-06-19 PROCEDURE — 1036F TOBACCO NON-USER: CPT | Performed by: FAMILY MEDICINE

## 2024-06-19 PROCEDURE — 1159F MED LIST DOCD IN RCRD: CPT | Performed by: FAMILY MEDICINE

## 2024-06-19 PROCEDURE — 99397 PER PM REEVAL EST PAT 65+ YR: CPT | Performed by: FAMILY MEDICINE

## 2024-06-19 PROCEDURE — 1160F RVW MEDS BY RX/DR IN RCRD: CPT | Performed by: FAMILY MEDICINE

## 2024-06-19 PROCEDURE — G0439 PPPS, SUBSEQ VISIT: HCPCS | Performed by: FAMILY MEDICINE

## 2024-06-19 PROCEDURE — 1170F FXNL STATUS ASSESSED: CPT | Performed by: FAMILY MEDICINE

## 2024-06-19 PROCEDURE — 1123F ACP DISCUSS/DSCN MKR DOCD: CPT | Performed by: FAMILY MEDICINE

## 2024-06-19 RX ORDER — ROSUVASTATIN CALCIUM 5 MG/1
5 TABLET, COATED ORAL DAILY
Qty: 90 TABLET | Refills: 3 | Status: SHIPPED | OUTPATIENT
Start: 2024-06-19 | End: 2025-06-14

## 2024-06-19 RX ORDER — ALPRAZOLAM 0.25 MG/1
0.25 TABLET ORAL 3 TIMES DAILY PRN
Qty: 40 TABLET | Refills: 1 | Status: SHIPPED | OUTPATIENT
Start: 2024-06-19

## 2024-06-19 ASSESSMENT — ACTIVITIES OF DAILY LIVING (ADL)
GROCERY_SHOPPING: INDEPENDENT
DRESSING: INDEPENDENT
TAKING_MEDICATION: INDEPENDENT
DOING_HOUSEWORK: INDEPENDENT
BATHING: INDEPENDENT
MANAGING_FINANCES: INDEPENDENT

## 2024-06-19 ASSESSMENT — PATIENT HEALTH QUESTIONNAIRE - PHQ9
SUM OF ALL RESPONSES TO PHQ9 QUESTIONS 1 AND 2: 0
1. LITTLE INTEREST OR PLEASURE IN DOING THINGS: NOT AT ALL
2. FEELING DOWN, DEPRESSED OR HOPELESS: NOT AT ALL

## 2024-06-19 ASSESSMENT — ENCOUNTER SYMPTOMS
OCCASIONAL FEELINGS OF UNSTEADINESS: 0
DEPRESSION: 0
LOSS OF SENSATION IN FEET: 0

## 2024-06-19 NOTE — PROGRESS NOTES
Subjective   Reason for Visit: Sarah Chavez is a 73 y.o. female here for a Medicare Wellness visit.   CSM 6/2024   AWV 6/2024     Covid vax: UTD   Flu: UTD   Pneumo: UTD   RSV: advised   Shingles: UTD   CRC: due 2024   Mammogram: 4/2023   Pap: hysterectomy   Lmp: hysterectomy   Sees heme onc in next wk  Aware guarded prognosis  Xanax advised taper and NOT every day    CHECKLIST REVIEWED AND COMPLETE FOR [facility]  Medicare Annual Wellness Visit Subsequent  ---------------------------------------------------------------------------------------------  Past Medical, Surgical, and Family History reviewed and updated in chart.  Hba1c 5.3  Ldl 151    Reviewed all medications by prescribing practitioner or clinical pharmacist (such as prescriptions, OTCs, herbal therapies and supplements) and documented in the medical record.    HPI    Patient Self Assessment of Health Status  Patient Self Assessment: Good    Nutrition and Exercise:  Some stress w    Stressed not depressed    Current Diet: HEALTHY Diet always best, minimizing excess carbs,   weight reduction advised if BMI not WNL, please maintain a NORMAL BMI 18.5-24.9    Adequate Fluid Intake: Yes  Caffeine: -aware to minimize intake, <300mg best to even take in LESS  Exercise Frequency: Regularly advised, weight bearing, strengthening, aerobic    Functional Ability/Level of Safety:  Home safety addressed: no active new concerns,   fall risk addressed  NO new hearing issues or concerns  [place] in ADLs addressed and areas of assistance if present -noted    ANY Cognitive Impairment Observed: No cognitive impairment observed    Home Safety Risk Factors: None  --------------------------------------------------------------------------------------------  Patient Care Team:  Sima Miner MD as PCP - General    HPI  Patient Active Problem List   Diagnosis    Actinic keratosis    Anxiety    Athscl heart disease of native coronary artery w/o ang pctrs     Cancer of abdominal esophagus (Multi)    Candidal intertrigo    Cardiomegaly    Cervical paraspinal muscle spasm    Chronic pancreatitis (Multi)    Depression, major, single episode, mild (CMS-HCC)    Dysuria    Elevated platelet count    Esophageal adenocarcinoma (Multi)    GERD (gastroesophageal reflux disease)    Globus sensation    Hematuria    Hyperglycemia    Intraepithelial neoplasm of pancreas    Mild aortic insufficiency    Mild diastolic dysfunction    Nausea and/or vomiting    Odynophagia    Papillary thyroid carcinoma (Multi)    Pleural effusion    Post-splenectomy    Pulmonary embolism (Multi)    Rheumatoid arthritis (Multi)    Seasonal allergies    Sleep difficulties    Goiter    Thyroid nodule    Trigger point of neck    Urinary tract infection    Uses feeding tube    Overweight with body mass index (BMI) of 25 to 25.9 in adult    Crohn's disease (Multi)    Encounter for Medicare annual wellness exam    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 spent in discussion.    Review of Systems:    NO Seizures  NO CAD  NO CVA    This patient has   NO history of recent Covid nor flu symptoms,  NO Fever nor chills,  NO Chest pain, shortness of breath nor paroxysmal nocturnal dyspnea,  NO Nausea, vomiting, nor diarrhea,  NO Hematochezia nor melena,  NO Dysuria, hematuria, nor new incontinence issues  NO new severe headaches nor neurological complaints,  NO new issues with  "anxiety nor depression nor new psychiatric complaints,  NO suicidal nor homicidal ideations.  ---------------------------------------------------------------------------------------------   OBJECTIVE:  /58 (BP Location: Left arm, Patient Position: Sitting, BP Cuff Size: Adult)   Pulse 56   Temp 36.3 °C (97.4 °F) (Temporal)   Resp 16   Ht 1.626 m (5' 4\")   Wt 52.2 kg (115 lb)   LMP 01/01/1987 (Approximate)   SpO2 96%   BMI 19.74 kg/m²      General:  alert, oriented, no acute distress.  No obvious skin rashes noted.   No gait disturbance noted.    Mood is pleasant, not tearful, no signs of emotional distress.  Not appearing intoxicated or altered.   No voiced delusions,   Normal, appropriate behavior.    HEENT: Normocephalic, atraumatic,   Pupils round, reactive to light  Extraocular motions intact and wnl  Tympanic membranes normal    Neck: no nuchal rigidity  No masses palpable.  No carotid bruits.  No thyromegaly.    Respiratory: Equal breath sounds  No wheezes,    rales,    nor rhonchi  No respiratory distress.    Heart: Regular rate and rhythm, no    murmurs  no rubs/gallops    Abdomen: no masses palpable, no rebound nor guarding, no rebound nor guarding.    Extremities: NO cyanosis noted, no clubbing.   No edema noted.  2+dorsalis pedis pulses.    Normal-not antalgic, steady gait.    Lab on 06/13/2024   Component Date Value Ref Range Status    Glucose 06/13/2024 94  74 - 99 mg/dL Final    Sodium 06/13/2024 144  136 - 145 mmol/L Final    Potassium 06/13/2024 4.3  3.5 - 5.3 mmol/L Final    Chloride 06/13/2024 104  98 - 107 mmol/L Final    Bicarbonate 06/13/2024 32  21 - 32 mmol/L Final    Anion Gap 06/13/2024 12  10 - 20 mmol/L Final    Urea Nitrogen 06/13/2024 14  6 - 23 mg/dL Final    Creatinine 06/13/2024 0.82  0.50 - 1.05 mg/dL Final    eGFR 06/13/2024 76  >60 mL/min/1.73m*2 Final    Calculations of estimated GFR are performed using the 2021 CKD-EPI Study Refit equation without the race variable " for the IDMS-Traceable creatinine methods.  https://jasn.asnjournals.org/content/early/2021/09/22/ASN.9213038262    Calcium 06/13/2024 9.2  8.6 - 10.3 mg/dL Final    Albumin 06/13/2024 4.0  3.4 - 5.0 g/dL Final    Alkaline Phosphatase 06/13/2024 62  33 - 136 U/L Final    Total Protein 06/13/2024 6.2 (L)  6.4 - 8.2 g/dL Final    AST 06/13/2024 18  9 - 39 U/L Final    Bilirubin, Total 06/13/2024 0.5  0.0 - 1.2 mg/dL Final    ALT 06/13/2024 12  7 - 45 U/L Final    Patients treated with Sulfasalazine may generate falsely decreased results for ALT.    Cholesterol 06/13/2024 231 (H)  0 - 199 mg/dL Final          Age      Desirable   Borderline High   High     0-19 Y     0 - 169       170 - 199     >/= 200    20-24 Y     0 - 189       190 - 224     >/= 225         >24 Y     0 - 199       200 - 239     >/= 240   **All ranges are based on fasting samples. Specific   therapeutic targets will vary based on patient-specific   cardiac risk.    Pediatric guidelines reference:Pediatrics 2011, 128(S5).Adult guidelines reference: NCEP ATPIII Guidelines,LUTHER 2001, 258:2486-97    Venipuncture immediately after or during the administration of Metamizole may lead to falsely low results. Testing should be performed immediately prior to Metamizole dosing.    HDL-Cholesterol 06/13/2024 60.2  mg/dL Final      Age       Very Low   Low     Normal    High    0-19 Y    < 35      < 40     40-45     ----  20-24 Y    ----     < 40      >45      ----        >24 Y      ----     < 40     40-60      >60      Cholesterol/HDL Ratio 06/13/2024 3.8   Final      Ref Values  Desirable  < 3.4  High Risk  > 5.0    LDL Calculated 06/13/2024 151 (H)  <=99 mg/dL Final                                Near   Borderline      AGE      Desirable  Optimal    High     High     Very High     0-19 Y     0 - 109     ---    110-129   >/= 130     ----    20-24 Y     0 - 119     ---    120-159   >/= 160     ----      >24 Y     0 -  99   100-129  130-159   160-189     >/=190       VLDL 06/13/2024 20  0 - 40 mg/dL Final    Triglycerides 06/13/2024 98  0 - 149 mg/dL Final       Age         Desirable   Borderline High   High     Very High   0 D-90 D    19 - 174         ----         ----        ----  91 D- 9 Y     0 -  74        75 -  99     >/= 100      ----    10-19 Y     0 -  89        90 - 129     >/= 130      ----    20-24 Y     0 - 114       115 - 149     >/= 150      ----         >24 Y     0 - 149       150 - 199    200- 499    >/= 500    Venipuncture immediately after or during the administration of Metamizole may lead to falsely low results. Testing should be performed immediately prior to Metamizole dosing.    Non HDL Cholesterol 06/13/2024 171 (H)  0 - 149 mg/dL Final          Age       Desirable   Borderline High   High     Very High     0-19 Y     0 - 119       120 - 144     >/= 145    >/= 160    20-24 Y     0 - 149       150 - 189     >/= 190      ----         >24 Y    30 mg/dL above LDL Cholesterol goal      Hemoglobin A1C 06/13/2024 5.3  see below % Final    Estimated Average Glucose 06/13/2024 105  Not Established mg/dL Final    WBC 06/13/2024 6.6  4.4 - 11.3 x10*3/uL Final    nRBC 06/13/2024 0.0  0.0 - 0.0 /100 WBCs Final    RBC 06/13/2024 4.24  4.00 - 5.20 x10*6/uL Final    Hemoglobin 06/13/2024 14.4  12.0 - 16.0 g/dL Final    Hematocrit 06/13/2024 43.8  36.0 - 46.0 % Final    MCV 06/13/2024 103 (H)  80 - 100 fL Final    MCH 06/13/2024 34.0  26.0 - 34.0 pg Final    MCHC 06/13/2024 32.9  32.0 - 36.0 g/dL Final    RDW 06/13/2024 14.3  11.5 - 14.5 % Final    Platelets 06/13/2024 228  150 - 450 x10*3/uL Final    Neutrophils % 06/13/2024 42.3  40.0 - 80.0 % Final    Immature Granulocytes %, Automated 06/13/2024 0.2  0.0 - 0.9 % Final    Immature Granulocyte Count (IG) includes promyelocytes, myelocytes and metamyelocytes but does not include bands. Percent differential counts (%) should be interpreted in the context of the absolute cell counts (cells/UL).    Lymphocytes %  06/13/2024 43.6  13.0 - 44.0 % Final    Monocytes % 06/13/2024 10.7  2.0 - 10.0 % Final    Eosinophils % 06/13/2024 2.4  0.0 - 6.0 % Final    Basophils % 06/13/2024 0.8  0.0 - 2.0 % Final    Neutrophils Absolute 06/13/2024 2.81  1.60 - 5.50 x10*3/uL Final    Percent differential counts (%) should be interpreted in the context of the absolute cell counts (cells/uL).    Immature Granulocytes Absolute, Au* 06/13/2024 0.01  0.00 - 0.50 x10*3/uL Final    Lymphocytes Absolute 06/13/2024 2.89  0.80 - 3.00 x10*3/uL Final    Monocytes Absolute 06/13/2024 0.71  0.05 - 0.80 x10*3/uL Final    Eosinophils Absolute 06/13/2024 0.16  0.00 - 0.40 x10*3/uL Final    Basophils Absolute 06/13/2024 0.05  0.00 - 0.10 x10*3/uL Final    Amphetamine Screen, Urine 06/13/2024 Presumptive Negative  Presumptive Negative Final    CUTOFF LEVEL: 500 NG/ML   Cross-reactivity has been reported with high concentrations   of the following drugs: buproprion, chloroquine, chlorpromazine,   ephedrine, mephentermine, fenfluramine, phentermine,   phenylpropanolamine, pseudoephedrine, and propranolol.    Barbiturate Screen, Urine 06/13/2024 Presumptive Negative  Presumptive Negative Final    CUTOFF LEVEL: 200 NG/ML    Benzodiazepines Screen, Urine 06/13/2024 Presumptive Positive (A)  Presumptive Negative Final    CUTOFF LEVEL: 200 NG/ML    Cannabinoid Screen, Urine 06/13/2024 Presumptive Negative  Presumptive Negative Final    CUTOFF LEVEL: 50 NG/ML    Cocaine Metabolite Screen, Urine 06/13/2024 Presumptive Negative  Presumptive Negative Final    CUTOFF LEVEL: 150 NG/ML    Fentanyl Screen, Urine 06/13/2024 Presumptive Negative  Presumptive Negative Final    CUTOFF LEVEL: 5 NG/ML    Opiate Screen, Urine 06/13/2024 Presumptive Negative  Presumptive Negative Final    CUTOFF LEVEL: 300 NG/ML  The opiate screen does not detect fentanyl, meperidine, or   tramadol. Oxycodone is not consistently detected (refer to  Oxycodone Screen, Urine result).    Oxycodone  Screen, Urine 06/13/2024 Presumptive Negative  Presumptive Negative Final    CUTOFF LEVEL: 100 NG/ML  This test will accurately detect both oxycodone and oxymorphone.    PCP Screen, Urine 06/13/2024 Presumptive Negative  Presumptive Negative Final    CUTOFF LEVEL:  25 NG/ML  Cross-reactivity has been reported with dextromethorphan.    Methadone Screen, Urine 06/13/2024 Presumptive Negative  Presumptive Negative Final    CUTOFF LEVEL: 150 NG/ML  The metabolite L-alpha-acetylmethadol (LAAM) is not  detected by this method in concentrations that would  be found in the urine of patients on LAAM therapy.    Clonazepam 06/13/2024 <25  <25 ng/mL Final    7-Aminoclonazepam 06/13/2024 <25  <25 ng/mL Final    Alprazolam 06/13/2024 34 (H)  <25 ng/mL Final    Consistent with use of a drug containing alprazolam, such as Xanax.      Alpha-Hydroxyalprazolam 06/13/2024 141 (H)  <25 ng/mL Final    Alprazolam metabolite; consistent with use of a drug containing alprazolam, such as Xanax.    Midazolam 06/13/2024 <25  <25 ng/mL Final    Alpha-Hydroxymidazolam 06/13/2024 <25  <25 ng/mL Final    Chlordiazepoxide 06/13/2024 <25  <25 ng/mL Final    Diazepam 06/13/2024 <25  <25 ng/mL Final    Nordiazepam 06/13/2024 <25  <25 ng/mL Final    Temazepam 06/13/2024 <25  <25 ng/mL Final    Oxazepam 06/13/2024 <25  <25 ng/mL Final    Lorazepam 06/13/2024 <25  <25 ng/mL Final   Hospital Outpatient Visit on 04/15/2024   Component Date Value Ref Range Status    WBC 04/15/2024 5.8  4.4 - 11.3 x10*3/uL Final    nRBC 04/15/2024 0.0  0.0 - 0.0 /100 WBCs Final    RBC 04/15/2024 4.32  4.00 - 5.20 x10*6/uL Final    Hemoglobin 04/15/2024 14.4  12.0 - 16.0 g/dL Final    Hematocrit 04/15/2024 44.2  36.0 - 46.0 % Final    MCV 04/15/2024 102 (H)  80 - 100 fL Final    MCH 04/15/2024 33.3  26.0 - 34.0 pg Final    MCHC 04/15/2024 32.6  32.0 - 36.0 g/dL Final    RDW 04/15/2024 13.9  11.5 - 14.5 % Final    Platelets 04/15/2024 238  150 - 450 x10*3/uL Final    Protime  04/15/2024 10.4  9.8 - 12.8 seconds Final    INR 04/15/2024 0.9  0.9 - 1.1 Final        Assessment/Plan     Problem List Items Addressed This Visit       Anxiety    Relevant Medications    ALPRAZolam (Xanax) 0.25 mg tablet    Other Relevant Orders    Drug Screen, Urine With Reflex to Confirmation    Athscl heart disease of native coronary artery w/o ang pctrs    Chronic pancreatitis (Multi)    Depression, major, single episode, mild (CMS-HCC)    Esophageal adenocarcinoma (Multi)    Hyperglycemia    Papillary thyroid carcinoma (Multi)    Pulmonary embolism (Multi)    Rheumatoid arthritis (Multi)    Sleep difficulties    Crohn's disease (Multi)    Encounter for Medicare annual wellness exam    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     Patient has capacity to make their own medical decisions: Yes  Health Care Agent/Surrogate Decision Maker documented in chart: Yes    Documents on file and valid:  Advance Directive/Living Will: Yes   Health Care Power of : Yes   Communication of Medical Status/Prognosis:   yes   Communication of Treatment Goals/Options:   yes  Treatment Decisions/involved with patient today  yes  Time Statement: Total face to face time spent on advance care planning was <30 minutes with <30 minutes spent in counseling, including the explanation.    SEE ME AT NEXT  REGULARLY SCHEDULED VISIT-sooner if condition deteriorates or new problems arise.    PATIENT WOULD LIKE TO BE A FULL CODE    NO uncontrolled DEPRESSION noted  Assessed and reviewed for opioid use.  NO EVIDENCE OF SIGNIFICANT DEMENTIA    Signs and symptoms of concern with depression-if in crisis -(no current HI/SI) will let us know and proceed to ER   Signs/symptoms of concern with dementia-and need to contact us if they occur discussed.    ASCVD  7.9 %   addressed and risk reduction conversation took place    All above counseling 15 minutes in conversation/documentation etc    Mood counseling 5min- no uncontrolled depression, good support system, has crisis plan if occurs.  Included BMI counseling and options if BMI>30        QUESTIONS answered  Cramping on statin-add coq 10  Add water    Overuse and abuse potential discussed with patient (parents if applicable)  If mood deterioration, status change etc on medicine, suicidal or homicidal ideations -patient agrees to proceed with crisis plan-in place-including-not limited to contacting family, our office and or proceeding to ER.  Reviewed controlled substance agreement - including but not limited to the risks-benefits-alternatives to treatment with a controlled substance medication(s).  It is recommended that [place] reports be checked and patient have appointment at least every 3months(6 for certain medicines only)  If the agreement signed (controlled substance agreement) is violated prescriptions may be stopped abruptly and patient /family understands and agrees to this.  Another reason for termination of agreement is if we have concern for abuse or overuse and it is also recommended that patient take responsibility to try to taper and minimize use of these medicines frequently trying to limit or gradually taper to discontinuation.    Patient is aware that side effects such as insomnia, unexpected weight changes are unexpected and should result in  discontinuation.  Always use caution and AVOID operating machinery(driving etc) on pain medicines or CNS depressants and avoid combining together OR with alcohol. If opioids are prescribed patient understands the benefits of narcan and was offered prescription.  Follow up sooner if condition deteriorates or problems arise.    Agrees to regular follow and counseling for maximum benefits.      Next visit addressed -regular visit as scheduled and follow up sooner if condition deterioration or new problems arise.    This completes Sarah Chavez ANNUAL MEDICARE WELLNESS VISIT today.  If no other follow ups discussed: Please follow up in 1 year for NEXT ANNUAL MEDICARE WELLNESS VISIT.    Sima Miner MD    This documentation is subject to inadvertent typing and other similar clerical/grammatic errors etc.

## 2024-06-20 NOTE — PROGRESS NOTES
Patient ID: Sarah Chavez is a 73 y.o. female.    Subjective    HPI  Ms. Sarah Chavez is a 72 y/o F who presents for follow-up for esophageal cancer. Currently on surveillance.     Patient reports she is doing really good other than the last couple of weeks she doesn't have much of an appetite or able to eat much. She feels full and has to take an phil seltzer for relief after eating. At times it is even hard to swallow water. She denies any new pain. No constipation or diarrhea. No recent infections, fevers, or chills. No new lumps, bumps, or rash. 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 She has seen several different providers and was found to only have very mild carpal tunnel and no rotator cuff involvement. She reports it feeling tight and itching at times. Neurology was recommended. No other major complaints at this time.     Patient's past medical history, surgical history, family history and social history reviewed.  Objective      LMP 01/01/1987 (Approximate)   Vitals:    06/21/24 1017   BP: 119/76   Pulse: 73   Resp: 16   Temp: 36.1 °C (97 °F)   SpO2: 96%       Review of Systems:   Review of Systems:    Positive per HPI, otherwise negative.   Physical Exam  Gen: appears well in clinic, NAD  HEENT: atraumatic head, normocephalic, EOMI, conjunctiva normal  LUNG: no increased WOB, CTAB  CV: No JVD. RRR  GI: soft, NT, ND  LE: no LE edema  Skin: no obvious rashes or lesions on visible skin  Neuro: interactive, no focal deficits noted  Psych: normal mood and affect  Performance Status:  Symptomatic; fully ambulatory    Labs/Imaging/Pathology: personally reviewed reports and images in Epic electronic medical record system. Pertinent results as it related to the plan represented in below in assessment and plan.   Assessment/Plan   1. GE junction adenocarcinoma, ypT2N1  - Initially presented to GI  1/10/2022 with epigastric pain and  dysphasia, weight loss. Patient  underwent an EGD on 1/21/2022 with Dr. Lora and was found to have a malignant esophageal tumor at the GE junction . An upper EUS with Dr. Delgadillo  on 2/3/2022 found to have a malignant tumor in the lower esophagus, at the GE  junction and extending into the cardia. Pathology returned as adenocarcinoma stage T3N0 by endosonographic criteria.   - 3/2/2022 - Started FOLFOX, completed 3 cycles, C2 onward 80% paclitaxel due to neuropathy.  - 4/8/2022 - PET/CT per [facility] 16032 with great response.   - 4/18/2022 - Started on RT with FOLFOX on 4/19/2022.  - C5 skipped due to thrombocytopenia.  - 5/11/2022 - Last cycle of FOLFOX.  - 6/27/2022 - Post CRT PET with good response.  - 7/20/2022 Status-post esophagectomy, final pathology revealed an 0.5 cm residual foci of poorly differentiated adenocarcinoma along with 1/15 lymph nodes with metastatic  carcinoma. Final stage ypT2 N1.  - Discussed the role of adjuvant nivolumab per Checkmate 577 which showed an increase in disease-free survival when nivolumab was given every 2 weeks for 8 treatments followed by every 4 weeks to complete the year.   - Reviewed nivolumab and side effects. Patient was agreeable to treatment and consent signed.   - 8/2/23 completed one year of adjuvant nivolumab     8/2/23:  - No contraindications to proceed with last treatment of nivolumab today. She has completed 1 year of treatment.   - We discussed that she already has her next scans scheduled through Surgery.  - We discussed that she would like to keep her port in due to having poor IV access.  - We will plan for 6-week port flush and follow-up with me in 12 weeks.   - She will call us with any worsening symptoms.   - Her major compliant continues to be poor appetite and trouble eating. We will have repeat labs with immunotherapy in 6 weeks and I will see her again in 12 weeks.   - RTC with me in 12 weeks.      11/8/23:  - We will plan for a port flush today and  blood work.   - She is planned for an ultrasound and follow-up with Dr. Poon tomorrow.  - She did have a new rash on her toes concerning for bullous pemphigoid versus a hypersensitivity that resolved with steroids. We discussed if those symptoms come back to call us and we can get her in with Dermatology   as she may need steroids for a longer period of time.  - We will plan to check an ACTH and TSH with labs today. If unremarkable, we will plan for follow-up in 3 months with a CT chest prior along with CBC, CMP, ACTH and TSH.   - We will plan for another Signatera in 3 months.   - We will plan for a port flush in 8 weeks.  - Due to traveling her follow up and port flushes will be stretched out to allow for travel to [place] in February 2024.   - RTC with me in March 2024.       3/21/24:  - Reviewed most recent blood work and CT scans. No evidence of recurrence.   - At this time, she would like to have her port removed which we will help arrange.   - Otherwise, we will plan for follow-up in 3 months with labs and likely plan for imaging in 6 months with CT chest.   - RTC with Hector in 3 months and with me in 6 months.      6/21/24:  - Patient is describing some satiety and trouble with eating.   - We discussed he plan for a CT of the chest to rule out progression or recurrence of disease.  - Given she is having trouble swallowing and some neuropathy and nerve changes in her right shoulder and neck we will plan for a CT of the neck as well  - will reach out to Dr Lora regarding repeat EGD with her new dysphagia  - RTC in 2 weeks with phone visit.      Reviewed ongoing medical problems and how they relate to her GE junction adenocarcinoma, will continue long term monitoring.    RTC in 2 weeks with phone visit. This note has been transcribed using a medical scribe and there is a possibility of unintentional typing misprints.     Diagnoses and all orders for this visit:  Adenocarcinoma of esophagus (Multi)  -      Clinic Appointment Request  Adenocarcinoma of gastroesophageal junction (Multi)  -     CBC and Auto Differential; Future  -     Comprehensive Metabolic Panel; Future  -     Signatera; Other-indicate in comment; unknown - Miscellaneous Test; Future  -     CT chest w IV contrast; Future  -     CT soft tissue neck w IV contrast; Future  -     Clinic Appointment Request Virtual Est (phone end of cay); Future         Renay Lau MD  Hematology/Oncology  [facility]    Scribe Attestation  By signing my name below, IMolly Scribe attest that this documentation has been prepared under the direction and in the presence of Renay Lau MD.

## 2024-06-21 ENCOUNTER — OFFICE VISIT (OUTPATIENT)
Dept: HEMATOLOGY/ONCOLOGY | Facility: CLINIC | Age: 74
End: 2024-06-21
Payer: MEDICARE

## 2024-06-21 VITALS
BODY MASS INDEX: 19.6 KG/M2 | TEMPERATURE: 97 F | OXYGEN SATURATION: 96 % | SYSTOLIC BLOOD PRESSURE: 119 MMHG | HEART RATE: 73 BPM | RESPIRATION RATE: 16 BRPM | WEIGHT: 114.2 LBS | DIASTOLIC BLOOD PRESSURE: 76 MMHG

## 2024-06-21 DIAGNOSIS — C16.0 ADENOCARCINOMA OF GASTROESOPHAGEAL JUNCTION (MULTI): ICD-10-CM

## 2024-06-21 DIAGNOSIS — C15.9 ADENOCARCINOMA OF ESOPHAGUS (MULTI): ICD-10-CM

## 2024-06-21 PROCEDURE — 1157F ADVNC CARE PLAN IN RCRD: CPT | Performed by: INTERNAL MEDICINE

## 2024-06-21 PROCEDURE — 1159F MED LIST DOCD IN RCRD: CPT | Performed by: INTERNAL MEDICINE

## 2024-06-21 PROCEDURE — 1123F ACP DISCUSS/DSCN MKR DOCD: CPT | Performed by: INTERNAL MEDICINE

## 2024-06-21 PROCEDURE — 1126F AMNT PAIN NOTED NONE PRSNT: CPT | Performed by: INTERNAL MEDICINE

## 2024-06-21 PROCEDURE — G2211 COMPLEX E/M VISIT ADD ON: HCPCS | Performed by: INTERNAL MEDICINE

## 2024-06-21 PROCEDURE — 99214 OFFICE O/P EST MOD 30 MIN: CPT | Performed by: INTERNAL MEDICINE

## 2024-06-21 ASSESSMENT — PAIN SCALES - GENERAL: PAINLEVEL: 0-NO PAIN

## 2024-06-26 ENCOUNTER — NUTRITION (OUTPATIENT)
Dept: HEMATOLOGY/ONCOLOGY | Facility: CLINIC | Age: 74
End: 2024-06-26
Payer: MEDICARE

## 2024-06-26 VITALS — BODY MASS INDEX: 19.5 KG/M2 | HEIGHT: 64 IN | WEIGHT: 114.2 LBS

## 2024-06-26 DIAGNOSIS — C16.0 ADENOCARCINOMA OF GASTROESOPHAGEAL JUNCTION (MULTI): ICD-10-CM

## 2024-06-26 DIAGNOSIS — C15.9 ADENOCARCINOMA OF ESOPHAGUS (MULTI): ICD-10-CM

## 2024-06-26 NOTE — PROGRESS NOTES
"NUTRITION Assessment NOTE    Nutrition Assessment     Reason for Visit:  Sarah Chavez is a 73 y.o. female with a history of esophageal cancer, s/p neoadjuvant treatment, esophagectomy, and 1 year Nivo. She is having difficulty swallowing and is meeting with this service to discuss further.     Lab Results   Component Value Date/Time    GLUCOSE 94 06/13/2024 1017     06/13/2024 1017    K 4.3 06/13/2024 1017     06/13/2024 1017    CO2 32 06/13/2024 1017    ANIONGAP 12 06/13/2024 1017    BUN 14 06/13/2024 1017    CREATININE 0.82 06/13/2024 1017    EGFR 76 06/13/2024 1017    CALCIUM 9.2 06/13/2024 1017    ALBUMIN 4.0 06/13/2024 1017    ALKPHOS 62 06/13/2024 1017    PROT 6.2 (L) 06/13/2024 1017    AST 18 06/13/2024 1017    BILITOT 0.5 06/13/2024 1017    ALT 12 06/13/2024 1017     Anthropometrics:  Anthropometrics  Height: 162.6 cm (5' 4.02\")  Weight: 51.8 kg (114 lb 3.2 oz)  BMI (Calculated): 19.59  IBW/kg (Dietitian Calculated): 54.55 kg  Percent of IBW: 94.96 %    Wt Readings from Last 10 Encounters:   06/26/24 51.8 kg (114 lb 3.2 oz)   06/21/24 51.8 kg (114 lb 3.2 oz)   06/19/24 52.2 kg (115 lb)   04/15/24 51.7 kg (114 lb)   03/21/24 54 kg (119 lb 0.8 oz)   04/03/24 52.6 kg (116 lb)   12/19/23 51.7 kg (114 lb)   11/08/23 51.1 kg (112 lb 10.5 oz)   10/10/23 51.7 kg (114 lb)   06/28/23 51.7 kg (114 lb)        Food And Nutrient Intake:  Patient is s/p esophagectomy 7/20/22. New difficulty swallowing.        Swallowing difficulty, no appetite, feels like she is eating just to \"survive\". Is maintaining. Symptoms starting a few months ago and gradually getting worse.      Constipation x 3-4 days. When she finally goes, she gets crampy or struggles to go. Abdominal pain triggers bile regurgitation. Stools soft/not well formed. Blackish in color.      Breakfast; Scrambled egg with watson--struggled to eat that and didn't eat the rest of the day.      Does not like protein shakes. Has a hard time tolerating " "milk. Is interested in trying clear protein drinks.      Is waiting to hear from Dr. Lora re: GI referral, but has not heard from his department at this time. CT scan moved up to July 8th.      Is taking many vitamins and supplements. Does take a lot to swallow these. Discussed condensing into one MVI so she doesn't fill up on supplements.      Despite recent swallowing and poor PO intake, she has not experienced significant weight loss. Weight stable x 1 year.     Energy Needs  Calculated Energy Needs Using Equations  Height: 162.6 cm (5' 4.02\")  Estimated Energy Needs  Total Energy Estimated Needs (kCal): 1554 kCal  Method for Estimating Needs: 30 kcal/kg CBW  Estimated Fluid Needs  Total Fluid Estimated Needs (mL): 1554 mL  Method for Estimating Needs: 1 mL/kcal  Estimated Protein Needs  Total Protein Estimated Needs (g): 62.16 g  Method for Estimating Needs: 1.2g/kg CBW    Nutrition Diagnosis   Nutrition Diagnosis  Patient has Nutrition Diagnosis: Yes  Nutrition Diagnosis 1: Swallowing difficulity  Related to (1): history of esophageal cancer--further workup indicated  As Evidenced by (1): subjective reports of swallowing difficulty leading to inadequate oral intake  Additional Nutrition Diagnosis: Diagnosis 2    Nutrition Interventions/Recommendations   Nutrition Prescription   Recommend high protein/high kcal soft/pureed/full liquid diet (texture per tolerance) with small, frequent feedings several times per day. Patient reports normal PO intake is 1x/day, so we discussed increasing to at least 3x/day.   Recommend trying Ensure Clear, Boost Breeze, Pro-Stat, and/or other clear protein supplements to see if they are better tolerated   Reviewed vitamin/supplement. Encouraged her to condense current use into 1 MVI. Reviewed which supplements she could stop taking and those to continue.     Coordination of Care   Discussed GI referral with team    Nutrition Monitoring and Evaluation   Food/Nutrient Related " History Monitoring  Monitoring and Evaluation Plan: Energy intake, Fluid intake, Meal/snack pattern, Protein intake  Body Composition/Growth/Weight History  Monitoring and Evaluation Plan: Weight  Criteria: weight and LBM maintenance  Nutrition Focused Physical Findings  Monitoring and Evaluation Plan: Digestive System  Digestive System: Early satiety, Other (Comment), Constipation, Decrease in appetite  Other: dysphagia    RD contact information provided. This service will continue to follow.

## 2024-07-02 ENCOUNTER — TELEPHONE (OUTPATIENT)
Dept: HEMATOLOGY/ONCOLOGY | Facility: CLINIC | Age: 74
End: 2024-07-02
Payer: MEDICARE

## 2024-07-02 DIAGNOSIS — C15.9 ESOPHAGEAL ADENOCARCINOMA (MULTI): ICD-10-CM

## 2024-07-02 NOTE — TELEPHONE ENCOUNTER
Spoke with the patient. Provided update from Dr Lau. Referral pended to Dr. Lau for approval. Pt verbalized understanding and had no further questions or concerns at this time.

## 2024-07-02 NOTE — TELEPHONE ENCOUNTER
Spoke with the patient. Saw Dr. Lau on June 21st and was told she needs a gastro appt for a scope- Dr. Lora for new dysphagia. Pt states she has not heard from anyone to schedule. Explained I would follow up with Dr Lau and then call her back once I receive a response. States OK to leave message if she doesn't answer.

## 2024-07-05 ENCOUNTER — HOSPITAL ENCOUNTER (OUTPATIENT)
Dept: WOMENS IMAGING | Age: 74
End: 2024-07-05
Attending: FAMILY MEDICINE
Payer: MEDICARE

## 2024-07-05 VITALS — WEIGHT: 114 LBS | BODY MASS INDEX: 20.85 KG/M2

## 2024-07-05 DIAGNOSIS — Z12.31 ENCOUNTER FOR SCREENING MAMMOGRAM FOR BREAST CANCER: ICD-10-CM

## 2024-07-05 PROCEDURE — 77063 BREAST TOMOSYNTHESIS BI: CPT

## 2024-07-08 ENCOUNTER — HOSPITAL ENCOUNTER (OUTPATIENT)
Dept: RADIOLOGY | Facility: CLINIC | Age: 74
Discharge: HOME | End: 2024-07-08
Payer: MEDICARE

## 2024-07-08 DIAGNOSIS — C16.0 ADENOCARCINOMA OF GASTROESOPHAGEAL JUNCTION (MULTI): ICD-10-CM

## 2024-07-08 PROCEDURE — 70491 CT SOFT TISSUE NECK W/DYE: CPT | Performed by: RADIOLOGY

## 2024-07-08 PROCEDURE — 70491 CT SOFT TISSUE NECK W/DYE: CPT

## 2024-07-08 PROCEDURE — 2550000001 HC RX 255 CONTRASTS: Performed by: INTERNAL MEDICINE

## 2024-07-08 PROCEDURE — 71260 CT THORAX DX C+: CPT

## 2024-07-11 ENCOUNTER — TELEMEDICINE (OUTPATIENT)
Dept: HEMATOLOGY/ONCOLOGY | Facility: CLINIC | Age: 74
End: 2024-07-11
Payer: MEDICARE

## 2024-07-11 DIAGNOSIS — C16.0 ADENOCARCINOMA OF GASTROESOPHAGEAL JUNCTION (MULTI): Primary | ICD-10-CM

## 2024-07-11 PROCEDURE — 1157F ADVNC CARE PLAN IN RCRD: CPT | Performed by: INTERNAL MEDICINE

## 2024-07-11 PROCEDURE — 99213 OFFICE O/P EST LOW 20 MIN: CPT | Performed by: INTERNAL MEDICINE

## 2024-07-11 PROCEDURE — 1123F ACP DISCUSS/DSCN MKR DOCD: CPT | Performed by: INTERNAL MEDICINE

## 2024-07-11 NOTE — PROGRESS NOTES
Consent:  Verbal consent was requested and obtained from patient on this date for a telehealth visit.    Patient ID: Sarah Chavez is a 73 y.o. female.    Subjective    HPI  A telephone visit (audio only) between the patient at home and the provider at [facility] [place] was utilized to provide this   telehealth service.     Ms. Sarah Chavez is a 74 y/o F who presents for follow-up for esophageal cancer. Currently on surveillance. Recent CT chest without any new findings. Still having significant dysphagia. No new fevers, chills, pain, new bump/lumps.    Patient's past medical history, surgical history, family history and social history reviewed.  Performance Status:  Symptomatic; fully ambulatory    Labs/Imaging/Pathology: personally reviewed reports and images in Epic electronic medical record system. Pertinent results as it related to the plan represented in below in assessment and plan.   Assessment/Plan    1. GE junction adenocarcinoma, ypT2N1  - Initially presented to GI  1/10/2022 with epigastric pain and dysphasia, weight loss. Patient  underwent an EGD on 1/21/2022 with Dr. Lora and was found to have a malignant esophageal tumor at the GE junction . An upper EUS with Dr. Delgadillo  on 2/3/2022 found to have a malignant tumor in the lower esophagus, at the GE  junction and extending into the cardia. Pathology returned as adenocarcinoma stage T3N0 by endosonographic criteria.   - 3/2/2022 - Started FOLFOX, completed 3 cycles, C2 onward 80% paclitaxel due to neuropathy.  - 4/8/2022 - PET/CT per [facility] 72050 with great response.   - 4/18/2022 - Started on RT with FOLFOX on 4/19/2022.  - C5 skipped due to thrombocytopenia.  - 5/11/2022 - Last cycle of FOLFOX.  - 6/27/2022 - Post CRT PET with good response.  - 7/20/2022 Status-post esophagectomy, final pathology revealed an 0.5 cm residual foci of poorly differentiated adenocarcinoma along with 1/15 lymph nodes with metastatic  carcinoma.  Final stage ypT2 N1.  - Discussed the role of adjuvant nivolumab per Checkmate 577 which showed an increase in disease-free survival when nivolumab was given every 2 weeks for 8 treatments followed by every 4 weeks to complete the year.   - Reviewed nivolumab and side effects. Patient was agreeable to treatment and consent signed.   - 8/2/23 completed one year of adjuvant nivolumab     8/2/23:  - No contraindications to proceed with last treatment of nivolumab today. She has completed 1 year of treatment.   - We discussed that she already has her next scans scheduled through Surgery.  - We discussed that she would like to keep her port in due to having poor IV access.  - We will plan for 6-week port flush and follow-up with me in 12 weeks.   - She will call us with any worsening symptoms.   - Her major compliant continues to be poor appetite and trouble eating. We will have repeat labs with immunotherapy in 6 weeks and I will see her again in 12 weeks.   - RTC with me in 12 weeks.      11/8/23:  - We will plan for a port flush today and blood work.   - She is planned for an ultrasound and follow-up with Dr. Poon tomorrow.  - She did have a new rash on her toes concerning for bullous pemphigoid versus a hypersensitivity that resolved with steroids. We discussed if those symptoms come back to call us and we can get her in with Dermatology   as she may need steroids for a longer period of time.  - We will plan to check an ACTH and TSH with labs today. If unremarkable, we will plan for follow-up in 3 months with a CT chest prior along with CBC, CMP, ACTH and TSH.   - We will plan for another Signatera in 3 months.   - We will plan for a port flush in 8 weeks.  - Due to traveling her follow up and port flushes will be stretched out to allow for travel to [place] in February 2024.   - RTC with me in March 2024.       3/21/24:  - Reviewed most recent blood work and CT scans. No evidence of recurrence.   - At this time,  she would like to have her port removed which we will help arrange.   - Otherwise, we will plan for follow-up in 3 months with labs and likely plan for imaging in 6 months with CT chest.   - RTC with Hector in 3 months and with me in 6 months.      6/21/24:  - Patient is describing some satiety and trouble with eating.   - We discussed he plan for a CT of the chest to rule out progression or recurrence of disease.  - Given she is having trouble swallowing and some neuropathy and nerve changes in her right shoulder and neck we will plan for a CT of the neck as well  - will reach out to Dr Lora regarding repeat EGD with her new dysphagia  - RTC in 2 weeks with phone visit.    7/11/24: Telephone visit  - CT chest without new findings to suggest recurrence  - dicussed with Dr Lora who will help expedite EGD for dysphagia  - she will call with worsening symptoms  - RTC In 3 mo or sooner if new findings on EGD        Reviewed ongoing medical problems and how they relate to her GE junction adenocarcinoma, will continue long term monitoring.     RTC in 3 mo or sooner with new symptoms . This note has been transcribed using a medical scribe and there is a possibility of unintentional typing misprints.     Diagnoses and all orders for this visit:  Adenocarcinoma of gastroesophageal junction (Multi)  -     Clinic Appointment Request; Future  -     Oncology Line Draw Appointment Request; Future  -     CBC and Auto Differential; Future  -     Comprehensive metabolic panel; Future       Renay Lau MD  Hematology/Oncology  [facility] [place] [facility]    Scribe Attestation  By signing my name below, Molly ROMERO Scribe attest that this documentation has been prepared under the direction and in the presence of Renay Lau MD.      20 min spent on this encounter

## 2024-07-12 DIAGNOSIS — C15.9 ESOPHAGEAL ADENOCARCINOMA (MULTI): Primary | ICD-10-CM

## 2024-07-12 DIAGNOSIS — R13.19 ESOPHAGEAL DYSPHAGIA: ICD-10-CM

## 2024-07-16 ENCOUNTER — TELEPHONE (OUTPATIENT)
Dept: GASTROENTEROLOGY | Facility: CLINIC | Age: 74
End: 2024-07-16
Payer: MEDICARE

## 2024-07-16 ENCOUNTER — TELEMEDICINE (OUTPATIENT)
Dept: SURGERY | Facility: CLINIC | Age: 74
End: 2024-07-16
Payer: MEDICARE

## 2024-07-16 DIAGNOSIS — C15.5 CANCER OF ABDOMINAL ESOPHAGUS (MULTI): ICD-10-CM

## 2024-07-16 DIAGNOSIS — R13.19 ESOPHAGEAL DYSPHAGIA: Primary | ICD-10-CM

## 2024-07-16 PROCEDURE — 1123F ACP DISCUSS/DSCN MKR DOCD: CPT | Performed by: STUDENT IN AN ORGANIZED HEALTH CARE EDUCATION/TRAINING PROGRAM

## 2024-07-16 PROCEDURE — 99443 PR PHYS/QHP TELEPHONE EVALUATION 21-30 MIN: CPT | Performed by: STUDENT IN AN ORGANIZED HEALTH CARE EDUCATION/TRAINING PROGRAM

## 2024-07-16 PROCEDURE — 1160F RVW MEDS BY RX/DR IN RCRD: CPT | Performed by: STUDENT IN AN ORGANIZED HEALTH CARE EDUCATION/TRAINING PROGRAM

## 2024-07-16 PROCEDURE — 1157F ADVNC CARE PLAN IN RCRD: CPT | Performed by: STUDENT IN AN ORGANIZED HEALTH CARE EDUCATION/TRAINING PROGRAM

## 2024-07-16 PROCEDURE — 1159F MED LIST DOCD IN RCRD: CPT | Performed by: STUDENT IN AN ORGANIZED HEALTH CARE EDUCATION/TRAINING PROGRAM

## 2024-07-16 NOTE — TELEPHONE ENCOUNTER
Patient was scheduled for New Patient visit 9/5 with Dr. Cole. Patient is a current patient of Dr. Anup Lora having last seen him 1/22.   Patient should have been scheduled as follow up with Dr. Lora.

## 2024-07-20 NOTE — PROGRESS NOTES
C/C:  Follow up visit    History Of Present Illness  Sarah Chavez is a 73 y.o. female presenting for virtual follow up with surveillance imaging given her h/o esophageal cancer. She is now s/p minimally invasive Kobi Navdeep esophagectomy and jejunostomy tube placement on 7/20/2022. She has completed 1 year of adjuvant Nivolumab with Dr. Lau     Since our last visit she has been doing ok - has been having more dysphagia and is set up for a scope with GI next week for possible dilation. Denies regurgitation. Weight is stable.      By way of review: patient was seen in January 2022 for odynophagia/dysphagia and GERD. She reports having intermittent heartburn, especially at night for the past 4-5 years but this became significantly worse at the end of last year around the time of the holidays. She had lost ~10 pounds since these symptoms started. She had minimal response to BID PPI therapy and she underwent an EGD in January with Dr. Lora which shoed a distal esophageal mass confirmed adenocarcinoma. She had a f/up EUS with Dr. Delgadillo showing at least a T3N0 disease. She is now s/p CROSS regiment neoadj chemoXRT which she finished 5/25. She had notable weight loss during her treatment but did not wish to pursue a feeding tube. Patient states she just started to feel better in terms of her appetite and energy level about 2 weeks ago. Weight has stabilized, no recent gain.      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 Of note she did develop a PE several months after this surgery        Past Medical History  She has a past medical history of Abdominal wall pain, Abnormal EKG, Anxiety, Bilateral kidney stones, Crohn's disease (Multi), Dysphagia, GERD (gastroesophageal reflux disease), History of blood transfusion, History of esophageal cancer, History of goiter, History of mammogram (04/18/2023), chronic pancreatitis, Pulmonary embolism (Multi), Seasonal  allergies, Thyrotoxicosis, and Wears reading eyeglasses.    Social History  She reports that she quit smoking about 11 years ago. Her smoking use included cigarettes. She has never been exposed to tobacco smoke. She has never used smokeless tobacco. She reports that she does not currently use alcohol. She reports that she does not use drugs.      Medications    Current Outpatient Medications:     ALPRAZolam (Xanax) 0.25 mg tablet, Take 1 tablet (0.25 mg) by mouth 3 times a day as needed for anxiety or sleep., Disp: 40 tablet, Rfl: 1    apple cider vinegar 300 mg tablet, TAKE AS DIRECTED., Disp: , Rfl:     ascorbic acid (Vitamin C) 1,000 mg tablet, Take 1 tablet (1,000 mg) by mouth 2 times a day., Disp: , Rfl:     b complex vitamins capsule, Take 1 capsule by mouth once daily., Disp: , Rfl:     BACILLUS COAGULANS-INULIN ORAL, Take 1 tablet by mouth 1 (one) time each day., Disp: , Rfl:     cholecalciferol (Vitamin D-3) 125 MCG (5000 UT) capsule, Take 1 capsule (125 mcg) by mouth once daily., Disp: , Rfl:     echinacea 400 mg capsule, Take 1 capsule by mouth 1 (one) time each day., Disp: , Rfl:     ferrous sulfate 325 (65 Fe) MG tablet, Take 1 tablet by mouth once daily with breakfast., Disp: , Rfl:     fish oil concentrate (Omega-3) 120-180 mg capsule, Take 1 capsule (1 g) by mouth once daily., Disp: , Rfl:     ketoconazole (NIZOral) 2 % cream, APPLY SPARINGLY TO AFFECTED AREA(S) ONCE DAILY, Disp: , Rfl:     magnesium 250 mg tablet, Take 1 tablet (250 mg) by mouth once daily., Disp: , Rfl:     melatonin 5 mg capsule, Take 1 capsule (5 mg) by mouth once daily at bedtime., Disp: , Rfl:     omeprazole (PriLOSEC) 40 mg DR capsule, Take 1 capsule (40 mg) by mouth once daily., Disp: , Rfl:     potassium gluconate 595 mg (99 mg) tablet extended release, Take 1 tablet by mouth 1 (one) time each day., Disp: , Rfl:     psyllium (Metamucil) 0.4 gram capsule, Take as directed, Disp: , Rfl:     rosuvastatin (Crestor) 5 mg tablet,  Take 1 tablet (5 mg) by mouth once daily., Disp: 90 tablet, Rfl: 3    sertraline (Zoloft) 100 mg tablet, TAKE 1 TABLET BY MOUTH EVERY DAY (Patient not taking: Reported on 7/19/2024), Disp: 30 tablet, Rfl: 5    traMADol (Ultram) 50 mg tablet, Take 1 tablet (50 mg) by mouth 4 times a day as needed., Disp: , Rfl:     turmeric root extract 500 mg tablet, Take 1 tablet by mouth 1 (one) time each day., Disp: , Rfl:     Allergies  Statins-hmg-coa reductase inhibitors    Review of Systems:  Review of Systems   Constitutional: No fevers, chills, unexpected weight change  HENT: No sore throat, congestion, or nasal drainage  Eyes: No visual changes or eye itching  Respiratory:  No cough, worsening dyspnea, wheezing  Cardiac: No chest pain, palpitations, or lower extremity edema  Gastrointestinal: No nausea, vomiting, diarrhea. No abdominal pain  Genitourinary: No dysuria or hematuria  Musculoskeletal: No back pain. No significant myalgias or arthralgias  Neurologic: No headaches, dizziness, or seizures.  Hematologic: No east bleeding or bruising.  Psychiatric: No anxiety or depression.    Physical Exam:  Physical Exam  LMP 01/01/1987 (Approximate)   Vital signs stable  Psychiatric:         Mood and Affect: Mood normal.       Relevant Results:     Pathology:  Accession #: [id number]            Pathologist:                   KRISHNA MEEK M.D.  Date of Procedure:    7/20/2022  Date Received:          7/20/2022  Date Reported           8/5/2022  Submitting Physician:   SAGAR DOWNS DO  [facility]  Other External #                                                                   FINAL DIAGNOSIS  A. DISTAL ESOPHAGUS AND PROXIMAL STOMACH, ESOPHAGOGASTRECTOMY:  -- RESIDUAL MICROSCOPIC FOCI OF POORLY DIFFERENTIATED ADENOCARCINOMA (LARGEST  FOCUS: 0.5 CM) OF THE ESOPHAGOGASTRIC JUNCTION, INVADING INTO THE MUSCULARIS  [place].  -- STATUS POST NEOADJUVANT THERAPY WITH PARTIAL TREATMENT RESPONSE (CAP  TUMOR  REGRESSION SCORE: 2).  -- FOCAL LYMPHOVASCULAR INVASION IS IDENTIFIED.  -- PERINEURAL INVASION IS NOT IDENTIFIED.  -- ALL SURGICAL RESECTION MARGINS ARE NEGATIVE.  -- METASTATIC CARCINOMA IN ONE OF FIFTEEN LYMPH NODES (1/15).  -- PATHOLOGIC STAGE (AJCC 8TH EDITION): ypT2 N1 (see synoptic report).  -- STOMACH WITH MILD CHRONIC INACTIVE GASTRITIS; NO HELICOBACTER IS SEEN.    B. STOMACH, ADDITIONAL MARGINS, EXCISION:  -- FULL THICKNESS SEGMENT OF STOMACH WITH NO SIGNIFICANT ABNORMALITY.  -- NEGATIVE FOR TUMOR.    C. ESOPHAGUS AND STOMACH, ANASTOMOTIC DONUTS, EXCISION:    -- FULL THICKNESS SEGMENTS OF ESOPHAGUS AND STOMACH WITH NO SIGNIFICANT  ABNORMALITY.  -- NEGATIVE FOR TUMOR.    Comment: Immunohistochemical stains performed on block A17 demonstrate that the  tumor cells are focally positive for CDX2. Chromogranin is negative, while  INSM1 shows focal non-specific staining. RB1 shows preserved nuclear  expression. Mucicarmine shows no definite intracytoplasmic mucin in tumor  cells.                                                                                                                                             CASE SUMMARY REPORT       SPECIMEN  Procedure:      Esophagogastrectomy  TUMOR  Tumor Site:      Esophagogastric junction (EGJ)  Relationship of Tumor to Esophagogastric Junction:       Tumor midpoint is located at the esophagogastric junction  Histologic Type:      Adenocarcinoma  Histologic Grade:      G3, poorly differentiated, undifferentiated  Tumor Size:      Greatest Dimension (Centimeters): 0.5 cm  Tumor Extent:      Invades muscularis propria  Treatment Effect:      Present, with residual cancer showing evident tumor  regression, but more than single cells or rare small groups of cancer cells  (partial response, score 2)  Lymphovascular Invasion:      Present  Perineural Invasion:      Not identified  MARGINS  Margin Status for Invasive Carcinoma:      All margins negative for  invasive  carcinoma   Closest Margin(s) to Invasive Carcinoma:        Proximal   Distance from Invasive Carcinoma to Closest Margin  :          4.9 cm  Margin Status for Dysplasia and Intestinal Metaplasia:       All margins negative for dysplasia  REGIONAL LYMPH NODES  Regional Lymph Node Status:        Tumor present in regional lymph node(s)     Number of Lymph Nodes with Tumor:           1   Number of Lymph Nodes Examined:         15  PATHOLOGIC STAGE CLASSIFICATION (pTNM, AJCC 8th Edition)  TNM Descriptors:      y (post-treatment)  pT Category:      pT2  pN Category:      pN1     Imaging:    === 07/08/24 ===    CT SOFT TISSUE NECK W IV CONTRAST    - Impression -  No evidence of significant cervical adenopathy or a soft tissue mass  in the neck.    The visualized thoracic esophagus is patulous, nonspecific.    The right thyroid lobe is not visualized and may be surgically absent.    Please see CT chest performed the same day for chest findings.      MACRO:  None    Signed by: Lisa Monge 7/8/2024 4:45 PM  Dictation workstation:   [id number]     CT Chest was personally reviewed     Assessment/Plan   Diagnoses and all orders for this visit:  Esophageal dysphagia  Cancer of abdominal esophagus (Multi)         Sarah Chavez is a 73 y.o. female with distal esophageal adenocarcinoma s/p CROSS regimen, completed 5/25 and s/p minimally invasive Kobi Navdeep esophagectomy and jejunostomy tube placement on 7/20/2022. Her pathology did show ypT2N1 disease (negative margins, small residual tumor, 1/15 lymph nodes involved) and she completed 1 year adjuvant Nivolumab with Dr. Lau. Recent CT C/A/P shows ROJELIO. She is getting EGD, possible dilation with GI next week. Will plan for repeat scan in 6 months if Dr. Lau agrees and VV at that time.       Sylvia Poon, DO  Thoracic & Esophageal Surgery

## 2024-07-23 ENCOUNTER — HOSPITAL ENCOUNTER (OUTPATIENT)
Dept: GASTROENTEROLOGY | Facility: HOSPITAL | Age: 74
Discharge: HOME | End: 2024-07-23
Payer: MEDICARE

## 2024-07-23 ENCOUNTER — ANESTHESIA (OUTPATIENT)
Dept: GASTROENTEROLOGY | Facility: HOSPITAL | Age: 74
End: 2024-07-23
Payer: MEDICARE

## 2024-07-23 ENCOUNTER — ANESTHESIA EVENT (OUTPATIENT)
Dept: GASTROENTEROLOGY | Facility: HOSPITAL | Age: 74
End: 2024-07-23
Payer: MEDICARE

## 2024-07-23 VITALS
DIASTOLIC BLOOD PRESSURE: 68 MMHG | BODY MASS INDEX: 19.72 KG/M2 | WEIGHT: 115.52 LBS | HEART RATE: 60 BPM | SYSTOLIC BLOOD PRESSURE: 152 MMHG | RESPIRATION RATE: 16 BRPM | OXYGEN SATURATION: 97 % | TEMPERATURE: 97 F | HEIGHT: 64 IN

## 2024-07-23 DIAGNOSIS — C15.9 ESOPHAGEAL ADENOCARCINOMA (MULTI): ICD-10-CM

## 2024-07-23 DIAGNOSIS — R13.19 ESOPHAGEAL DYSPHAGIA: ICD-10-CM

## 2024-07-23 PROCEDURE — 7100000009 HC PHASE TWO TIME - INITIAL BASE CHARGE

## 2024-07-23 PROCEDURE — 43235 EGD DIAGNOSTIC BRUSH WASH: CPT | Performed by: INTERNAL MEDICINE

## 2024-07-23 PROCEDURE — 2500000004 HC RX 250 GENERAL PHARMACY W/ HCPCS (ALT 636 FOR OP/ED): Performed by: NURSE ANESTHETIST, CERTIFIED REGISTERED

## 2024-07-23 PROCEDURE — 3700000002 HC GENERAL ANESTHESIA TIME - EACH INCREMENTAL 1 MINUTE

## 2024-07-23 PROCEDURE — 2500000004 HC RX 250 GENERAL PHARMACY W/ HCPCS (ALT 636 FOR OP/ED): Performed by: ANESTHESIOLOGY

## 2024-07-23 PROCEDURE — 3700000001 HC GENERAL ANESTHESIA TIME - INITIAL BASE CHARGE

## 2024-07-23 PROCEDURE — 7100000010 HC PHASE TWO TIME - EACH INCREMENTAL 1 MINUTE

## 2024-07-23 RX ORDER — PROPOFOL 10 MG/ML
INJECTION, EMULSION INTRAVENOUS AS NEEDED
Status: DISCONTINUED | OUTPATIENT
Start: 2024-07-23 | End: 2024-07-23

## 2024-07-23 RX ORDER — SODIUM CHLORIDE, SODIUM LACTATE, POTASSIUM CHLORIDE, CALCIUM CHLORIDE 600; 310; 30; 20 MG/100ML; MG/100ML; MG/100ML; MG/100ML
50 INJECTION, SOLUTION INTRAVENOUS CONTINUOUS
Status: DISCONTINUED | OUTPATIENT
Start: 2024-07-23 | End: 2024-07-24 | Stop reason: HOSPADM

## 2024-07-23 RX ORDER — BISMUTH SUBSALICYLATE 525 MG/30ML
15 LIQUID ORAL EVERY 6 HOURS PRN
COMMUNITY

## 2024-07-23 SDOH — HEALTH STABILITY: MENTAL HEALTH: CURRENT SMOKER: 0

## 2024-07-23 ASSESSMENT — PAIN - FUNCTIONAL ASSESSMENT
PAIN_FUNCTIONAL_ASSESSMENT: 0-10

## 2024-07-23 ASSESSMENT — COLUMBIA-SUICIDE SEVERITY RATING SCALE - C-SSRS
2. HAVE YOU ACTUALLY HAD ANY THOUGHTS OF KILLING YOURSELF?: NO
1. IN THE PAST MONTH, HAVE YOU WISHED YOU WERE DEAD OR WISHED YOU COULD GO TO SLEEP AND NOT WAKE UP?: NO
6. HAVE YOU EVER DONE ANYTHING, STARTED TO DO ANYTHING, OR PREPARED TO DO ANYTHING TO END YOUR LIFE?: NO

## 2024-07-23 ASSESSMENT — PAIN SCALES - GENERAL
PAINLEVEL_OUTOF10: 0 - NO PAIN
PAIN_LEVEL: 0
PAINLEVEL_OUTOF10: 0 - NO PAIN

## 2024-07-23 NOTE — Clinical Note
Huddle and Timeout completed together with team. Patient wristband and LUIS information verified.  Anesthesia safety check completed

## 2024-07-23 NOTE — H&P
Outpatient Hospital Procedure H&P    Patient Profile-Procedures  Initial Info  Patient Demographics  Name Sarah Chavez  Date of Birth 1950  MRN [id number]  Address   [address] [place] [id number] [facility] [id number]    Primary Phone Number [phone]  Secondary Phone Number    PCP Sima Miner    Procedure(s):  EGD with possible dilation  Primary contact name and number   Extended Emergency Contact Information  Primary Emergency Contact: Phan Chavez  Home Phone: [phone]  Mobile Phone: [phone]  Relation: Spouse  Secondary Emergency Contact: RGSHONNA  Home Phone: [phone]  Mobile Phone: [phone]  Relation: Other  Preferred language: English   needed? No    General Health  Weight   Vitals:    07/23/24 1238   Weight: 52.4 kg (115 lb 8.3 oz)     BMI Body mass index is 19.83 kg/m².    Allergies  Allergies   Allergen Reactions    Statins-Hmg-Coa Reductase Inhibitors Myalgia       Past Medical History   Past Medical History:   Diagnosis Date    Abdominal wall pain     Abnormal EKG     Anxiety     Bilateral kidney stones     Crohn's disease (Multi)     Dysphagia     GERD (gastroesophageal reflux disease)     History of blood transfusion     History of esophageal cancer     History of goiter     History of mammogram 04/18/2023    cat 2    Hx of chronic pancreatitis     Pulmonary embolism (Multi)     History Of    Seasonal allergies     Thyrotoxicosis     Wears reading eyeglasses        Provider assessment  Diagnosis: dysphagia     Medication Reviewed - yes  Prior to Admission medications    Medication Sig Start Date End Date Taking? Authorizing Provider   ALPRAZolam (Xanax) 0.25 mg tablet Take 1 tablet (0.25 mg) by mouth 3 times a day as needed for anxiety or sleep. 6/19/24   Sima Miner MD   apple cider vinegar 300 mg tablet TAKE AS DIRECTED.    Historical Provider, MD   ascorbic acid (Vitamin C) 1,000 mg tablet  Take 1 tablet (1,000 mg) by mouth 2 times a day.    Historical Provider, MD   b complex vitamins capsule Take 1 capsule by mouth once daily.    Historical Provider, MD   BACILLUS COAGULANS-INULIN ORAL Take 1 tablet by mouth 1 (one) time each day.    Historical Provider, MD   cholecalciferol (Vitamin D-3) 125 MCG (5000 UT) capsule Take 1 capsule (125 mcg) by mouth once daily.    Historical Provider, MD   echinacea 400 mg capsule Take 1 capsule by mouth 1 (one) time each day.    Historical Provider, MD   ferrous sulfate 325 (65 Fe) MG tablet Take 1 tablet by mouth once daily with breakfast.    Historical Provider, MD   fish oil concentrate (Omega-3) 120-180 mg capsule Take 1 capsule (1 g) by mouth once daily.    Historical Provider, MD   ketoconazole (NIZOral) 2 % cream APPLY SPARINGLY TO AFFECTED AREA(S) ONCE DAILY 8/28/19   Historical Provider, MD   magnesium 250 mg tablet Take 1 tablet (250 mg) by mouth once daily. 3/11/20   Historical Provider, MD   melatonin 5 mg capsule Take 1 capsule (5 mg) by mouth once daily at bedtime.    Historical Provider, MD   omeprazole (PriLOSEC) 40 mg DR capsule Take 1 capsule (40 mg) by mouth once daily. 5/18/23   Historical Provider, MD   potassium gluconate 595 mg (99 mg) tablet extended release Take 1 tablet by mouth 1 (one) time each day.    Historical Provider, MD   psyllium (Metamucil) 0.4 gram capsule Take as directed    Historical Provider, MD   rosuvastatin (Crestor) 5 mg tablet Take 1 tablet (5 mg) by mouth once daily. 6/19/24 6/14/25  Sima Miner MD   sertraline (Zoloft) 100 mg tablet TAKE 1 TABLET BY MOUTH EVERY DAY  Patient not taking: Reported on 7/19/2024 3/17/23   Sima Miner MD   traMADol (Ultram) 50 mg tablet Take 1 tablet (50 mg) by mouth 4 times a day as needed. 4/8/19   Historical Provider, MD   turmeric root extract 500 mg tablet Take 1 tablet by mouth 1 (one) time each day.    Historical Provider, MD       Physical Exam  Vitals:    07/23/24  1238   BP: 130/71   Pulse: 66   Resp: 16   Temp: 36.1 °C (97 °F)   SpO2: 97%        General: A&Ox3, NAD.  CV: RRR. No murmur.  Resp: CTA bilaterally. No wheezing, rhonchi or rales.   Extrem: No edema.       Oropharyngeal Classification II (hard and soft palate, upper portion of tonsils and uvula visible)  ASA PS Classification 3  Sedation Plan Deep  Procedure Plan - pre-procedural (re)assesment completed by physician:  discharge/transfer patient when discharge criteria met    Anup Lora MD  7/23/2024 12:48 PM

## 2024-07-23 NOTE — ANESTHESIA PREPROCEDURE EVALUATION
Patient: Sarah Chavez    Procedure Information       Date/Time: 07/23/24 1340    Scheduled providers: Anup Lora MD; Julius Mantilla MD; Shea Serna, RN; Tamanna Gillis, RN    Procedure: EGD    Location: [facility]            Relevant Problems   Cardiac   (+) Athscl heart disease of native coronary artery w/o ang pctrs   (+) Mild aortic insufficiency      Pulmonary   (+) Pulmonary embolism (Multi)      Neuro   (+) Anxiety   (+) Depression, major, single episode, mild (CMS-HCC)      GI   (+) Cancer of abdominal esophagus (Multi)   (+) Crohn's disease (Multi)   (+) Esophageal adenocarcinoma (Multi)   (+) Esophageal dysphagia   (+) GERD (gastroesophageal reflux disease)      /Renal   (+) Urinary tract infection      Liver   (+) Cancer of abdominal esophagus (Multi)   (+) Chronic pancreatitis (Multi)   (+) Esophageal adenocarcinoma (Multi)      Endocrine   (+) Goiter   (+) Papillary thyroid carcinoma (Multi)      Musculoskeletal   (+) Rheumatoid arthritis (Multi)      HEENT   (+) Seasonal allergies      ID   (+) Candidal intertrigo   (+) Urinary tract infection       Clinical information reviewed:   Tobacco  Allergies  Meds   Med Hx  Surg Hx   Fam Hx  Soc Hx        NPO Detail:  NPO/Void Status  Carbohydrate Drink Given Prior to Surgery? : N  Date of Last Liquid: 07/22/24  Time of Last Liquid: 2200  Date of Last Solid: 07/22/24  Time of Last Solid: 1800  Last Intake Type: Clear fluids  Time of Last Void: 1100         Physical Exam    Airway  Mallampati: II     Cardiovascular - normal exam     Dental - normal exam     Pulmonary - normal exam     Abdominal - normal exam         Anesthesia Plan    History of general anesthesia?: yes  History of complications of general anesthesia?: no    ASA 3     MAC     The patient is not a current smoker.  Patient was not previously instructed to abstain from smoking on day of procedure.  Patient did not smoke on day of procedure.    intravenous  induction   Anesthetic plan and risks discussed with patient.  Use of blood products discussed with patient who.

## 2024-07-23 NOTE — Clinical Note
Patient tolerated procedure well. Appears comfortable with no complaints of pain. VS stable. Arousable prior to transport. Patient transported to [facility] via cart.  Report called              . Handoff completed

## 2024-07-23 NOTE — ANESTHESIA POSTPROCEDURE EVALUATION
Patient: Sarah Chavez    Procedure Summary       Date: 07/23/24 Room / Location: [facility]    Anesthesia Start: 1326 Anesthesia Stop: 1343    Procedure: EGD Diagnosis:       Esophageal adenocarcinoma (Multi)      Esophageal dysphagia    Scheduled Providers: Anup Lora MD; Julius Mantilla MD; Shea Serna RN; Tamanna Gillis RN Responsible Provider: Julius Mantilla MD    Anesthesia Type: MAC ASA Status: 3            Anesthesia Type: MAC    Vitals Value Taken Time   /62 07/23/24 1343   Temp 36.5 07/23/24 1343   Pulse 56 07/23/24 1343   Resp 18 07/23/24 1343   SpO2 99 07/23/24 1343       Anesthesia Post Evaluation    Patient location during evaluation: bedside  Patient participation: complete - patient participated  Level of consciousness: awake and alert  Pain score: 0  Pain management: adequate  Airway patency: patent  Cardiovascular status: acceptable and stable  Respiratory status: acceptable and room air  Hydration status: acceptable  Postoperative Nausea and Vomiting: none      No notable events documented.

## 2024-07-23 NOTE — DISCHARGE INSTRUCTIONS
Moderate Sedation in Adults Discharge Instructions    About this topic  Moderate sedation is also known as conscious sedation. It changes your state of being awake or consciousness. With this sedation, you may feel slight pain or pressure during a procedure. The drugs help you to relax and may even allow you to sleep. It will be easy to wake you and you may talk and answer questions while under sedation. Most likely, you will not remember what happens while under this sedation.  What care is needed at home?  Ask your doctor what you need to do when you go home. Make sure you understand everything the doctor says. This way you will know what you need to do.  You will not be allowed to drive right away after the procedure. Ask a family member or a friend to drive you home.  Do not operate heavy or dangerous machinery for at least 24 hours.  Do not make major decisions or sign important papers for at least 24 hours. You may not be thinking clearly.  Avoid beer, wine, or mixed drinks (alcohol) for at least 24 hours.  You are at a higher risk of falling for at least 24 hours after moderate sedation.  Take extra care when you get up.  Do not change positions quickly.  Do not rush when you need to go to the bathroom or to answer the phone.  Ask for help if you feel unsteady when you try to walk.  Wear shoes with non-slip soles and low heels.  What follow-up care is needed?  Your doctor may ask you to make visits to the office to check on your progress. Be sure to keep these visits. Your doctor may also refer you to other doctors or tell you that you need more tests or care.  What drugs may be needed?  The doctor may order drugs to:  Help with pain  Treat an upset stomach or throwing up  Will physical activity be limited?  Rest for the day of the procedure. Avoid strenuous activities like heavy lifting and hard exercise. Talk to your doctor about whether you need to limit lifting or exercise after your procedure.  What  changes to diet are needed?  Start with a light diet when you are fully awake. This includes things that are easy to swallow like soups, pudding, jello, toast, and eggs. Slowly progress to your normal diet.  What problems could happen?  Low blood pressure  Breathing problems  Upset stomach or throwing up  Dizziness  When do I need to call the doctor?  Feel dizzy, weak, or tired  Faint  Very bad headache  Upset stomach or throwing up  To follow up for more tests or care  Teach Back: Helping You Understand  The Teach Back Method helps you understand the information we are giving you. After you talk with the staff, tell them in your own words what you learned. This helps to make sure the staff has described each thing clearly. It also helps to explain things that may have been confusing. Before going home, make sure you can do these:  I can tell you about my procedure.  I can tell you if I need more tests or care.  I can tell you what is good for me to eat and drink the next day.  I can tell you what I would do if I feel dizzy, weak, or tired.  Last Reviewed Date  2020-03-02     Upper GI Endoscopy Discharge Instructions    About this topic  This procedure is done to view your upper gastrointestinal (GI) tract. This includes your throat and food pipe (esophagus). It also includes your stomach and the first part of the small bowel. Some people have this test for problems like coughing or throwing up blood. Other people may be having bad belly pain or blood in their stool. You may be having trouble swallowing or problems with acid reflux.  Doctors often use this test to look for problems like:  Ulcers  Cancer or tumor growths  Internal bleeding  Swelling  Inflammation  Infection  Ochoa's esophagus  Gastroesophageal reflux disease or GERD  Swallowing problems    What care is needed at home?  Ask your doctor what you need to do when you go home. Make sure you ask questions if you do not understand what the doctor says.  This way you will know what you need to do.  Your throat may feel sore. Your belly may also feel bloated. Your doctor may give you drugs to help with pain.  Talk to your doctor about when it is safe for you to go back to eating your normal diet and taking your drugs. You can drink fluid once the numbing drugs in your throat wear off.  What follow-up care is needed?  Your doctor may ask you to make visits to the office to check on your progress. Be sure to keep these visits.  The results of this test may help your doctor understand what kind of problem you have with your upper GI tract. Together you can make a plan for more care.  What drugs may be needed?  The doctor may order drugs to:  Help with pain  Decrease the acid in your stomach  Will physical activity be limited?  You may need to limit your activity for the rest of the day. After that, you will likely be able to go back to your normal activities.  What changes to diet are needed?  Soft foods like pudding or soups may be easier to eat at first. Ask your doctor if you need to make any changes to your diet.  What problems could happen?  Painful swallowing  Upset stomach  Injury to food pipe  Throwing up  Tear in the esophagus  When do I need to call the doctor?  Signs of infection. These include a fever of 100.4°F (38°C) or higher, chills.  Very bad belly pain  Throwing up blood  Your belly is hard and swollen  Trouble swallowing or breathing  Upset stomach and throwing up  Bloody or black tarry stools  Cough, shortness of breath, or chest pain  A crunching feeling under the skin in your neck  You are not feeling better in 2 to 3 days or you are feeling worse  Teach Back: Helping You Understand  The Teach Back Method helps you understand the information we are giving you. After you talk with the staff, tell them in your own words what you learned. This helps to make sure the staff has described each thing clearly. It also helps to explain things that may have  been confusing. Before going home, make sure you can do these:  I can tell you about my procedure.  I can tell you what changes I need to make with my diet or drugs.  I can tell you what I will do if I have very bad belly pain, throwing up blood, or my belly is hard and swollen.  Where can I learn more?  [employer]  https://www.gastro.org/practice-guidance/gi-patient-center/topic/upper-gi-endoscopy  Last Reviewed Date

## 2024-08-02 LAB
LABORATORY COMMENT REPORT: NORMAL
PATH REPORT.COMMENTS IMP SPEC: NORMAL
PATH REPORT.FINAL DX SPEC: NORMAL
PATH REPORT.GROSS SPEC: NORMAL
PATH REPORT.TOTAL CANCER: NORMAL

## 2024-08-06 LAB
LABORATORY COMMENT REPORT: NORMAL
PATH REPORT.ADDENDUM SPEC: NORMAL
PATH REPORT.COMMENTS IMP SPEC: NORMAL
PATH REPORT.FINAL DX SPEC: NORMAL
PATH REPORT.GROSS SPEC: NORMAL
PATH REPORT.TOTAL CANCER: NORMAL

## 2024-09-05 ENCOUNTER — APPOINTMENT (OUTPATIENT)
Dept: GASTROENTEROLOGY | Facility: CLINIC | Age: 74
End: 2024-09-05
Payer: MEDICARE

## 2024-10-07 DIAGNOSIS — F41.9 ANXIETY: ICD-10-CM

## 2024-10-07 RX ORDER — ALPRAZOLAM 0.25 MG/1
0.25 TABLET ORAL 3 TIMES DAILY PRN
Qty: 40 TABLET | Refills: 1 | Status: SHIPPED | OUTPATIENT
Start: 2024-10-07

## 2024-10-17 ENCOUNTER — APPOINTMENT (OUTPATIENT)
Dept: HEMATOLOGY/ONCOLOGY | Facility: CLINIC | Age: 74
End: 2024-10-17
Payer: MEDICARE

## 2024-11-11 ENCOUNTER — LAB (OUTPATIENT)
Dept: LAB | Facility: LAB | Age: 74
End: 2024-11-11
Payer: MEDICARE

## 2024-11-11 DIAGNOSIS — C16.0 ADENOCARCINOMA OF GASTROESOPHAGEAL JUNCTION (MULTI): ICD-10-CM

## 2024-11-11 LAB
ALBUMIN SERPL BCP-MCNC: 3.7 G/DL (ref 3.4–5)
ALP SERPL-CCNC: 88 U/L (ref 33–136)
ALT SERPL W P-5'-P-CCNC: 43 U/L (ref 7–45)
ANION GAP SERPL CALC-SCNC: 7 MMOL/L (ref 10–20)
AST SERPL W P-5'-P-CCNC: 53 U/L (ref 9–39)
BASOPHILS # BLD AUTO: 0.12 X10*3/UL (ref 0–0.1)
BASOPHILS NFR BLD AUTO: 1.7 %
BILIRUB SERPL-MCNC: 0.6 MG/DL (ref 0–1.2)
BUN SERPL-MCNC: 9 MG/DL (ref 6–23)
CALCIUM SERPL-MCNC: 8.9 MG/DL (ref 8.6–10.3)
CHLORIDE SERPL-SCNC: 106 MMOL/L (ref 98–107)
CO2 SERPL-SCNC: 34 MMOL/L (ref 21–32)
CREAT SERPL-MCNC: 0.74 MG/DL (ref 0.5–1.05)
EGFRCR SERPLBLD CKD-EPI 2021: 86 ML/MIN/1.73M*2
EOSINOPHIL # BLD AUTO: 0.44 X10*3/UL (ref 0–0.4)
EOSINOPHIL NFR BLD AUTO: 6.2 %
ERYTHROCYTE [DISTWIDTH] IN BLOOD BY AUTOMATED COUNT: 14.1 % (ref 11.5–14.5)
GLUCOSE SERPL-MCNC: 114 MG/DL (ref 74–99)
HCT VFR BLD AUTO: 42.7 % (ref 36–46)
HGB BLD-MCNC: 14.2 G/DL (ref 12–16)
IMM GRANULOCYTES # BLD AUTO: 0.01 X10*3/UL (ref 0–0.5)
IMM GRANULOCYTES NFR BLD AUTO: 0.1 % (ref 0–0.9)
LYMPHOCYTES # BLD AUTO: 3.01 X10*3/UL (ref 0.8–3)
LYMPHOCYTES NFR BLD AUTO: 42.2 %
MCH RBC QN AUTO: 33.7 PG (ref 26–34)
MCHC RBC AUTO-ENTMCNC: 33.3 G/DL (ref 32–36)
MCV RBC AUTO: 101 FL (ref 80–100)
MONOCYTES # BLD AUTO: 0.89 X10*3/UL (ref 0.05–0.8)
MONOCYTES NFR BLD AUTO: 12.5 %
NEUTROPHILS # BLD AUTO: 2.66 X10*3/UL (ref 1.6–5.5)
NEUTROPHILS NFR BLD AUTO: 37.3 %
NRBC BLD-RTO: 0 /100 WBCS (ref 0–0)
PLATELET # BLD AUTO: 255 X10*3/UL (ref 150–450)
POTASSIUM SERPL-SCNC: 4.5 MMOL/L (ref 3.5–5.3)
PROT SERPL-MCNC: 6 G/DL (ref 6.4–8.2)
RBC # BLD AUTO: 4.21 X10*6/UL (ref 4–5.2)
SODIUM SERPL-SCNC: 142 MMOL/L (ref 136–145)
WBC # BLD AUTO: 7.1 X10*3/UL (ref 4.4–11.3)

## 2024-11-11 PROCEDURE — 80053 COMPREHEN METABOLIC PANEL: CPT

## 2024-11-11 PROCEDURE — 85025 COMPLETE CBC W/AUTO DIFF WBC: CPT

## 2024-11-11 PROCEDURE — 36415 COLL VENOUS BLD VENIPUNCTURE: CPT

## 2024-11-14 ENCOUNTER — OFFICE VISIT (OUTPATIENT)
Dept: HEMATOLOGY/ONCOLOGY | Facility: CLINIC | Age: 74
End: 2024-11-14
Payer: MEDICARE

## 2024-11-14 VITALS
OXYGEN SATURATION: 92 % | TEMPERATURE: 96.4 F | HEART RATE: 83 BPM | DIASTOLIC BLOOD PRESSURE: 87 MMHG | WEIGHT: 120.15 LBS | HEIGHT: 61 IN | RESPIRATION RATE: 18 BRPM | BODY MASS INDEX: 22.68 KG/M2 | SYSTOLIC BLOOD PRESSURE: 137 MMHG

## 2024-11-14 DIAGNOSIS — C15.9 ESOPHAGEAL ADENOCARCINOMA (MULTI): ICD-10-CM

## 2024-11-14 DIAGNOSIS — C16.0 ADENOCARCINOMA OF GASTROESOPHAGEAL JUNCTION (MULTI): ICD-10-CM

## 2024-11-14 DIAGNOSIS — K21.9 GASTROESOPHAGEAL REFLUX DISEASE WITHOUT ESOPHAGITIS: Primary | ICD-10-CM

## 2024-11-14 PROCEDURE — 1123F ACP DISCUSS/DSCN MKR DOCD: CPT | Performed by: INTERNAL MEDICINE

## 2024-11-14 PROCEDURE — 1157F ADVNC CARE PLAN IN RCRD: CPT | Performed by: INTERNAL MEDICINE

## 2024-11-14 PROCEDURE — 99214 OFFICE O/P EST MOD 30 MIN: CPT | Performed by: INTERNAL MEDICINE

## 2024-11-14 PROCEDURE — 1126F AMNT PAIN NOTED NONE PRSNT: CPT | Performed by: INTERNAL MEDICINE

## 2024-11-14 PROCEDURE — 1159F MED LIST DOCD IN RCRD: CPT | Performed by: INTERNAL MEDICINE

## 2024-11-14 PROCEDURE — 3008F BODY MASS INDEX DOCD: CPT | Performed by: INTERNAL MEDICINE

## 2024-11-14 PROCEDURE — 99417 PROLNG OP E/M EACH 15 MIN: CPT | Performed by: INTERNAL MEDICINE

## 2024-11-14 RX ORDER — GABAPENTIN 100 MG/1
100 CAPSULE ORAL NIGHTLY
Qty: 60 CAPSULE | Refills: 11 | Status: SHIPPED | OUTPATIENT
Start: 2024-11-14 | End: 2025-11-14

## 2024-11-14 ASSESSMENT — PAIN SCALES - GENERAL: PAINLEVEL_OUTOF10: 0-NO PAIN

## 2024-11-14 NOTE — PROGRESS NOTES
Patient ID: Sarah Chavez is a 74 y.o. female.    Subjective    HPI  Ms. Sarah Chavez is a 75 y/o F who presents for follow-up for esophageal cancer.     Most recent EGD on 7/23/24 was unremarkable.    Blood work on 11/11/24 shows no cytopenias or electrolyte abnormalities.     Patient denies any new pain but continues to deal with the electric like right shoulder pain that she is waiting to see neurology for and has seen several different providers/specialist in the past and had different tests ran but denies ever trying medication to help. She is open to medication and states she is willing to try anything after dealing with this for the last 2 years. No troubles with edema of any kind. No rash or other skin issues. She was taking her PPI incorrectly and after she started doing as directed she is feeling much better. No other major complaints at this time.     Patient's past medical history, surgical history, family history and social history reviewed.    Objective      Vitals:    11/14/24 1112   BP: 137/87   Pulse: 83   Resp: 18   Temp: 35.8 °C (96.4 °F)   SpO2: 92%     LMP 01/01/1987 (Approximate)    Review of Systems:   Review of Systems:    Positive per HPI, otherwise negative.   Physical Exam  Gen: appears well in clinic, NAD  HEENT: atraumatic head, normocephalic, EOMI, conjunctiva normal  LUNG: no increased WOB, CTAB  CV: No JVD. RRR  GI: soft, NT, ND  LE: no LE edema  Skin: no obvious rashes or lesions on visible skin  Neuro: interactive, no focal deficits noted  Psych: normal mood and affect  Performance Status:  Symptomatic; fully ambulatory    Labs/Imaging/Pathology: personally reviewed reports and images in Epic electronic medical record system. Pertinent results as it related to the plan represented in below in assessment and plan.   Assessment/Plan   1. GE junction adenocarcinoma, ypT2N1  - Initially presented to GI  1/10/2022 with epigastric pain and dysphasia, weight loss. Patient   underwent an EGD on 1/21/2022 with Dr. Lora and was found to have a malignant esophageal tumor at the GE junction . An upper EUS with Dr. Delgadillo  on 2/3/2022 found to have a malignant tumor in the lower esophagus, at the GE  junction and extending into the cardia. Pathology returned as adenocarcinoma stage T3N0 by endosonographic criteria.   - 3/2/2022 - Started FOLFOX, completed 3 cycles, C2 onward 80% paclitaxel due to neuropathy.  - 4/8/2022 - PET/CT per [facility] 35565 with great response.   - 4/18/2022 - Started on RT with FOLFOX on 4/19/2022.  - C5 skipped due to thrombocytopenia.  - 5/11/2022 - Last cycle of FOLFOX.  - 6/27/2022 - Post CRT PET with good response.  - 7/20/2022 Status-post esophagectomy, final pathology revealed an 0.5 cm residual foci of poorly differentiated adenocarcinoma along with 1/15 lymph nodes with metastatic  carcinoma. Final stage ypT2 N1.  - Discussed the role of adjuvant nivolumab per Checkmate 577 which showed an increase in disease-free survival when nivolumab was given every 2 weeks for 8 treatments followed by every 4 weeks to complete the year.   - Reviewed nivolumab and side effects. Patient was agreeable to treatment and consent signed.   - 8/2/23 completed one year of adjuvant nivolumab     8/2/23:  - No contraindications to proceed with last treatment of nivolumab today. She has completed 1 year of treatment.   - We discussed that she already has her next scans scheduled through Surgery.  - We discussed that she would like to keep her port in due to having poor IV access.  - We will plan for 6-week port flush and follow-up with me in 12 weeks.   - She will call us with any worsening symptoms.   - Her major compliant continues to be poor appetite and trouble eating. We will have repeat labs with immunotherapy in 6 weeks and I will see her again in 12 weeks.   - RTC with me in 12 weeks.      11/8/23:  - We will plan for a port flush today and blood work.   - She is planned for  an ultrasound and follow-up with Dr. Poon tomorrow.  - She did have a new rash on her toes concerning for bullous pemphigoid versus a hypersensitivity that resolved with steroids. We discussed if those symptoms come back to call us and we can get her in with Dermatology   as she may need steroids for a longer period of time.  - We will plan to check an ACTH and TSH with labs today. If unremarkable, we will plan for follow-up in 3 months with a CT chest prior along with CBC, CMP, ACTH and TSH.   - We will plan for another Signatera in 3 months.   - We will plan for a port flush in 8 weeks.  - Due to traveling her follow up and port flushes will be stretched out to allow for travel to [place] in February 2024.   - RTC with me in March 2024.       3/21/24:  - Reviewed most recent blood work and CT scans. No evidence of recurrence.   - At this time, she would like to have her port removed which we will help arrange.   - Otherwise, we will plan for follow-up in 3 months with labs and likely plan for imaging in 6 months with CT chest.   - RTC with Hector in 3 months and with me in 6 months.      6/21/24:  - Patient is describing some satiety and trouble with eating.   - We discussed he plan for a CT of the chest to rule out progression or recurrence of disease.  - Given she is having trouble swallowing and some neuropathy and nerve changes in her right shoulder and neck we will plan for a CT of the neck as well  - will reach out to Dr Lora regarding repeat EGD with her new dysphagia  - RTC in 2 weeks with phone visit.     7/11/24: Telephone visit  - CT chest without new findings to suggest recurrence  - dicussed with Dr Lora who will help expedite EGD for dysphagia  - she will call with worsening symptoms  - RTC In 3 mo or sooner if new findings on EGD    11/14/24:  - Labs unremarkable.  - She continues to feel well.  - Recent EGD 7/23/24 with no evidence of recurrence.  - We will plan for repeat scans at the end of  January before she leaves.   - She is having some worsening Right shoulder pain that I suspect is neuropathy.  - We will plan to start Gabapentin.  - Follow-up in January after scans.     Reviewed ongoing medical problems and how they relate to her GE junction adenocarcinoma, will continue long term monitoring.     RTC in January after scans. This note has been transcribed using a medical scribe and there is a possibility of unintentional typing misprints.     Diagnoses and all orders for this visit:  Adenocarcinoma of gastroesophageal junction (Multi)  -     Clinic Appointment Request  -     CT chest abdomen pelvis wo IV contrast; Future  -     Clinic Appointment Request; Future  -     CBC and Auto Differential; Future  -     Comprehensive metabolic panel; Future  -     gabapentin (Neurontin) 100 mg capsule; Take 1 capsule (100 mg) by mouth once daily at bedtime. Can double dose after 3-4 day if no side effects    Renay Lau MD  Hematology/Oncology  [facility]    Scribe Attestation  By signing my name below, IMolly Scribe   attest that this documentation has been prepared under the direction and in the presence of Renay Lau MD.     Time Spent  Prep time on day of patient encounter: 5 minutes  Time spent directly with patient, family or caregiver: 16 minutes  Additional Time Spent on Patient Care Activities: 5 minutes  Documentation Time: 5 minutes  Other Time Spent: 0 minutes  Total: 31 minutes

## 2024-11-15 RX ORDER — OMEPRAZOLE 40 MG/1
40 CAPSULE, DELAYED RELEASE ORAL DAILY
Qty: 90 CAPSULE | Refills: 3 | Status: SHIPPED | OUTPATIENT
Start: 2024-11-15

## 2024-12-04 ENCOUNTER — APPOINTMENT (OUTPATIENT)
Dept: NEUROLOGY | Facility: CLINIC | Age: 74
End: 2024-12-04
Payer: MEDICARE

## 2024-12-06 DIAGNOSIS — E04.1 THYROID NODULE: Primary | ICD-10-CM

## 2024-12-20 ENCOUNTER — APPOINTMENT (OUTPATIENT)
Dept: PRIMARY CARE | Facility: CLINIC | Age: 74
End: 2024-12-20
Payer: MEDICARE

## 2024-12-20 VITALS
WEIGHT: 121 LBS | DIASTOLIC BLOOD PRESSURE: 62 MMHG | HEIGHT: 61 IN | HEART RATE: 64 BPM | TEMPERATURE: 97.3 F | SYSTOLIC BLOOD PRESSURE: 116 MMHG | RESPIRATION RATE: 16 BRPM | OXYGEN SATURATION: 96 % | BODY MASS INDEX: 22.84 KG/M2

## 2024-12-20 DIAGNOSIS — K50.919 CROHN'S DISEASE WITH COMPLICATION, UNSPECIFIED GASTROINTESTINAL TRACT LOCATION: ICD-10-CM

## 2024-12-20 DIAGNOSIS — M54.12 C6 RADICULOPATHY: ICD-10-CM

## 2024-12-20 DIAGNOSIS — I25.10 ATHSCL HEART DISEASE OF NATIVE CORONARY ARTERY W/O ANG PCTRS: ICD-10-CM

## 2024-12-20 DIAGNOSIS — R73.9 HYPERGLYCEMIA: ICD-10-CM

## 2024-12-20 DIAGNOSIS — Z12.11 COLON CANCER SCREENING: ICD-10-CM

## 2024-12-20 DIAGNOSIS — K86.1 CHRONIC PANCREATITIS, UNSPECIFIED PANCREATITIS TYPE (MULTI): ICD-10-CM

## 2024-12-20 DIAGNOSIS — F41.9 ANXIETY: ICD-10-CM

## 2024-12-20 DIAGNOSIS — M79.605 PAIN OF LEFT LOWER EXTREMITY: Primary | ICD-10-CM

## 2024-12-20 DIAGNOSIS — C77.2 SECONDARY AND UNSPECIFIED MALIGNANT NEOPLASM OF INTRA-ABDOMINAL LYMPH NODES (MULTI): ICD-10-CM

## 2024-12-20 DIAGNOSIS — C15.9 ESOPHAGEAL ADENOCARCINOMA (MULTI): ICD-10-CM

## 2024-12-20 DIAGNOSIS — F32.0 DEPRESSION, MAJOR, SINGLE EPISODE, MILD (CMS-HCC): ICD-10-CM

## 2024-12-20 PROCEDURE — 1036F TOBACCO NON-USER: CPT | Performed by: FAMILY MEDICINE

## 2024-12-20 PROCEDURE — 1160F RVW MEDS BY RX/DR IN RCRD: CPT | Performed by: FAMILY MEDICINE

## 2024-12-20 PROCEDURE — 1159F MED LIST DOCD IN RCRD: CPT | Performed by: FAMILY MEDICINE

## 2024-12-20 PROCEDURE — 1123F ACP DISCUSS/DSCN MKR DOCD: CPT | Performed by: FAMILY MEDICINE

## 2024-12-20 PROCEDURE — 3008F BODY MASS INDEX DOCD: CPT | Performed by: FAMILY MEDICINE

## 2024-12-20 PROCEDURE — 1157F ADVNC CARE PLAN IN RCRD: CPT | Performed by: FAMILY MEDICINE

## 2024-12-20 PROCEDURE — 99214 OFFICE O/P EST MOD 30 MIN: CPT | Performed by: FAMILY MEDICINE

## 2024-12-20 RX ORDER — ALPRAZOLAM 0.25 MG/1
0.25 TABLET ORAL 3 TIMES DAILY PRN
Qty: 40 TABLET | Refills: 2 | Status: SHIPPED | OUTPATIENT
Start: 2024-12-20

## 2024-12-20 NOTE — PROGRESS NOTES
Subjective   Patient ID: Sarah Chavez is a 74 y.o. female who presents for Depression, Anxiety, Pancreatitis, and Crohn's Disease.  Covid vax: UTD  Flu: UTD  Pneumo: UTD  RSV: UTD  Shingles: UTD     CRC: due 2024-unable to tolerate prep  Mammogram: 7/2024  Pap: hysterectomy  Lmp: hysterectomy  Gained 6#!  Doing well  Seeing heme onc  Sees q 6mo    HPI  Patient Active Problem List   Diagnosis    Actinic keratosis    Anxiety    Athscl heart disease of native coronary artery w/o ang pctrs    Cancer of abdominal esophagus (Multi)    Candidal intertrigo    Cardiomegaly    Cervical paraspinal muscle spasm    Chronic pancreatitis (Multi)    Depression, major, single episode, mild (CMS-HCC)    Dysuria    Elevated platelet count    Esophageal adenocarcinoma (Multi)    GERD (gastroesophageal reflux disease)    Globus sensation    Hematuria    Hyperglycemia    Intraepithelial neoplasm of pancreas    Mild aortic insufficiency    Mild diastolic dysfunction    Nausea and/or vomiting    Odynophagia    Papillary thyroid carcinoma (Multi)    Pleural effusion    Post-splenectomy    Pulmonary embolism    Rheumatoid arthritis    Seasonal allergies    Sleep difficulties    Goiter    Thyroid nodule    Trigger point of neck    Urinary tract infection    Uses feeding tube    Overweight with body mass index (BMI) of 25 to 25.9 in adult    Crohn's disease (Multi)    Encounter for Medicare annual wellness exam    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)    Gastrostomy status (Multi)    Protein-calorie malnutrition, unspecified severity (Multi)    Esophageal dysphagia       Past Surgical History:   Procedure Laterality Date    APPENDECTOMY  1981    CHOLECYSTECTOMY  1994    COLONOSCOPY  09/2019    early adenomatous glandular changes-due 2024--declines-unable to tolerate prep    CYSTOSCOPY      ESOPHAGUS SURGERY      HYSTERECTOMY  1987    STEVO USO    LITHOTRIPSY      PANCREAS SURGERY  2019    30%  "of pancreas removed    SHOULDER SURGERY Right 1977    1 pin    SKIN GRAFT Left     Left Foot    SPLENECTOMY, TOTAL      THYROIDECTOMY, PARTIAL         Review of Systems  This patient has  NO history of seizures/ CAD or CVA    NO history of recent Covid nor flu symptoms,  NO Fever nor chills,  NO Chest pain, shortness of breath nor paroxysmal nocturnal dyspnea,  NO Nausea, vomiting, nor diarrhea,  NO Hematochezia nor melena,  NO Dysuria, hematuria, nor new incontinence issues  NO new severe headaches nor neurological complaints,  NO new issues with anxiety nor depression nor new psychiatric complaints,  NO suicidal nor homicidal ideations.     OBJECTIVE:  /62   Pulse 64   Temp 36.3 °C (97.3 °F) (Temporal)   Resp 16   Ht 1.561 m (5' 1.46\")   Wt 54.9 kg (121 lb)   LMP 01/01/1987 (Approximate)   SpO2 96%   BMI 22.52 kg/m²      General:  alert, oriented, no acute distress.  No obvious skin rashes noted.   No gait disturbance noted.    Mood is pleasant,  no signs of emotional distress.   Not appearing intoxicated or altered.   No voiced delusions,   Normal, appropriate behavior.    HEENT: Normocephalic, atraumatic,   Pupils round, reactive to light  Extraocular motions intact and wnl  Tympanic membranes normal    Neck: no nuchal rigidity  No masses palpable.  No carotid bruits.  No thyromegaly.    Respiratory: Equal breath sounds  No wheezes,    rales,    nor rhonchi  No respiratory distress.    Heart: Regular rate and rhythm, no    murmurs  no rubs/gallops    Abdomen: no masses palpable, nontender, no rebound nor guarding.    Extremities: NO cyanosis noted, no clubbing.   No edema noted.  2+dorsalis pedis pulses.    Normal-not antalgic, steady gait.    Lab on 11/11/2024   Component Date Value Ref Range Status    WBC 11/11/2024 7.1  4.4 - 11.3 x10*3/uL Final    nRBC 11/11/2024 0.0  0.0 - 0.0 /100 WBCs Final    RBC 11/11/2024 4.21  4.00 - 5.20 x10*6/uL Final    Hemoglobin 11/11/2024 14.2  12.0 - 16.0 g/dL " Final    Hematocrit 11/11/2024 42.7  36.0 - 46.0 % Final    MCV 11/11/2024 101 (H)  80 - 100 fL Final    MCH 11/11/2024 33.7  26.0 - 34.0 pg Final    MCHC 11/11/2024 33.3  32.0 - 36.0 g/dL Final    RDW 11/11/2024 14.1  11.5 - 14.5 % Final    Platelets 11/11/2024 255  150 - 450 x10*3/uL Final    Neutrophils % 11/11/2024 37.3  40.0 - 80.0 % Final    Immature Granulocytes %, Automated 11/11/2024 0.1  0.0 - 0.9 % Final    Immature Granulocyte Count (IG) includes promyelocytes, myelocytes and metamyelocytes but does not include bands. Percent differential counts (%) should be interpreted in the context of the absolute cell counts (cells/UL).    Lymphocytes % 11/11/2024 42.2  13.0 - 44.0 % Final    Monocytes % 11/11/2024 12.5  2.0 - 10.0 % Final    Eosinophils % 11/11/2024 6.2  0.0 - 6.0 % Final    Basophils % 11/11/2024 1.7  0.0 - 2.0 % Final    Neutrophils Absolute 11/11/2024 2.66  1.60 - 5.50 x10*3/uL Final    Percent differential counts (%) should be interpreted in the context of the absolute cell counts (cells/uL).    Immature Granulocytes Absolute, Au* 11/11/2024 0.01  0.00 - 0.50 x10*3/uL Final    Lymphocytes Absolute 11/11/2024 3.01 (H)  0.80 - 3.00 x10*3/uL Final    Monocytes Absolute 11/11/2024 0.89 (H)  0.05 - 0.80 x10*3/uL Final    Eosinophils Absolute 11/11/2024 0.44 (H)  0.00 - 0.40 x10*3/uL Final    Basophils Absolute 11/11/2024 0.12 (H)  0.00 - 0.10 x10*3/uL Final    Glucose 11/11/2024 114 (H)  74 - 99 mg/dL Final    Sodium 11/11/2024 142  136 - 145 mmol/L Final    Potassium 11/11/2024 4.5  3.5 - 5.3 mmol/L Final    Chloride 11/11/2024 106  98 - 107 mmol/L Final    Bicarbonate 11/11/2024 34 (H)  21 - 32 mmol/L Final    Anion Gap 11/11/2024 7 (L)  10 - 20 mmol/L Final    Urea Nitrogen 11/11/2024 9  6 - 23 mg/dL Final    Creatinine 11/11/2024 0.74  0.50 - 1.05 mg/dL Final    eGFR 11/11/2024 86  >60 mL/min/1.73m*2 Final    Calculations of estimated GFR are performed using the 2021 CKD-EPI Study Refit equation  without the race variable for the IDMS-Traceable creatinine methods.  https://jasn.asnjournals.org/content/early/2021/09/22/ASN.2678749707    Calcium 11/11/2024 8.9  8.6 - 10.3 mg/dL Final    Albumin 11/11/2024 3.7  3.4 - 5.0 g/dL Final    Alkaline Phosphatase 11/11/2024 88  33 - 136 U/L Final    Total Protein 11/11/2024 6.0 (L)  6.4 - 8.2 g/dL Final    AST 11/11/2024 53 (H)  9 - 39 U/L Final    Bilirubin, Total 11/11/2024 0.6  0.0 - 1.2 mg/dL Final    ALT 11/11/2024 43  7 - 45 U/L Final    Patients treated with Sulfasalazine may generate falsely decreased results for ALT.        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  Reviewed controlled substance agreement - including but not limited to the risks-benefits-alternatives to treatment with a controlled substance medication(s).  It is recommended that [place] reports be checked and patient have appointment at least every 3months(6 for certain medicines only)  If the agreement signed (controlled substance  agreement) is violated prescriptions may be stopped abruptly and patient /family understands and agrees to this.  Another reason for termination of agreement is if we have concern for abuse or overuse and it is also recommended that patient take responsibility to try to taper and minimize use of these medicines frequently trying to limit or gradually taper to discontinuation.    Patient is aware that side effects such as insomnia, unexpected weight changes are unexpected and should result in discontinuation.  Always use caution and AVOID operating machinery(driving etc) on pain medicines or CNS depressants and avoid combining together OR with alcohol. If opioids are prescribed patient understands the benefits of narcan and was offered prescription.  Follow up sooner if condition deteriorates or problems arise.  Reviewed controlled substances agreement including but not limited to the benefits-risks and alternatives to treatment with a controlled substance medication.    Agrees to regular follow and counseling for maximum benefits.  Going to [place] for winter 1mo  Lle pain episodic  Neg homans  Will check duplex  Emg reviewed  Didn't want to see neuro  Declines NS  The patient is aware that results will be forthcoming of ALL planned labs and or tests. If no results are received on my chart or by letter within 1 - 3 weeks, the patient is aware they need to call to obtain results, as this is not usual. Also, if any new conditions arise, or current condition worsens, it is understood that sooner appointment should be made or urgent care/convenient care or emergency room treatment should be sought depending on severity. Otherwise follow up for recheck at regular intervals as we have discussed, at least yearly.

## 2024-12-20 NOTE — PROGRESS NOTES
Covid vax: UTD  Flu: UTD  Pneumo: UTD  RSV: UTD  Shingles: UTD    CRC: due 2024-unable to tolerate prep  Mammogram: 7/2024  Pap: hysterectomy  Lmp: hysterectomy

## 2025-01-15 ENCOUNTER — HOSPITAL ENCOUNTER (OUTPATIENT)
Dept: RADIOLOGY | Facility: HOSPITAL | Age: 75
Discharge: HOME | End: 2025-01-15
Payer: MEDICARE

## 2025-01-15 DIAGNOSIS — M79.605 PAIN OF LEFT LOWER EXTREMITY: ICD-10-CM

## 2025-01-15 DIAGNOSIS — E04.1 THYROID NODULE: ICD-10-CM

## 2025-01-15 PROCEDURE — 76536 US EXAM OF HEAD AND NECK: CPT | Performed by: RADIOLOGY

## 2025-01-15 PROCEDURE — 93971 EXTREMITY STUDY: CPT

## 2025-01-15 PROCEDURE — 76536 US EXAM OF HEAD AND NECK: CPT

## 2025-01-20 ENCOUNTER — APPOINTMENT (OUTPATIENT)
Dept: OTOLARYNGOLOGY | Facility: CLINIC | Age: 75
End: 2025-01-20
Payer: MEDICARE

## 2025-01-20 DIAGNOSIS — C73 PAPILLARY THYROID CARCINOMA (MULTI): Primary | ICD-10-CM

## 2025-01-20 PROCEDURE — 1160F RVW MEDS BY RX/DR IN RCRD: CPT | Performed by: OTOLARYNGOLOGY

## 2025-01-20 PROCEDURE — 1157F ADVNC CARE PLAN IN RCRD: CPT | Performed by: OTOLARYNGOLOGY

## 2025-01-20 PROCEDURE — 99213 OFFICE O/P EST LOW 20 MIN: CPT | Performed by: OTOLARYNGOLOGY

## 2025-01-20 PROCEDURE — 1159F MED LIST DOCD IN RCRD: CPT | Performed by: OTOLARYNGOLOGY

## 2025-01-20 PROCEDURE — 1123F ACP DISCUSS/DSCN MKR DOCD: CPT | Performed by: OTOLARYNGOLOGY

## 2025-01-20 NOTE — PROGRESS NOTES
The patient returns.  We are seeing her back today with completed thyroid ultrasound done for comparative reasons based on prior history of thyroid malignancy.  She is doing very well.  Repeat thyroid ultrasound thankfully shows nothing worrisome.  There have been no significant changes in past medical or past surgical histories except as mentioned.    Exam:  No acute distress.  The external ear structures appear normal. The ear canals patent and the tympanic membranes are intact without evidence of air-fluid levels, retraction, or congenital defects.  Anterior rhinoscopy notes essentially a midline nasal septum. Examination is noted for normal healthy mucosal membranes without any evidence of lesions, polyps, or exudate. The tongue is normally mobile. There are no lesions on the gingiva, buccal, or oral mucosa. There are no oral cavity masses.  The neck is negative for mass lymphadenopathy. The trachea and parotid are clear. The thyroid bed is grossly unremarkable. The salivary gland structures are grossly unremarkable.    Assessment and plan:  Doing very well following previous thyroidectomy for malignancy with partial thyroidectomy performed and currently residual lobe showing nothing worrisome.  Will repeat thyroid ultrasound in 1 year.  Patient to contact me directly sooner with any major issues or concerns.  All questions were answered in this regard accordingly.

## 2025-01-27 ENCOUNTER — ANCILLARY PROCEDURE (OUTPATIENT)
Facility: HOSPITAL | Age: 75
End: 2025-01-27
Payer: MEDICARE

## 2025-01-27 DIAGNOSIS — C16.0 ADENOCARCINOMA OF GASTROESOPHAGEAL JUNCTION (MULTI): ICD-10-CM

## 2025-01-27 PROCEDURE — 74176 CT ABD & PELVIS W/O CONTRAST: CPT

## 2025-01-27 PROCEDURE — 71250 CT THORAX DX C-: CPT | Performed by: RADIOLOGY

## 2025-01-27 PROCEDURE — 74176 CT ABD & PELVIS W/O CONTRAST: CPT | Performed by: RADIOLOGY

## 2025-01-30 ENCOUNTER — LAB (OUTPATIENT)
Dept: LAB | Facility: CLINIC | Age: 75
End: 2025-01-30
Payer: MEDICARE

## 2025-01-30 ENCOUNTER — OFFICE VISIT (OUTPATIENT)
Dept: HEMATOLOGY/ONCOLOGY | Facility: CLINIC | Age: 75
End: 2025-01-30
Payer: MEDICARE

## 2025-01-30 VITALS
WEIGHT: 122.58 LBS | HEART RATE: 64 BPM | BODY MASS INDEX: 22.82 KG/M2 | SYSTOLIC BLOOD PRESSURE: 150 MMHG | DIASTOLIC BLOOD PRESSURE: 87 MMHG | OXYGEN SATURATION: 93 % | TEMPERATURE: 97 F | RESPIRATION RATE: 18 BRPM

## 2025-01-30 DIAGNOSIS — C16.0 ADENOCARCINOMA OF GASTROESOPHAGEAL JUNCTION (MULTI): ICD-10-CM

## 2025-01-30 DIAGNOSIS — C15.9 ADENOCARCINOMA OF ESOPHAGUS (MULTI): ICD-10-CM

## 2025-01-30 LAB
ALBUMIN SERPL BCP-MCNC: 4 G/DL (ref 3.4–5)
ALP SERPL-CCNC: 72 U/L (ref 33–136)
ALT SERPL W P-5'-P-CCNC: 16 U/L (ref 7–45)
ANION GAP SERPL CALC-SCNC: 11 MMOL/L (ref 10–20)
AST SERPL W P-5'-P-CCNC: 19 U/L (ref 9–39)
BASOPHILS # BLD AUTO: 0.07 X10*3/UL (ref 0–0.1)
BASOPHILS NFR BLD AUTO: 1.1 %
BILIRUB SERPL-MCNC: 0.6 MG/DL (ref 0–1.2)
BUN SERPL-MCNC: 12 MG/DL (ref 6–23)
CALCIUM SERPL-MCNC: 9.3 MG/DL (ref 8.6–10.3)
CHLORIDE SERPL-SCNC: 101 MMOL/L (ref 98–107)
CO2 SERPL-SCNC: 31 MMOL/L (ref 21–32)
CREAT SERPL-MCNC: 0.65 MG/DL (ref 0.5–1.05)
EGFRCR SERPLBLD CKD-EPI 2021: >90 ML/MIN/1.73M*2
EOSINOPHIL # BLD AUTO: 0.22 X10*3/UL (ref 0–0.4)
EOSINOPHIL NFR BLD AUTO: 3.5 %
ERYTHROCYTE [DISTWIDTH] IN BLOOD BY AUTOMATED COUNT: 13.5 % (ref 11.5–14.5)
GLUCOSE SERPL-MCNC: 99 MG/DL (ref 74–99)
HCT VFR BLD AUTO: 41.9 % (ref 36–46)
HGB BLD-MCNC: 14 G/DL (ref 12–16)
IMM GRANULOCYTES # BLD AUTO: 0 X10*3/UL (ref 0–0.5)
IMM GRANULOCYTES NFR BLD AUTO: 0 % (ref 0–0.9)
LYMPHOCYTES # BLD AUTO: 2.5 X10*3/UL (ref 0.8–3)
LYMPHOCYTES NFR BLD AUTO: 40.1 %
MCH RBC QN AUTO: 34.6 PG (ref 26–34)
MCHC RBC AUTO-ENTMCNC: 33.4 G/DL (ref 32–36)
MCV RBC AUTO: 104 FL (ref 80–100)
MONOCYTES # BLD AUTO: 0.76 X10*3/UL (ref 0.05–0.8)
MONOCYTES NFR BLD AUTO: 12.2 %
NEUTROPHILS # BLD AUTO: 2.68 X10*3/UL (ref 1.6–5.5)
NEUTROPHILS NFR BLD AUTO: 43.1 %
PLATELET # BLD AUTO: 237 X10*3/UL (ref 150–450)
POTASSIUM SERPL-SCNC: 3.7 MMOL/L (ref 3.5–5.3)
PROT SERPL-MCNC: 6.3 G/DL (ref 6.4–8.2)
RBC # BLD AUTO: 4.05 X10*6/UL (ref 4–5.2)
SODIUM SERPL-SCNC: 139 MMOL/L (ref 136–145)
WBC # BLD AUTO: 6.2 X10*3/UL (ref 4.4–11.3)

## 2025-01-30 PROCEDURE — 1123F ACP DISCUSS/DSCN MKR DOCD: CPT | Performed by: INTERNAL MEDICINE

## 2025-01-30 PROCEDURE — 99214 OFFICE O/P EST MOD 30 MIN: CPT | Performed by: INTERNAL MEDICINE

## 2025-01-30 PROCEDURE — 1157F ADVNC CARE PLAN IN RCRD: CPT | Performed by: INTERNAL MEDICINE

## 2025-01-30 PROCEDURE — 36415 COLL VENOUS BLD VENIPUNCTURE: CPT

## 2025-01-30 PROCEDURE — 84075 ASSAY ALKALINE PHOSPHATASE: CPT

## 2025-01-30 PROCEDURE — 1126F AMNT PAIN NOTED NONE PRSNT: CPT | Performed by: INTERNAL MEDICINE

## 2025-01-30 PROCEDURE — G2211 COMPLEX E/M VISIT ADD ON: HCPCS | Performed by: INTERNAL MEDICINE

## 2025-01-30 PROCEDURE — 85025 COMPLETE CBC W/AUTO DIFF WBC: CPT

## 2025-01-30 PROCEDURE — 1159F MED LIST DOCD IN RCRD: CPT | Performed by: INTERNAL MEDICINE

## 2025-01-30 ASSESSMENT — PAIN SCALES - GENERAL: PAINLEVEL_OUTOF10: 0-NO PAIN

## 2025-01-30 NOTE — PROGRESS NOTES
Patient ID: Sarah Chavez is a 74 y.o. female.    Subjective    HPI  Ms. Sarah Chavez is a 75 y/o F who presents for follow-up for esophageal cancer. She reports being stable. She states she has put on some weight. No new pain. No GI issues. Denies difficulty eating. No rash or other skin issues. No new medical issues or concerns. Denies any swelling.      Patient's past medical history, surgical history, family history and social history reviewed.    Objective      LMP 01/01/1987 (Approximate)   Review of Systems:   Review of Systems:    Positive per HPI, otherwise negative.    Physical Exam  Gen: appears well in clinic, NAD  HEENT: atraumatic head, normocephalic, EOMI, conjunctiva normal  LUNG: no increased WOB, CTAB  CV: No JVD. RRR  GI: soft, NT, ND  LE: no LE edema  Skin: no obvious rashes or lesions on visible skin  Neuro: interactive, no focal deficits noted  Psych: normal mood and affect  Performance Status:  Symptomatic; fully ambulatory    Labs/Imaging/Pathology: personally reviewed reports and images in Epic electronic medical record system. Pertinent results as it related to the plan represented in below in assessment and plan.   Assessment/Plan   1. GE junction adenocarcinoma, ypT2N1  - Initially presented to GI  1/10/2022 with epigastric pain and dysphasia, weight loss. Patient  underwent an EGD on 1/21/2022 with Dr. Lora and was found to have a malignant esophageal tumor at the GE junction . An upper EUS with Dr. Delgadillo  on 2/3/2022 found to have a malignant tumor in the lower esophagus, at the GE  junction and extending into the cardia. Pathology returned as adenocarcinoma stage T3N0 by endosonographic criteria.   - 3/2/2022 - Started FOLFOX, completed 3 cycles, C2 onward 80% paclitaxel due to neuropathy.  - 4/8/2022 - PET/CT per [facility] 47410 with great response.   - 4/18/2022 - Started on RT with FOLFOX on 4/19/2022.  - C5 skipped due to thrombocytopenia.  - 5/11/2022 - Last cycle of  FOLFOX.  - 6/27/2022 - Post CRT PET with good response.  - 7/20/2022 Status-post esophagectomy, final pathology revealed an 0.5 cm residual foci of poorly differentiated adenocarcinoma along with 1/15 lymph nodes with metastatic  carcinoma. Final stage ypT2 N1.  - Discussed the role of adjuvant nivolumab per Checkmate 577 which showed an increase in disease-free survival when nivolumab was given every 2 weeks for 8 treatments followed by every 4 weeks to complete the year.   - Reviewed nivolumab and side effects. Patient was agreeable to treatment and consent signed.   - 8/2/23 completed one year of adjuvant nivolumab     8/2/23:  - No contraindications to proceed with last treatment of nivolumab today. She has completed 1 year of treatment.   - We discussed that she already has her next scans scheduled through Surgery.  - We discussed that she would like to keep her port in due to having poor IV access.  - We will plan for 6-week port flush and follow-up with me in 12 weeks.   - She will call us with any worsening symptoms.   - Her major compliant continues to be poor appetite and trouble eating. We will have repeat labs with immunotherapy in 6 weeks and I will see her again in 12 weeks.   - RTC with me in 12 weeks.      11/8/23:  - We will plan for a port flush today and blood work.   - She is planned for an ultrasound and follow-up with Dr. Poon tomorrow.  - She did have a new rash on her toes concerning for bullous pemphigoid versus a hypersensitivity that resolved with steroids. We discussed if those symptoms come back to call us and we can get her in with Dermatology   as she may need steroids for a longer period of time.  - We will plan to check an ACTH and TSH with labs today. If unremarkable, we will plan for follow-up in 3 months with a CT chest prior along with CBC, CMP, ACTH and TSH.   - We will plan for another Signatera in 3 months.   - We will plan for a port flush in 8 weeks.  - Due to traveling  her follow up and port flushes will be stretched out to allow for travel to [place] in February 2024.   - RTC with me in March 2024.       3/21/24:  - Reviewed most recent blood work and CT scans. No evidence of recurrence.   - At this time, she would like to have her port removed which we will help arrange.   - Otherwise, we will plan for follow-up in 3 months with labs and likely plan for imaging in 6 months with CT chest.   - RTC with Hector in 3 months and with me in 6 months.      6/21/24:  - Patient is describing some satiety and trouble with eating.   - We discussed he plan for a CT of the chest to rule out progression or recurrence of disease.  - Given she is having trouble swallowing and some neuropathy and nerve changes in her right shoulder and neck we will plan for a CT of the neck as well  - will reach out to Dr Lora regarding repeat EGD with her new dysphagia  - RTC in 2 weeks with phone visit.     7/11/24: Telephone visit  - CT chest without new findings to suggest recurrence  - dicussed with Dr Lora who will help expedite EGD for dysphagia  - she will call with worsening symptoms  - RTC In 3 mo or sooner if new findings on EGD     11/14/24:  - Labs unremarkable.  - She continues to feel well.  - Recent EGD 7/23/24 with no evidence of recurrence.  - We will plan for repeat scans at the end of January before she leaves.   - She is having some worsening Right shoulder pain that I suspect is neuropathy.  - We will plan to start Gabapentin.  - Follow-up in January after scans.    1/30/25:  - No evidence of recurrence on most recent scans.   - She otherwise feels well with good energy and appetite. Her appetite has increased.   - we will continue surveillance.  - Cmp and signatera from today pending  - Repeat labs and CT scans in 6 months  - She will be over 3 years out, and we will likely consider once a year moving forward.   - RTC in 6 months       Reviewed ongoing medical problems and how they relate  to her GE junction adenocarcinoma, will continue long term monitoring.    RTC in 6 mo. This note has been transcribed using a medical scribe and there is a possibility of unintentional typing misprints.     Diagnoses and all orders for this visit:  Adenocarcinoma of gastroesophageal junction (Multi)  -     Clinic Appointment Request  -     Clinic Appointment Request; Future  -     CBC and Auto Differential; Future  -     Comprehensive metabolic panel; Future  -     CT chest abdomen pelvis w IV contrast; Future  -     signatera; Other-indicate in comment; unknown - Miscellaneous Test; Future    Renay Lau MD  Hematology/Oncology  [facility]    Scribe Attestation  By signing my name below, Molly ROMERO Scribe   attest that this documentation has been prepared under the direction and in the presence of Renay Lau MD.     Time Spent  Prep time on day of patient encounter: 5 minutes  Time spent directly with patient, family or caregiver: 20 minutes  Additional Time Spent on Patient Care Activities: 5 minutes  Documentation Time: 5 minutes  Other Time Spent: 0 minutes  Total: 35 minutes          Scribe Attestation  By signing my name below, Agata ROMERO Scribe   attest that this documentation has been prepared under the direction and in the presence of Renay Lau MD.

## 2025-02-04 ENCOUNTER — APPOINTMENT (OUTPATIENT)
Dept: SURGERY | Facility: CLINIC | Age: 75
End: 2025-02-04
Payer: MEDICARE

## 2025-02-14 ENCOUNTER — PATIENT MESSAGE (OUTPATIENT)
Dept: HEMATOLOGY/ONCOLOGY | Facility: CLINIC | Age: 75
End: 2025-02-14
Payer: MEDICARE

## 2025-02-17 NOTE — PROGRESS NOTES
I left a message for Sarah notifying her that her signatera was negative. Call back information was left and it was encouraged that she call the clinic with any questions.

## 2025-03-06 ENCOUNTER — TELEMEDICINE (OUTPATIENT)
Dept: SURGERY | Facility: HOSPITAL | Age: 75
End: 2025-03-06
Payer: MEDICARE

## 2025-03-06 DIAGNOSIS — C15.9 ESOPHAGEAL ADENOCARCINOMA (MULTI): Primary | ICD-10-CM

## 2025-03-06 NOTE — PATIENT INSTRUCTIONS
"Virtual Video Follow up in 3 months with CT chest prior on 7/23. See appointment below in \"What's Next\".  Call our office with any questions. [phone]    "

## 2025-03-10 NOTE — PROGRESS NOTES
C/C:  Follow up visit    Virtual or Telephone Consent    While technically available, the patient was unable or unwilling to consent to connect via audio/video telehealth technology; therefore, I performed this visit using a real-time audio only connection between Sarah Chavez & Sylvia Poon DO.  Verbal consent was requested and obtained from Sarah Chavez on this date, 03/6/25 for a telehealth visit and the patient's location was confirmed at the time of the visit.    History Of Present Illness  Sarah Chavez is a 74 y.o. female presenting for virtual follow up with surveillance imaging given her h/o esophageal cancer. She is now s/p minimally invasive [place] Navdeep esophagectomy and jejunostomy tube placement on 7/20/2022. She completed 1 year of adjuvant Nivolumab with Dr. Lau     Since our last visit she has been doing well.  She denies any issues with dysphagia, no notable reflux if she is on her medications, and denies food regurgitation.  Just returned from a nice trip to [place]. Weight is stable.      By way of review: patient was seen in January 2022 for odynophagia/dysphagia and GERD. She reports having intermittent heartburn, especially at night for the past 4-5 years but this became significantly worse at the end of last year around the time of the holidays. She had lost ~10 pounds since these symptoms started. She had minimal response to BID PPI therapy and she underwent an EGD in January with Dr. Lora which shoed a distal esophageal mass confirmed adenocarcinoma. She had a f/up EUS with Dr. Delgadillo showing at least a T3N0 disease. She is now s/p CROSS regiment neoadj chemoXRT which she finished 5/25. She had notable weight loss during her treatment but did not wish to pursue a feeding tube. Patient states she just started to feel better in terms of her appetite and energy level about 2 weeks ago. Weight has stabilized, no recent gain.      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 Of note she did develop a PE several months after this surgery        Past Medical History  She has a past medical history of Abdominal wall pain, Abnormal EKG, Anxiety, Bilateral kidney stones, Crohn's disease (Multi), Dysphagia, GERD (gastroesophageal reflux disease), History of blood transfusion, History of esophageal cancer, History of goiter, History of mammogram (07/05/2024), chronic pancreatitis, Pulmonary embolism, Seasonal allergies, Thyrotoxicosis, and Wears reading eyeglasses.    Social History  She reports that she quit smoking about 12 years ago. Her smoking use included cigarettes. She has never been exposed to tobacco smoke. She has never used smokeless tobacco. She reports that she does not currently use alcohol. She reports that she does not use drugs.      Medications    Current Outpatient Medications:     ALPRAZolam (Xanax) 0.25 mg tablet, Take 1 tablet (0.25 mg) by mouth 3 times a day as needed for anxiety or sleep., Disp: 40 tablet, Rfl: 2    apple cider vinegar 300 mg tablet, TAKE AS DIRECTED., Disp: , Rfl:     ascorbic acid (Vitamin C) 1,000 mg tablet, Take 1 tablet (1,000 mg) by mouth 2 times a day., Disp: , Rfl:     b complex vitamins capsule, Take 1 capsule by mouth once daily., Disp: , Rfl:     BACILLUS COAGULANS-INULIN ORAL, Take 1 tablet by mouth 1 (one) time each day., Disp: , Rfl:     bismuth subsalicylate (Pepto Bismol) 262 mg/15 mL suspension, Take 15 mL (262 mg) by mouth every 6 hours if needed for indigestion., Disp: , Rfl:     cholecalciferol (Vitamin D-3) 125 MCG (5000 UT) capsule, Take 1 capsule (125 mcg) by mouth once daily., Disp: , Rfl:     echinacea 400 mg capsule, Take 1 capsule by mouth 1 (one) time each day., Disp: , Rfl:     ferrous sulfate 325 (65 Fe) MG tablet, Take 1 tablet (325 mg) by mouth once daily with breakfast., Disp: , Rfl:     fish oil concentrate (Omega-3) 120-180 mg capsule, Take 1 capsule (1 g) by  mouth once daily., Disp: , Rfl:     gabapentin (Neurontin) 100 mg capsule, Take 1 capsule (100 mg) by mouth once daily at bedtime. Can double dose after 3-4 day if no side effects, Disp: 60 capsule, Rfl: 11    ketoconazole (NIZOral) 2 % cream, APPLY SPARINGLY TO AFFECTED AREA(S) ONCE DAILY, Disp: , Rfl:     magnesium 250 mg tablet, Take 1 tablet (250 mg) by mouth once daily., Disp: , Rfl:     melatonin 5 mg capsule, Take 1 capsule (5 mg) by mouth once daily at bedtime., Disp: , Rfl:     omeprazole (PriLOSEC) 40 mg DR capsule, TAKE 1 CAPSULE BY MOUTH DAILY, Disp: 90 capsule, Rfl: 3    potassium gluconate 595 mg (99 mg) tablet extended release, Take 1 tablet by mouth 1 (one) time each day., Disp: , Rfl:     predniSONE (Deltasone) 10 mg tablet, Take 1 tablet (10 mg) by mouth., Disp: , Rfl:     psyllium (Metamucil) 0.4 gram capsule, Take as directed, Disp: , Rfl:     rosuvastatin (Crestor) 5 mg tablet, Take 1 tablet (5 mg) by mouth once daily. (Patient taking differently: Take 0.5 tablets (2.5 mg) by mouth every other day.), Disp: 90 tablet, Rfl: 3    sertraline (Zoloft) 100 mg tablet, TAKE 1 TABLET BY MOUTH EVERY DAY, Disp: 30 tablet, Rfl: 5    sodium-potassium bicarbonate (Dahl[place] Gold) 344-1,050-1,000 mg dispersible tablet, Take 1 tablet by mouth once daily as needed., Disp: , Rfl:     traMADol (Ultram) 50 mg tablet, Take 1 tablet (50 mg) by mouth 4 times a day as needed., Disp: , Rfl:     turmeric root extract 500 mg tablet, Take 1 tablet by mouth 1 (one) time each day., Disp: , Rfl:     Allergies  Statins-hmg-coa reductase inhibitors    Review of Systems:  Review of Systems   Constitutional: No fevers, chills, unexpected weight change  HENT: No sore throat, congestion, or nasal drainage  Eyes: No visual changes or eye itching  Respiratory:  No cough, worsening dyspnea, wheezing  Cardiac: No chest pain, palpitations, or lower extremity edema  Gastrointestinal: No nausea, vomiting, diarrhea. No abdominal  pain  Genitourinary: No dysuria or hematuria  Musculoskeletal: No back pain. No significant myalgias or arthralgias  Neurologic: No headaches, dizziness, or seizures.  Hematologic: No east bleeding or bruising.  Psychiatric: No anxiety or depression.    Physical Exam:  Physical Exam  LMP 01/01/1987 (Approximate)   Vital signs stable  Constitutional:       General: Patient is not in acute distress.  Neurological:      General: No focal deficit present.      Mental Status: Patient is alert and oriented to person, place, and time.   Psychiatric:         Mood and Affect: Mood normal.       Relevant Results:     Pathology:  Accession #: [id number]            Pathologist:                   KRISHNA MEEK M.D.  Date of Procedure:    7/20/2022  Date Received:          7/20/2022  Date Reported           8/5/2022  Submitting Physician:   SAGAR DOWNS DO  Location:                    [facility]  Other External #                                                                   FINAL DIAGNOSIS  A. DISTAL ESOPHAGUS AND PROXIMAL STOMACH, ESOPHAGOGASTRECTOMY:  -- RESIDUAL MICROSCOPIC FOCI OF POORLY DIFFERENTIATED ADENOCARCINOMA (LARGEST  FOCUS: 0.5 CM) OF THE ESOPHAGOGASTRIC JUNCTION, INVADING INTO THE MUSCULARIS  [place].  -- STATUS POST NEOADJUVANT THERAPY WITH PARTIAL TREATMENT RESPONSE (CAP TUMOR  REGRESSION SCORE: 2).  -- FOCAL LYMPHOVASCULAR INVASION IS IDENTIFIED.  -- PERINEURAL INVASION IS NOT IDENTIFIED.  -- ALL SURGICAL RESECTION MARGINS ARE NEGATIVE.  -- METASTATIC CARCINOMA IN ONE OF FIFTEEN LYMPH NODES (1/15).  -- PATHOLOGIC STAGE (AJCC 8TH EDITION): ypT2 N1 (see synoptic report).  -- STOMACH WITH MILD CHRONIC INACTIVE GASTRITIS; NO HELICOBACTER IS SEEN.    B. STOMACH, ADDITIONAL MARGINS, EXCISION:  -- FULL THICKNESS SEGMENT OF STOMACH WITH NO SIGNIFICANT ABNORMALITY.  -- NEGATIVE FOR TUMOR.    C. ESOPHAGUS AND STOMACH, ANASTOMOTIC DONUTS, EXCISION:    -- FULL THICKNESS SEGMENTS OF ESOPHAGUS AND STOMACH WITH NO  SIGNIFICANT  ABNORMALITY.  -- NEGATIVE FOR TUMOR.    Comment: Immunohistochemical stains performed on block A17 demonstrate that the  tumor cells are focally positive for CDX2. Chromogranin is negative, while  INSM1 shows focal non-specific staining. RB1 shows preserved nuclear  expression. Mucicarmine shows no definite intracytoplasmic mucin in tumor  cells.                                                                                                                                             CASE SUMMARY REPORT       SPECIMEN  Procedure:      Esophagogastrectomy  TUMOR  Tumor Site:      Esophagogastric junction (EGJ)  Relationship of Tumor to Esophagogastric Junction:       Tumor midpoint is located at the esophagogastric junction  Histologic Type:      Adenocarcinoma  Histologic Grade:      G3, poorly differentiated, undifferentiated  Tumor Size:      Greatest Dimension (Centimeters): 0.5 cm  Tumor Extent:      Invades muscularis propria  Treatment Effect:      Present, with residual cancer showing evident tumor  regression, but more than single cells or rare small groups of cancer cells  (partial response, score 2)  Lymphovascular Invasion:      Present  Perineural Invasion:      Not identified  MARGINS  Margin Status for Invasive Carcinoma:      All margins negative for invasive  carcinoma   Closest Margin(s) to Invasive Carcinoma:        Proximal   Distance from Invasive Carcinoma to Closest Margin  :          4.9 cm  Margin Status for Dysplasia and Intestinal Metaplasia:       All margins negative for dysplasia  REGIONAL LYMPH NODES  Regional Lymph Node Status:        Tumor present in regional lymph node(s)     Number of Lymph Nodes with Tumor:           1   Number of Lymph Nodes Examined:         15  PATHOLOGIC STAGE CLASSIFICATION (pTNM, AJCC 8th Edition)  TNM Descriptors:      y (post-treatment)  pT Category:      pT2  pN Category:      pN1     Imaging:    === 01/27/25 ===    CT CHEST ABDOMEN PELVIS WO  CONTRAST    - Impression -  CHEST:    Status post esophagectomy with gastric pull-through. No evidence of  local disease recurrence or emerging thoracic metastatic disease  within limits of unenhanced exam.    ABDOMEN AND PELVIS:    No evidence of emerging metastatic disease or significant change from  11/09/2023 within limits of unenhanced exam.    MACRO:  None.    Signed by: Teetee Chiu 1/28/2025 3:19 PM  Dictation workstation:   [id number]      CT Chest was personally reviewed     Assessment/Plan   Diagnoses and all orders for this visit:  Esophageal adenocarcinoma (Multi)           Sarah Chavez is a 74 y.o. female with distal esophageal adenocarcinoma s/p CROSS regimen, completed 5/25 and s/p minimally invasive [place] Navdeep esophagectomy and jejunostomy tube placement on 7/20/2022. Her pathology did show ypT2N1 disease (negative margins, small residual tumor, 1/15 lymph nodes involved) and she completed 1 year adjuvant Nivolumab with Dr. Lau. Recent CT C/A/P shows ROJELIO. She has a new CT ordered for July per Dr. Barksdale, I will review her images and VV at that time.  Can likely go to annual surveillance after that      Sylvia Poon, DO  Thoracic & Esophageal Surgery

## 2025-03-20 LAB — SCAN RESULT: NORMAL

## 2025-03-24 ENCOUNTER — TELEPHONE (OUTPATIENT)
Dept: PRIMARY CARE | Facility: CLINIC | Age: 75
End: 2025-03-24
Payer: MEDICARE

## 2025-03-25 ENCOUNTER — APPOINTMENT (OUTPATIENT)
Dept: PRIMARY CARE | Facility: CLINIC | Age: 75
End: 2025-03-25
Payer: MEDICARE

## 2025-03-25 VITALS
TEMPERATURE: 96.6 F | OXYGEN SATURATION: 96 % | RESPIRATION RATE: 16 BRPM | SYSTOLIC BLOOD PRESSURE: 128 MMHG | HEART RATE: 60 BPM | DIASTOLIC BLOOD PRESSURE: 78 MMHG | WEIGHT: 126 LBS | BODY MASS INDEX: 23.79 KG/M2 | HEIGHT: 61 IN

## 2025-03-25 DIAGNOSIS — C15.5 CANCER OF ABDOMINAL ESOPHAGUS (MULTI): ICD-10-CM

## 2025-03-25 DIAGNOSIS — M06.9 RHEUMATOID ARTHRITIS, INVOLVING UNSPECIFIED SITE, UNSPECIFIED WHETHER RHEUMATOID FACTOR PRESENT (MULTI): ICD-10-CM

## 2025-03-25 DIAGNOSIS — Z00.00 ENCOUNTER FOR MEDICARE ANNUAL WELLNESS EXAM: ICD-10-CM

## 2025-03-25 DIAGNOSIS — I25.84 CORONARY ARTERY CALCIFICATION OF NATIVE ARTERY: Primary | ICD-10-CM

## 2025-03-25 DIAGNOSIS — M54.12 CERVICAL RADICULOPATHY: ICD-10-CM

## 2025-03-25 DIAGNOSIS — M50.122 CERVICAL DISC DISORDER AT C5-C6 LEVEL WITH RADICULOPATHY: ICD-10-CM

## 2025-03-25 DIAGNOSIS — R73.9 HYPERGLYCEMIA: ICD-10-CM

## 2025-03-25 DIAGNOSIS — E78.2 MIXED HYPERLIPIDEMIA: ICD-10-CM

## 2025-03-25 DIAGNOSIS — F41.9 ANXIETY: ICD-10-CM

## 2025-03-25 DIAGNOSIS — F32.0 DEPRESSION, MAJOR, SINGLE EPISODE, MILD (CMS-HCC): ICD-10-CM

## 2025-03-25 DIAGNOSIS — C73 PAPILLARY THYROID CARCINOMA: ICD-10-CM

## 2025-03-25 DIAGNOSIS — G47.9 SLEEP DIFFICULTIES: ICD-10-CM

## 2025-03-25 DIAGNOSIS — K50.919 CROHN'S DISEASE WITH COMPLICATION, UNSPECIFIED GASTROINTESTINAL TRACT LOCATION: ICD-10-CM

## 2025-03-25 DIAGNOSIS — I25.10 CORONARY ARTERY CALCIFICATION OF NATIVE ARTERY: Primary | ICD-10-CM

## 2025-03-25 DIAGNOSIS — C77.2 SECONDARY AND UNSPECIFIED MALIGNANT NEOPLASM OF INTRA-ABDOMINAL LYMPH NODES (MULTI): ICD-10-CM

## 2025-03-25 DIAGNOSIS — Z93.1 GASTROSTOMY STATUS (MULTI): ICD-10-CM

## 2025-03-25 PROBLEM — K86.1 CHRONIC PANCREATITIS (MULTI): Status: RESOLVED | Noted: 2023-02-04 | Resolved: 2025-03-25

## 2025-03-25 PROCEDURE — G0446 INTENS BEHAVE THER CARDIO DX: HCPCS | Performed by: FAMILY MEDICINE

## 2025-03-25 PROCEDURE — 1159F MED LIST DOCD IN RCRD: CPT | Performed by: FAMILY MEDICINE

## 2025-03-25 PROCEDURE — 99397 PER PM REEVAL EST PAT 65+ YR: CPT | Performed by: FAMILY MEDICINE

## 2025-03-25 PROCEDURE — 1170F FXNL STATUS ASSESSED: CPT | Performed by: FAMILY MEDICINE

## 2025-03-25 PROCEDURE — 1157F ADVNC CARE PLAN IN RCRD: CPT | Performed by: FAMILY MEDICINE

## 2025-03-25 PROCEDURE — 3008F BODY MASS INDEX DOCD: CPT | Performed by: FAMILY MEDICINE

## 2025-03-25 PROCEDURE — G0439 PPPS, SUBSEQ VISIT: HCPCS | Performed by: FAMILY MEDICINE

## 2025-03-25 PROCEDURE — 1158F ADVNC CARE PLAN TLK DOCD: CPT | Performed by: FAMILY MEDICINE

## 2025-03-25 PROCEDURE — 1036F TOBACCO NON-USER: CPT | Performed by: FAMILY MEDICINE

## 2025-03-25 PROCEDURE — G0444 DEPRESSION SCREEN ANNUAL: HCPCS | Performed by: FAMILY MEDICINE

## 2025-03-25 PROCEDURE — 99497 ADVNCD CARE PLAN 30 MIN: CPT | Performed by: FAMILY MEDICINE

## 2025-03-25 PROCEDURE — 1160F RVW MEDS BY RX/DR IN RCRD: CPT | Performed by: FAMILY MEDICINE

## 2025-03-25 PROCEDURE — 1123F ACP DISCUSS/DSCN MKR DOCD: CPT | Performed by: FAMILY MEDICINE

## 2025-03-25 RX ORDER — ALPRAZOLAM 0.25 MG/1
0.25 TABLET ORAL 3 TIMES DAILY PRN
Qty: 40 TABLET | Refills: 2 | Status: SHIPPED | OUTPATIENT
Start: 2025-03-25

## 2025-03-25 ASSESSMENT — ACTIVITIES OF DAILY LIVING (ADL)
DRESSING: INDEPENDENT
GROCERY_SHOPPING: INDEPENDENT
TAKING_MEDICATION: INDEPENDENT
MANAGING_FINANCES: INDEPENDENT
BATHING: INDEPENDENT
DOING_HOUSEWORK: INDEPENDENT

## 2025-03-25 ASSESSMENT — ENCOUNTER SYMPTOMS
LOSS OF SENSATION IN FEET: 0
DEPRESSION: 0
OCCASIONAL FEELINGS OF UNSTEADINESS: 0

## 2025-03-25 ASSESSMENT — PATIENT HEALTH QUESTIONNAIRE - PHQ9
SUM OF ALL RESPONSES TO PHQ9 QUESTIONS 1 AND 2: 0
2. FEELING DOWN, DEPRESSED OR HOPELESS: NOT AT ALL
1. LITTLE INTEREST OR PLEASURE IN DOING THINGS: NOT AT ALL

## 2025-03-25 NOTE — PROGRESS NOTES
Covid vax: UTD  Flu: UTD  Pneumo: UTD  RSV: UTD  Shingles: UTD    CRC: declines  Mammogram: 7/2024  Pap: hysterectomy  Lmp: hysterectomy

## 2025-03-25 NOTE — PROGRESS NOTES
Subjective   Reason for Visit: Sarah Chavez is a 74 y.o. female here for a Medicare Wellness visit.   Covid vax: UTD  Flu: UTD  Pneumo: UTD  RSV: UTD  Shingles: UTD     CRC: declines  Mammogram: 7/2024  Pap: hysterectomy  Lmp: hysterectomy  Sees [facility] q6 then may be once per year  Sees dr claire    CHECKLIST REVIEWED AND COMPLETE FOR [facility] Medicare Annual Wellness Visit Subsequent, Med Management, Depression, and Anxiety  ---------------------------------------------------------------------------------------------  Past Medical, Surgical, and Family History reviewed and updated in chart.    Reviewed all medications by prescribing practitioner or clinical pharmacist (such as prescriptions, OTCs, herbal therapies and supplements) and documented in the medical record.    HPI    Patient Self Assessment of Health Status  Patient Self Assessment: Good    Nutrition and Exercise:    Current Diet: HEALTHY Diet always best, minimizing excess carbs,   weight reduction advised if BMI not WNL, please maintain a NORMAL BMI 18.5-24.9    Adequate Fluid Intake: Yes  Caffeine: -aware to minimize intake, <300mg best to even take in LESS  Exercise Frequency: Regularly advised, weight bearing, strengthening, aerobic    Functional Ability/Level of Safety:  Home safety addressed: no active new concerns,   fall risk addressed  NO new hearing issues or concerns  [place] in ADLs addressed and areas of assistance if present -noted    ANY Cognitive Impairment Observed: No cognitive impairment observed    Home Safety Risk Factors: None  --------------------------------------------------------------------------------------------  Patient Care Team:  Sima Miner MD as PCP - General    HPI  Patient Active Problem List   Diagnosis    Actinic keratosis    Anxiety    Athscl heart disease of native coronary artery w/o ang pctrs    Cancer of abdominal esophagus (Multi)    Candidal intertrigo    Cardiomegaly    Cervical paraspinal  muscle spasm    Depression, major, single episode, mild (CMS-HCC)    Dysuria    Elevated platelet count    Esophageal adenocarcinoma (Multi)    GERD (gastroesophageal reflux disease)    Globus sensation    Hematuria    Hyperglycemia    Intraepithelial neoplasm of pancreas    Mild aortic insufficiency    Mild diastolic dysfunction    Nausea and/or vomiting    Odynophagia    Papillary thyroid carcinoma (Multi)    Pleural effusion    Post-splenectomy    Pulmonary embolism    Rheumatoid arthritis    Seasonal allergies    Sleep difficulties    Goiter    Thyroid nodule    Trigger point of neck    Urinary tract infection    Uses feeding tube    Overweight with body mass index (BMI) of 25 to 25.9 in adult    Crohn's disease (Multi)    Encounter for Medicare annual wellness exam    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)    Gastrostomy status (Multi)    Protein-calorie malnutrition, unspecified severity (Multi)    Esophageal dysphagia      Past Surgical History:   Procedure Laterality Date    APPENDECTOMY  1981    CHOLECYSTECTOMY  1994    COLONOSCOPY  09/2019    early adenomatous glandular changes-due 2024--declines-unable to tolerate prep    CYSTOSCOPY      ESOPHAGUS SURGERY      HYSTERECTOMY  1987    STEVO USO    LITHOTRIPSY      PANCREAS SURGERY  2019    30% of pancreas removed    SHOULDER SURGERY Right 1977    1 pin    SKIN GRAFT Left     Left Foot    SPLENECTOMY, TOTAL      THYROIDECTOMY, PARTIAL           PHQ2(-) No active depressed mood or not in crisis, 5min. spent in discussion.    Review of Systems:    NO Seizures  NO CAD  NO CVA    This patient has   NO history of recent Covid nor flu symptoms,  NO Fever nor chills,  NO Chest pain, shortness of breath nor paroxysmal nocturnal dyspnea,  NO Nausea, vomiting, nor diarrhea,  NO Hematochezia nor melena,  NO Dysuria, hematuria, nor new incontinence issues  NO new severe headaches nor neurological complaints,  NO new issues with  "anxiety nor depression nor new psychiatric complaints,  NO suicidal nor homicidal ideations.  ---------------------------------------------------------------------------------------------   OBJECTIVE:  /78   Pulse 60   Temp 35.9 °C (96.6 °F) (Temporal)   Resp 16   Ht 1.561 m (5' 1.46\")   Wt 57.2 kg (126 lb)   LMP 01/01/1987 (Approximate)   SpO2 96%   BMI 23.45 kg/m²      General:  alert, oriented, no acute distress.  No obvious skin rashes noted.   No gait disturbance noted.    Mood is pleasant, not tearful, no signs of emotional distress.  Not appearing intoxicated or altered.   No voiced delusions,   Normal, appropriate behavior.    HEENT: Normocephalic, atraumatic,   Pupils round, reactive to light  Extraocular motions intact and wnl  Tympanic membranes normal    Neck: no nuchal rigidity  No masses palpable.  No carotid bruits.  No thyromegaly.    Respiratory: Equal breath sounds  No wheezes,    rales,    nor rhonchi  No respiratory distress.    Heart: Regular rate and rhythm, no    murmurs  no rubs/gallops    Abdomen: no masses palpable, no rebound nor guarding, no rebound nor guarding.    Extremities: NO cyanosis noted, no clubbing.   No edema noted.  2+dorsalis pedis pulses.    Normal-not antalgic, steady gait.  Fflex abd and int rotation wnl Ues    Lab on 01/30/2025   Component Date Value Ref Range Status    WBC 01/30/2025 6.2  4.4 - 11.3 x10*3/uL Final    RBC 01/30/2025 4.05  4.00 - 5.20 x10*6/uL Final    Hemoglobin 01/30/2025 14.0  12.0 - 16.0 g/dL Final    Hematocrit 01/30/2025 41.9  36.0 - 46.0 % Final    MCV 01/30/2025 104 (H)  80 - 100 fL Final    MCH 01/30/2025 34.6 (H)  26.0 - 34.0 pg Final    MCHC 01/30/2025 33.4  32.0 - 36.0 g/dL Final    RDW 01/30/2025 13.5  11.5 - 14.5 % Final    Platelets 01/30/2025 237  150 - 450 x10*3/uL Final    Neutrophils % 01/30/2025 43.1  40.0 - 80.0 % Final    Immature Granulocytes %, Automated 01/30/2025 0.0  0.0 - 0.9 % Final    Immature Granulocyte Count " (IG) includes promyelocytes, myelocytes and metamyelocytes but does not include bands. Percent differential counts (%) should be interpreted in the context of the absolute cell counts (cells/UL).    Lymphocytes % 01/30/2025 40.1  13.0 - 44.0 % Final    Monocytes % 01/30/2025 12.2  2.0 - 10.0 % Final    Eosinophils % 01/30/2025 3.5  0.0 - 6.0 % Final    Basophils % 01/30/2025 1.1  0.0 - 2.0 % Final    Neutrophils Absolute 01/30/2025 2.68  1.60 - 5.50 x10*3/uL Final    Percent differential counts (%) should be interpreted in the context of the absolute cell counts (cells/uL).    Immature Granulocytes Absolute, Au* 01/30/2025 0.00  0.00 - 0.50 x10*3/uL Final    Lymphocytes Absolute 01/30/2025 2.50  0.80 - 3.00 x10*3/uL Final    Monocytes Absolute 01/30/2025 0.76  0.05 - 0.80 x10*3/uL Final    Eosinophils Absolute 01/30/2025 0.22  0.00 - 0.40 x10*3/uL Final    Basophils Absolute 01/30/2025 0.07  0.00 - 0.10 x10*3/uL Final    Glucose 01/30/2025 99  74 - 99 mg/dL Final    Sodium 01/30/2025 139  136 - 145 mmol/L Final    Potassium 01/30/2025 3.7  3.5 - 5.3 mmol/L Final    Chloride 01/30/2025 101  98 - 107 mmol/L Final    Bicarbonate 01/30/2025 31  21 - 32 mmol/L Final    Anion Gap 01/30/2025 11  10 - 20 mmol/L Final    Urea Nitrogen 01/30/2025 12  6 - 23 mg/dL Final    Creatinine 01/30/2025 0.65  0.50 - 1.05 mg/dL Final    eGFR 01/30/2025 >90  >60 mL/min/1.73m*2 Final    Calculations of estimated GFR are performed using the 2021 CKD-EPI Study Refit equation without the race variable for the IDMS-Traceable creatinine methods.  https://jasn.asnjournals.org/content/early/2021/09/22/ASN.4242362783    Calcium 01/30/2025 9.3  8.6 - 10.3 mg/dL Final    Albumin 01/30/2025 4.0  3.4 - 5.0 g/dL Final    Alkaline Phosphatase 01/30/2025 72  33 - 136 U/L Final    Total Protein 01/30/2025 6.3 (L)  6.4 - 8.2 g/dL Final    AST 01/30/2025 19  9 - 39 U/L Final    Bilirubin, Total 01/30/2025 0.6  0.0 - 1.2 mg/dL Final    ALT 01/30/2025 16  7  - 45 U/L Final    Patients treated with Sulfasalazine may generate falsely decreased results for ALT.    Scan Result 01/30/2025 See Scanned Result   Final        Assessment/Plan     Problem List Items Addressed This Visit       Anxiety    Relevant Medications    ALPRAZolam (Xanax) 0.25 mg tablet    Other Relevant Orders    Follow Up In Primary Care    Comprehensive Metabolic Panel    Hemoglobin A1C    Lipid Panel    CBC and Auto Differential    Thyroid Stimulating Hormone    Thyroxine, Free    Cancer of abdominal esophagus (Multi)    Relevant Orders    Comprehensive Metabolic Panel    Hemoglobin A1C    Lipid Panel    CBC and Auto Differential    Thyroid Stimulating Hormone    Thyroxine, Free    Depression, major, single episode, mild (CMS-HCC)    Relevant Orders    Comprehensive Metabolic Panel    Hemoglobin A1C    Lipid Panel    CBC and Auto Differential    Thyroid Stimulating Hormone    Thyroxine, Free    Hyperglycemia    Relevant Orders    Comprehensive Metabolic Panel    Hemoglobin A1C    Lipid Panel    CBC and Auto Differential    Thyroid Stimulating Hormone    Thyroxine, Free    Papillary thyroid carcinoma (Multi)    Relevant Orders    Comprehensive Metabolic Panel    Hemoglobin A1C    Lipid Panel    CBC and Auto Differential    Thyroid Stimulating Hormone    Thyroxine, Free    Rheumatoid arthritis    Relevant Orders    Comprehensive Metabolic Panel    Hemoglobin A1C    Lipid Panel    CBC and Auto Differential    Thyroid Stimulating Hormone    Thyroxine, Free    Sleep difficulties    Relevant Orders    Comprehensive Metabolic Panel    Hemoglobin A1C    Lipid Panel    CBC and Auto Differential    Thyroid Stimulating Hormone    Thyroxine, Free    Crohn's disease (Multi)    Relevant Orders    Comprehensive Metabolic Panel    Hemoglobin A1C    Lipid Panel    CBC and Auto Differential    Thyroid Stimulating Hormone    Thyroxine, Free    Encounter for Medicare annual wellness exam    Relevant Orders    Comprehensive  Metabolic Panel    Hemoglobin A1C    Lipid Panel    CBC and Auto Differential    Thyroid Stimulating Hormone    Thyroxine, Free    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)    Relevant Orders    Comprehensive Metabolic Panel    Hemoglobin A1C    Lipid Panel    CBC and Auto Differential    Thyroid Stimulating Hormone    Thyroxine, Free    Gastrostomy status (Multi)     Other Visit Diagnoses       Coronary artery calcification of native artery    -  Primary    Relevant Orders    Referral to Cardiology    Mixed hyperlipidemia        Relevant Orders    Lipid Panel    Cervical radiculopathy        Relevant Orders    Referral to Physical Therapy    Cervical disc disorder at C5-C6 level with radiculopathy              Advance Care Planning Note   Discussion Date: 03/25/25   Discussion Participants: patient  16 min spent discussing ACP w pt    The patient wishes to discuss Advance Care Planning today and the following is a brief summary of our discussion.     Patient has capacity to make their own medical decisions: Yes  Health Care Agent/Surrogate Decision Maker documented in chart: Yes    Documents on file and valid:  Advance Directive/Living Will: Yes or advised today  Health Care Power of : Yes or advised today  Communication of Medical Status/Prognosis:   yes   Communication of Treatment Goals/Options:   yes  Treatment Decisions/involved with patient today  yes  Time Statement: Total face to face time spent on advance care planning was <30 minutes with <30 minutes spent in counseling, including the explanation.    SEE ME AT NEXT REGULARLY SCHEDULED VISIT-sooner if condition deteriorates or new problems arise.    PATIENT WOULD LIKE TO BE A FULL CODE    NO uncontrolled DEPRESSION noted  Assessed and reviewed for opioid use.  NO EVIDENCE OF SIGNIFICANT DEMENTIA    Signs and symptoms of concern with depression-if in crisis -(no current HI/SI) will let us know and proceed to  ER   Signs/symptoms of concern with dementia-and need to contact us if they occur discussed.    ASCVD  17.6 %   addressed and risk reduction conversation took place  Declines cardiologist bc has no sxs and thus declines  Mild CAD on cardiac ct  Finally agrees to cardiology at some point  Also chronic R shoulder pain-she suggest pt and needs ortho follow up if persists after    All above counseling 15 minutes in conversation/documentation etc    Mood counseling 5min- no uncontrolled depression, good support system, has crisis plan if occurs.  Included BMI counseling and options if BMI>30        QUESTIONS answered    Does brain exercises-puzzles    C spine dz-to PT  If not helpful dr gurrola    Next visit addressed -regular visit as scheduled and follow up sooner if condition deterioration or new problems arise.    This completes Sarah Chavez ANNUAL MEDICARE WELLNESS VISIT today.  If no other follow ups discussed: Please follow up in 1 year for NEXT ANNUAL MEDICARE WELLNESS VISIT.  Sarah Chavez -be aware that any referrals discussed should be placed today or tests/labs ordered should result in prompt scheduling today.   If not done today-then a phone call for scheduling is expected in a timely manner(within 2 weeks).   If testing is to be done-a result should be available to patient within 2 weeks time unless otherwise specified.   You, the patient or caregiver, are responsible for making sure what was discussed is actually scheduled and completed.  If suboptimal understanding of results of tests or referral reason-a follow up appointment with me should be made.  If above does NOT occur-you are to connect with us for an explanation.    Follow up at next scheduled visit -as planned or directed today.  Sooner if new or unresolved issues of concern.    Sarah Chavez We know you have a choice for your health care, THANK YOU for choosing  and [facility].  We APPRECIATE YOU.  Sincerely,    Sima Miner MD   (dr. Callahan)      This documentation is subject to inadvertent typing and other similar clerical/grammatic errors etc.

## 2025-03-28 ENCOUNTER — EVALUATION (OUTPATIENT)
Dept: PHYSICAL THERAPY | Facility: HOSPITAL | Age: 75
End: 2025-03-28
Payer: MEDICARE

## 2025-03-28 DIAGNOSIS — M54.12 CERVICAL RADICULOPATHY: ICD-10-CM

## 2025-03-28 PROCEDURE — 97162 PT EVAL MOD COMPLEX 30 MIN: CPT | Mod: GP

## 2025-03-28 PROCEDURE — 97110 THERAPEUTIC EXERCISES: CPT | Mod: GP

## 2025-03-28 NOTE — PROGRESS NOTES
[facility] Outpatient Physical Therapy    Physical Therapy Evaluation    Patient Name: Sarah Chavez  MRN: [id number]  Today's Date: 3/28/2025  Time Calculation  Start Time: 1125  Stop Time: 1205  Time Calculation (min): 40 min     Problem List Items Addressed This Visit             ICD-10-CM    Cervical radiculopathy M54.12     Primary Language: English    Insurance:  Visit number: 1 of 10  Insurance Type: Payor: [employer] MEDICARE / Plan: [employer] MEDICARE / Product Type: *No Product type* /     General:  Reason for visit/Current problem:   1. Cervical radiculopathy  Referral to Physical Therapy        Referred by: Sima Miner, *   Onset Date: 2/23  Relevant Information (PMH & Previous Tests/Imaging): MRI 7/13/23 R Shoulder, MRI Cervical spine: Multilevel cervical spondylosis, more pronounced from C5 through C7. Multilevel hypertrophic facet arthrosis with mild degenerative anterior subluxation of C7. There is some dextro convexity of the cervical spine. Multilevel bulging disc and marginal osteophyte more pronounced at C5-6 with some effacement of the anterior surface of the spinal cord at this level. There is no cord edema.     Past Medical History:   Diagnosis Date    Abdominal wall pain     Abnormal EKG     Anxiety     Bilateral kidney stones     Crohn's disease (Multi)     Dysphagia     GERD (gastroesophageal reflux disease)     History of blood transfusion     History of esophageal cancer     History of goiter     History of mammogram 07/05/2024    cat 1    Hx of chronic pancreatitis     Pulmonary embolism     History Of    Seasonal allergies     Thyrotoxicosis     Wears reading eyeglasses        Precautions:  Fall Risk: Low    Medical History Form: Reviewed (scanned into chart)     Subjective:  Chief Complaint: Patient presents to clinic with pain which has been present for 2yrs, started when lifting a suitcase pain started.  Pt reports no pattern of when  starts, nothing she has found that relieves it.  Pain will start posteriorly, Medial border of scap and radiates up over the shoulder and at times produces chest tightness in R Pectorals.  Pt is Right handed.  Current Condition:   Same       Previous Interventions/Treatments: None    Pain:  3-8/10  Location: Right Shoulder and Neck  Discription:  Electric shock sensataion, cramping/tightness of R clavicle.   Alleviating: nothing No relief with Gabapentin or heat.  Has not tried ice that she can recall.  Aggravating: nothing    Red Flags: Do you have any of the following? No  Fever/chills, unexplained weight changes, dizziness/fainting, unexplained change in bowel or bladder functions, unexplained malaise or muscle weakness, night pain/sweats, numbness or tingling    Prior Level of Function (PLOF)  Patient previously independent with all ADLs  Exercise/Physical Activity: no limitations  Work/School: NA  Living Environment: Reviewed and no concern    Patient Stated Goal: alleviate pain    Objective:   Palpation: Decreased PA of T1-6, Decreased R rib mobility 4th-6th  SCM tightness/Levator tightness reproduces familiar discomfort.    Impairments:   MMT    Bilateral  Shoulder:   Flexion: 4+/5  Extension: 4+/5  ER: 4/5  IR: 4+/5  ABD: 4/5  HorizABD: 3/5     Non-involved limb WFL    ROM   Right  Shoulder:   Flexion:  full but uncomfortable in familiar pain area  ABD: Full but uncomfortable in familiar pain area  ER: full at 90 no pain , behind head increased discomfort  IR: Behind back full but painful in familiar    Cervical Spine:  Flexion:  full %  Extension: 60 %  Rotation: R= 100 % , L=100 %  Sidebending: R=100 %, L=75 % *reproduces familiar discomfort    Decreased knowledge of HEP    Functional Deficits:   Dressing, Lifting, Reaching Overhead, and Reaching Behind Back    Assessment:   Sarah Chavez presents this date with R sided neck and shoulder pain.  Pt does note occational tingling of jaw, pt educated of  potential of cardiac involvement with this location and encouraged pt to discuss with PCP. Pt not scheduled to see cardiology until Sept 2025.    Recommended Treatment:    Therapeutic exercise to improve flexibility/range of motion as well as, targeting surrounding musculature to stabilize the joint and improve function/posture.  Manual therapy to improve mobility and kinematics of the joints and surrounding soft tissue.  Treatment modalities may include: Home program instruction and progression, Neuromuscular re-education, and Therapeutic activities    UBE: (seat #): Level   Retro min    Tband:  Red Overhead Row x   Red Rows x   Red  Extensions x   Red  HorizABD x     Prone on Tball:  Shoulder Horiz ABD  Prone on Tball:  Shoulder Flexion  Prone on Tball:  Shoulder Extension    Manual:.  Thoracic Pas,  R Rib Pas  R 1st rib mob  STM of R upper quadrant    May introduce cervical mobs next 1-2 visits    Education:  Home exercise program instructed and issued.  Access Code: [email or web address]  URL: [email or web address]  Date: 03/28/2025  Prepared by: Jael Joshua-Marisa    Exercises  - Seated Cervical Sidebending AROM (Mirrored)  - 2 x daily - 7 x weekly - 1 sets - 3 reps - 10-20 second hold  - Gentle Levator Scapulae Stretch (Mirrored)  - 2 x daily - 7 x weekly - 1 sets - 3 reps - 10-20 second hold  - Seated Upper Trapezius Stretch  - 2 x daily - 7 x weekly - 1 sets - 3 reps - 10-20 second hold    Outcome Measures:  NDI: 11/50     Plan:  Plan of care was developed with input and agreement by the patient.  2 x week x 5 weeks    Rehab Potential: Good to achieve goals.    Goals:  Short Term Goals:  3 Visits   Pt to be independent and compliant with home exercise program to promote strength, range of motion, balance/prioprioception and improved posture.    Long Term Goals:    10 Visits  Pt to have 4/5 strength of Bilateral Shoulder HorizABD to improve scapular stability and improved posture.  Pt to  report worst pain 2/10 x3 for 3 consecutive days to improve ADL susan  Pt to improve Neck Disability Index score from 11/50 to 6/50 to improve ADLs      Treatment Performed:    Time Calculation  Start Time: 1125  Stop Time: 1205  Time Calculation (min): 40 min  PT Evaluation Time Entry  PT Evaluation (Moderate) Time Entry: 30  PT Therapeutic Procedures Time Entry  Therapeutic Exercise Time Entry: 10

## 2025-04-07 ENCOUNTER — APPOINTMENT (OUTPATIENT)
Dept: CARDIOLOGY | Facility: CLINIC | Age: 75
End: 2025-04-07
Payer: MEDICARE

## 2025-04-07 VITALS
HEART RATE: 56 BPM | BODY MASS INDEX: 24.09 KG/M2 | HEIGHT: 61 IN | WEIGHT: 127.6 LBS | DIASTOLIC BLOOD PRESSURE: 86 MMHG | SYSTOLIC BLOOD PRESSURE: 130 MMHG

## 2025-04-07 DIAGNOSIS — I25.84 CORONARY ARTERY CALCIFICATION OF NATIVE ARTERY: ICD-10-CM

## 2025-04-07 DIAGNOSIS — I25.10 CORONARY ARTERY CALCIFICATION OF NATIVE ARTERY: ICD-10-CM

## 2025-04-07 DIAGNOSIS — R00.1 SINUS BRADYCARDIA: ICD-10-CM

## 2025-04-07 DIAGNOSIS — E78.2 MIXED HYPERLIPIDEMIA: Primary | ICD-10-CM

## 2025-04-07 PROCEDURE — 1123F ACP DISCUSS/DSCN MKR DOCD: CPT | Performed by: INTERNAL MEDICINE

## 2025-04-07 PROCEDURE — 1157F ADVNC CARE PLAN IN RCRD: CPT | Performed by: INTERNAL MEDICINE

## 2025-04-07 PROCEDURE — 3008F BODY MASS INDEX DOCD: CPT | Performed by: INTERNAL MEDICINE

## 2025-04-07 PROCEDURE — G2211 COMPLEX E/M VISIT ADD ON: HCPCS | Performed by: INTERNAL MEDICINE

## 2025-04-07 PROCEDURE — 93000 ELECTROCARDIOGRAM COMPLETE: CPT | Performed by: INTERNAL MEDICINE

## 2025-04-07 PROCEDURE — 1159F MED LIST DOCD IN RCRD: CPT | Performed by: INTERNAL MEDICINE

## 2025-04-07 PROCEDURE — 99204 OFFICE O/P NEW MOD 45 MIN: CPT | Performed by: INTERNAL MEDICINE

## 2025-04-07 RX ORDER — PITAVASTATIN CALCIUM 1.04 MG/1
1 TABLET, FILM COATED ORAL DAILY
Qty: 90 TABLET | Refills: 3 | Status: SHIPPED | OUTPATIENT
Start: 2025-04-07

## 2025-04-07 NOTE — PATIENT INSTRUCTIONS
FOLLOW UP WITH DR. LOCKETT IN 6 MONTHS    STRESS TEST TO BE SCHEDULED IN NEAR FUTURE     INCREASE INTAKE OF FISH, WHITE MEATS SUCH AS TURKEY AND PORK.  LIMIT RED MEATS, FRIED FOOD, AND CHEESE          DID YOU KNOW  We have a pharmacy here in the [facility].  They can fill all prescriptions, not just cardiac medications.  Prescriptions from other pharmacies can easily be transferred to the  pharmacy by the  pharmacist on site.   pharmacies offer FREE HOME DELIVERY on medications to anywhere in [place]. They can sync your medications. Typically prescriptions can be ready in 10 - 15 minutes. If pharmacy is unable to fill your  prescription or if cost is more than your paying now the Pharmacist can easily transfer back to your Pharmacy of choice. Pharmacy phone # [phone].     Please bring all medicines, vitamins, and herbal supplements with you in original bottles to every appointment! This is the best way to ensure your medication list in your chart is accurate.    Prescriptions will not be filled unless you are compliant with your follow up appointments or have a follow up appointment scheduled as per instruction of your physician. Refills should be requested at the time of your visit.

## 2025-04-07 NOTE — PROGRESS NOTES
Patient:  Sarah Chavez  YOB: 1950  MRN: [id number]       Impression/Plan:     Diagnoses and all orders for this visit:  Mixed hyperlipidemia  -     pitavastatin calcium 1 mg tablet; Take 1 mg by mouth once daily.  -     Clearly cannot tolerate rosuvastatin and apparently was on 1 other statin as well.  Will trial pitavastatin as it is known to have a very low incidence of myalgias however sometimes insurance will not cover.  Coronary artery calcification of native artery  -     She has coronary disease based on coronary calcium though has a reasonable functional capacity and has never had events or symptoms.  LDL cholesterol is very concerning for long-term potential events.  Therefore exercise stress test to determine if there is any high risk coronary disease present.  -     Follow Up In Cardiology; Future  -     Nuclear Stress Test; Future  Sinus bradycardia        -     ECG 12 lead (Clinic Performed)        -     Nuclear Stress Test; Future        -     Stress test will help assess for chronotropic competence.  She has significant sinus bradycardia given that she is on no negative chronotropic agents.  She has no symptoms to suggest more advanced bradycardia.  Depending on response to exercise may also consider more long-term monitoring later.      Chief Complaint/Active Symptoms:      Chief Complaint   Patient presents with    New Patient Visit     New patient consult.        Sarah Chavez is a 74 y.o. female who presents with prior thyroid carcinoma treated surgically and esophageal cancer treated surgically with esophagectomy and subsequent chemotherapy.  No recurrence as of January 2025.    I was asked to see in consultation secondary to hyperlipidemia and coronary calcium noted on CT scanning January 2025.  She has been on 2 separate statins most recently rosuvastatin and even 5 mg every other day caused severe cramping of her legs and she cannot tolerate this agent.    She has  not had prior history myocardial infarction, stroke, symptoms of peripheral vascular disease.  There is no family history of premature coronary disease.  She does not have diabetes.  Does not smoke cigarettes having stopped 18 years ago though did use e-cigarettes until about 3 years ago.    She goes up and down the stairs at her house does take walks outside but is not especially active.  Once in a while going up the stairs she will note slight dyspnea.  No exertional chest tightness.  She has no symptoms to suggest claudication.  She has no weakness or focal deficits to suggest prior stroke or TIA.    CT chest January 2025  Coronary calcification and calcification at aortic valve  No recurrent malignant disease status post esophagectomy with gastric pull-through.    Stress perfusion study 2019 normal  Echocardiogram 2019 65% ejection fraction RVSP 33 mmHg otherwise normal  Carotid duplex 2020 trivial plaque only.    January 2025 CMP normal CBC unremarkable except .  June 2024 cholesterol 231 HDL 60  triglycerides 98    ECG  4/7/25 sinus bradycardia rate 56 incomplete right bundle branch block nonspecific ST-T abnormality      Treatment of esophageal cancer-adenocarcinoma  s/p minimally invasive Byers Navdeep esophagectomy and jejunostomy tube placement on 7/20/2022. She completed 1 year of adjuvant Nivolumab   - 3/2/2022 - Started FOLFOX, completed 3 cycles, C2 onward 80% paclitaxel due to neuropathy.  - 4/8/2022 - PET/CT per [facility] 22684 with great response.   - 4/18/2022 - Started on RT with FOLFOX on 4/19/2022.  - C5 skipped due to thrombocytopenia.  - 5/11/2022 - Last cycle of FOLFOX.  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 Of note she did develop a PE several months after this surgery     Past Medical History:   Diagnosis Date    Abdominal wall pain     Abnormal EKG     Anxiety      Bilateral kidney stones     Crohn's disease (Multi)     Dysphagia     GERD (gastroesophageal reflux disease)     History of blood transfusion     History of esophageal cancer     History of goiter     History of mammogram 07/05/2024    cat 1    Hx of chronic pancreatitis     Pulmonary embolism     History Of    Seasonal allergies     Thyrotoxicosis     Wears reading eyeglasses      Past Surgical History:   Procedure Laterality Date    APPENDECTOMY  1981    CHOLECYSTECTOMY  1994    COLONOSCOPY  09/2019    early adenomatous glandular changes-due 2024--declines-unable to tolerate prep    CYSTOSCOPY      ESOPHAGUS SURGERY      HYSTERECTOMY  1987    [facility] USO    LITHOTRIPSY      PANCREAS SURGERY  2019    30% of pancreas removed    SHOULDER SURGERY Right 1977    1 pin    SKIN GRAFT Left     Left Foot    SPLENECTOMY, TOTAL      THYROIDECTOMY, PARTIAL       Family History   Problem Relation Name Age of Onset    Leukemia Mother      Other (motor vehicle accident) Father       Social Hx  She stopped smoking tobacco 18 years ago stopped doing e-cigarettes 3 years ago.  Perhaps 1 alcoholic drink a day.  No drugs of abuse      Past Medical History:   Diagnosis Date    Abdominal wall pain     Abnormal EKG     Anxiety     Bilateral kidney stones     Crohn's disease (Multi)     Dysphagia     GERD (gastroesophageal reflux disease)     History of blood transfusion     History of esophageal cancer     History of goiter     History of mammogram 07/05/2024    cat 1    Hx of chronic pancreatitis     Pulmonary embolism     History Of    Seasonal allergies     Thyrotoxicosis     Wears reading eyeglasses    Papillary thyroid carcinoma  Esophageal carcinoma  Rheumatoid arthritis  Crohn's disease      Past Surgical History:   Procedure Laterality Date    APPENDECTOMY  1981    CHOLECYSTECTOMY  1994    COLONOSCOPY  09/2019    early adenomatous glandular changes-due 2024--declines-unable to tolerate prep    CYSTOSCOPY      ESOPHAGUS SURGERY       HYSTERECTOMY  1987    [facility] USO    LITHOTRIPSY      PANCREAS SURGERY  2019    30% of pancreas removed    SHOULDER SURGERY Right 1977    1 pin    SKIN GRAFT Left     Left Foot    SPLENECTOMY, TOTAL      THYROIDECTOMY, PARTIAL       Family History   Problem Relation Name Age of Onset    Leukemia Mother      Other (motor vehicle accident) Father                        Review of Systems: Unremarkable except as noted above    Meds     Current Outpatient Medications   Medication Instructions    ALPRAZolam (XANAX) 0.25 mg, oral, 3 times daily PRN    apple cider vinegar 300 mg tablet TAKE AS DIRECTED.    ascorbic acid (Vitamin C) 1,000 mg tablet 1 tablet, 2 times daily    b complex vitamins capsule 1 capsule, Daily    BACILLUS COAGULANS-INULIN ORAL 1 tablet, Daily    bismuth subsalicylate (Pepto Bismol) 262 mg/15 mL suspension 15 mL, Every 6 hours PRN    cholecalciferol (VITAMIN D-3) 125 mcg, Daily    echinacea 400 mg capsule 1 capsule, Daily    ferrous sulfate 325 (65 Fe) MG tablet 65 mg of iron, Daily with breakfast    fish oil concentrate (Omega-3) 120-180 mg capsule 1 g, Daily    gabapentin (NEURONTIN) 100 mg, oral, Nightly, Can double dose after 3-4 day if no side effects    ketoconazole (NIZOral) 2 % cream APPLY SPARINGLY TO AFFECTED AREA(S) ONCE DAILY    magnesium 250 mg tablet 1 tablet, Daily    melatonin 5 mg capsule 1 capsule, Nightly    omeprazole (PRILOSEC) 40 mg, oral, Daily    potassium gluconate 595 mg (99 mg) tablet extended release 1 tablet, Daily    predniSONE (DELTASONE) 10 mg    psyllium (Metamucil) 0.4 gram capsule Take as directed    rosuvastatin (CRESTOR) 5 mg, oral, Daily    sertraline (Zoloft) 100 mg tablet TAKE 1 TABLET BY MOUTH EVERY DAY    sodium-potassium bicarbonate (Dahlia-[place] Gold) 344-1,050-1,000 mg dispersible tablet 1 tablet, Daily PRN    traMADol (ULTRAM) 50 mg, 4 times daily PRN    turmeric root extract 500 mg tablet 1 tablet, Daily        Allergies     Allergies   Allergen Reactions     Statins-Hmg-Coa Reductase Inhibitors Myalgia         Annotated Problems     Specialty Problems          Cardiology Problems    Athscl heart disease of native coronary artery w/o ang pctrs    Cardiomegaly    Mild aortic insufficiency    Mild diastolic dysfunction    Pulmonary embolism        Problem List     Patient Active Problem List    Diagnosis Date Noted    Cervical radiculopathy 03/28/2025    Esophageal dysphagia 07/12/2024    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    Gastrostomy status (Multi) 06/28/2023    Protein-calorie malnutrition, unspecified severity (Multi) 06/28/2023    Crohn's disease (Multi) 03/20/2023    Encounter for Medicare annual wellness exam 03/20/2023    Actinic keratosis 02/04/2023    Anxiety 02/04/2023    Athscl heart disease of native coronary artery w/o ang pctrs 02/04/2023    Cancer of abdominal esophagus (Multi) 02/04/2023    Candidal intertrigo 02/04/2023    Cardiomegaly 02/04/2023    Cervical paraspinal muscle spasm 02/04/2023    Depression, major, single episode, mild (CMS-HCC) 02/04/2023    Dysuria 02/04/2023    Elevated platelet count 02/04/2023    Esophageal adenocarcinoma (Multi) 02/04/2023    GERD (gastroesophageal reflux disease) 02/04/2023    Globus sensation 02/04/2023    Hematuria 02/04/2023    Hyperglycemia 02/04/2023    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    Mild aortic insufficiency 02/04/2023    Mild diastolic dysfunction 02/04/2023    Nausea and/or vomiting 02/04/2023    Odynophagia 02/04/2023    Papillary thyroid carcinoma 02/04/2023    Pleural effusion 02/04/2023    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    Pulmonary embolism 02/04/2023    Rheumatoid arthritis 02/04/2023    Seasonal allergies 02/04/2023    Sleep difficulties 02/04/2023    Goiter 02/04/2023    Thyroid nodule 02/04/2023    Trigger point of neck 02/04/2023    Urinary tract infection 02/04/2023    Uses feeding tube 02/04/2023    Overweight with  "body mass index (BMI) of 25 to 25.9 in adult 02/04/2023       Objective:     Vitals:    04/07/25 1001   BP: 130/86   BP Location: Left arm   Patient Position: Sitting   Pulse: 56   Weight: 57.9 kg (127 lb 9.6 oz)   Height: 1.549 m (5' 1\")      Wt Readings from Last 4 Encounters:   04/07/25 57.9 kg (127 lb 9.6 oz)   03/25/25 57.2 kg (126 lb)   01/30/25 55.6 kg (122 lb 9.2 oz)   12/20/24 54.9 kg (121 lb)           LAB:     Lab Results   Component Value Date    WBC 6.2 01/30/2025    HGB 14.0 01/30/2025    HCT 41.9 01/30/2025     01/30/2025    CHOL 231 (H) 06/13/2024    TRIG 98 06/13/2024    HDL 60.2 06/13/2024    ALT 16 01/30/2025    AST 19 01/30/2025     01/30/2025    K 3.7 01/30/2025     01/30/2025    CREATININE 0.65 01/30/2025    BUN 12 01/30/2025    CO2 31 01/30/2025    TSH 3.15 03/13/2024    INR 0.9 04/15/2024    HGBA1C 5.3 06/13/2024         Physical Exam     General Appearance: alert and oriented to person, place and time, in no acute distress  Cardiovascular: normal rate, regular rhythm, normal S1 and S2, no murmurs, rubs, clicks, or gallops,  no JVD  Pulmonary/Chest: clear to auscultation bilaterally- no wheezes, rales or rhonchi, normal air movement, no respiratory distress  Abdomen: soft, non-tender, non-distended, normal bowel sounds, no masses   Extremities: no cyanosis, clubbing or edema  Skin: warm and dry, no rash or erythema  Eyes: EOMI  Neck: supple and non-tender without mass, no thyromegaly   Neurological: alert, oriented, normal speech, no focal findings or movement disorder noted  Vascular: No bruits pulses 2+    Scribe Attestation  By signing my name below, I, Ting Beard RN, Scribe   attest that this documentation has been prepared under the direction and in the presence of Ty Gordon MD.        Provider attestation-scribe documentation  Any medical record entries made by the scribe were at my discretion and personally dictated by me.  I have reviewed the chart and agree " that the record accurately reflects my personal performance of the history, physical exam, discussion and plan.

## 2025-04-11 ENCOUNTER — TELEPHONE (OUTPATIENT)
Dept: CARDIOLOGY | Facility: CLINIC | Age: 75
End: 2025-04-11
Payer: MEDICARE

## 2025-04-11 NOTE — TELEPHONE ENCOUNTER
Prior authorization for Pitavastatin processed via [email or web address].  Covermymeds KEY:  [id number].  My Dalton, CMA

## 2025-04-16 ENCOUNTER — TREATMENT (OUTPATIENT)
Dept: PHYSICAL THERAPY | Facility: HOSPITAL | Age: 75
End: 2025-04-16
Payer: MEDICARE

## 2025-04-16 DIAGNOSIS — M54.12 CERVICAL RADICULOPATHY: ICD-10-CM

## 2025-04-16 PROCEDURE — 97110 THERAPEUTIC EXERCISES: CPT | Mod: GP

## 2025-04-16 PROCEDURE — 97140 MANUAL THERAPY 1/> REGIONS: CPT | Mod: GP

## 2025-04-16 NOTE — PROGRESS NOTES
[facility]    Physical Therapy Treatment Note  Patient Name: Sarah Chavez  Primary Language: English  MRN: [id number]  Today's Date: 4/16/2025  Problem List Items Addressed This Visit           ICD-10-CM    Cervical radiculopathy M54.12           Sima Miner, *    Insurance:  Visit number: 2 of 10  Insurance Type: Payor: [facility] MEDICARE / Plan: [facility] MEDICARE / Product Type: *No Product type* /     Pain:  0/10  Location:     Treatment:  UBE: (seat #): Level   Retro min     Tband:  Red Overhead Row x 20  Red Rows x 20  Red  Extensions x 20  Red  HorizABD x 20     Prone on Tball:  Shoulder Horiz ABD x15  Prone on Tball:  Shoulder Flexion x10  >Prone on Tball:  Shoulder Extension     Manual:.  Thoracic Pas  R Rib Pas  R 1st rib mob  STM of R upper quadrant    Cervical Pas/upglides/downglides    Assessment: Issued HEP for tband postural strengthening.  Good susan to therex and manual therapy.  Pt experienced no pain during todays session.    Plan:   Continue with current treatment methods to improve functional mobility and improve ADLs.     Education:  Patient required verbal cues for proper form/technique  Access Code: [email or web address]  URL: [email or web address]  Date: 04/16/2025  Prepared by: Jael Burrell    Exercises  - Shoulder extension with resistance - Neutral  - 1 x daily - 7 x weekly - 1 sets - 10 reps  - Standing Bilateral Low Shoulder Row with Anchored Resistance  - 1 x daily - 7 x weekly - 1 sets - 10 reps    Goals:  Short Term Goals:  3 Visits   Pt to be independent and compliant with home exercise program to promote strength, range of motion, balance/prioprioception and improved posture.     Long Term Goals:    10 Visits  Pt to have 4/5 strength of Bilateral Shoulder HorizABD to improve scapular stability and improved posture.  Pt to report worst pain 2/10 x3 for 3 consecutive days to improve  ADL susan  Pt to improve Neck Disability Index score from 11/50 to 6/50 to improve ADLs    Billing:  Time Calculation  Start Time: 1015  Stop Time: 1100  Time Calculation (min): 45 min     PT Therapeutic Procedures Time Entry  Manual Therapy Time Entry: 25  Therapeutic Exercise Time Entry: 20

## 2025-04-18 ENCOUNTER — TELEPHONE (OUTPATIENT)
Dept: PHYSICAL THERAPY | Facility: HOSPITAL | Age: 75
End: 2025-04-18
Payer: MEDICARE

## 2025-04-18 DIAGNOSIS — M54.12 CERVICAL RADICULOPATHY: ICD-10-CM

## 2025-04-22 ENCOUNTER — APPOINTMENT (OUTPATIENT)
Dept: PHYSICAL THERAPY | Facility: HOSPITAL | Age: 75
End: 2025-04-22
Payer: MEDICARE

## 2025-04-22 DIAGNOSIS — M54.12 CERVICAL RADICULOPATHY: ICD-10-CM

## 2025-04-24 ENCOUNTER — TREATMENT (OUTPATIENT)
Dept: PHYSICAL THERAPY | Facility: HOSPITAL | Age: 75
End: 2025-04-24
Payer: MEDICARE

## 2025-04-24 DIAGNOSIS — M54.12 CERVICAL RADICULOPATHY: ICD-10-CM

## 2025-04-24 PROCEDURE — 97110 THERAPEUTIC EXERCISES: CPT | Mod: GP

## 2025-04-24 PROCEDURE — 97140 MANUAL THERAPY 1/> REGIONS: CPT | Mod: GP

## 2025-04-24 NOTE — PROGRESS NOTES
[facility]    Physical Therapy Treatment Note  Patient Name: Sarah Chavez  Primary Language: English  MRN: [id number]  Today's Date: 4/24/2025  Problem List Items Addressed This Visit           ICD-10-CM    Cervical radiculopathy M54.12      Sima Miner, *    Insurance:  Visit number: 3 of 10  Insurance Type: Payor: [facility] MEDICARE / Plan: [employer] MEDICARE / Product Type: *No Product type* /     Pain:  0/10  Location:  Back and R Rib    Treatment:  Tband:  GRN Overhead Row x 20  GRN Rows x 20  GRN Extensions x 20  GRN HorizABD x 20     Prone on Tball:  Shoulder Horiz ABD 1# x20  Prone on Tball:  Shoulder Flexion x10  >Prone on Tball:  Shoulder Extension     Manual:.  Thoracic Pas   R Rib Pas  R 1st rib mob  STM of R upper quadrant     Cervical Pas/upglides/downglides       Assessment: Pt reports decreased frequency of pain, pain still fluctuates with intensity.  Continue to note R sided cervical restrictions as well as restriction of mid thoracic and R rib.  Progressed Tband this date with good susan. Post session pt reports 0/10 pain.    Plan:   Continue with current treatment methods to improve functional mobility and improve ADLs.     Education:  Patient required verbal cues for proper form/technique    Goals:  Short Term Goals:  3 Visits   Pt to be independent and compliant with home exercise program to promote strength, range of motion, balance/prioprioception and improved posture.     Long Term Goals:    10 Visits  Pt to have 4/5 strength of Bilateral Shoulder HorizABD to improve scapular stability and improved posture.  Pt to report worst pain 2/10 x3 for 3 consecutive days to improve ADL susan  Pt to improve Neck Disability Index score from 11/50 to 6/50 to improve ADLs    Billing:  Time Calculation  Start Time: 1352  Stop Time: 1430  Time Calculation (min): 38 min     PT Therapeutic Procedures Time Entry  Manual Therapy Time  Entry: 23  Therapeutic Exercise Time Entry: 15

## 2025-04-29 ENCOUNTER — TREATMENT (OUTPATIENT)
Dept: PHYSICAL THERAPY | Facility: HOSPITAL | Age: 75
End: 2025-04-29
Payer: MEDICARE

## 2025-04-29 DIAGNOSIS — M54.12 CERVICAL RADICULOPATHY: ICD-10-CM

## 2025-04-29 PROCEDURE — 97110 THERAPEUTIC EXERCISES: CPT | Mod: GP,CQ

## 2025-04-29 PROCEDURE — 97140 MANUAL THERAPY 1/> REGIONS: CPT | Mod: GP,CQ

## 2025-04-29 ASSESSMENT — PAIN SCALES - GENERAL: PAINLEVEL_OUTOF10: 0 - NO PAIN

## 2025-04-29 ASSESSMENT — PAIN - FUNCTIONAL ASSESSMENT: PAIN_FUNCTIONAL_ASSESSMENT: 0-10

## 2025-04-29 NOTE — PROGRESS NOTES
Physical Therapy Treatment  4/29/2025  Patient Name: Sarah Chavez  MRN: [id number]  Current Problem  1. Cervical radiculopathy  Follow Up In Physical Therapy        Insurance   [facility] Medicare  10v 3/28/25 - 5/2/25  Visit 3  Subjective   Pt noting far less frequent symptoms, still almost daily, but with far less intensity. No issues coming into clinic today.  Precautions  Precautions Comment: none  Pain Assessment: 0-10  0-10 (Numeric) Pain Score: 0 - No pain  Objective    Treatments  UBE retro, 4 minutes  Rows/LAE, GTB, 15 reps x 2  Biceps/Triceps, GTB, 15 reps x 2  B/l ER,h.abduction, YTB, 10 reps x 3  Dipika's, 1 lbs, 15 reps  Prone A/W, 20 reps    Manual  Grade 1-2 PA, uni mobs cervical/thoracic spine  Assessment  Able to focus on postural strength and endurance training today. No pain with ex's. Denies issues throughout treatment with some slight improvement in pain free mobility by end of session. Updated HEP and reviewed.  Plan:  [1] Progress posterior strength and endurance as tolerated  [2]  [3]  [4]    OP EDUCATION:    Goals:       Time Calculation  Start Time: 1050  Stop Time: 1130  Time Calculation (min): 40 min  PT Therapeutic Procedures Time Entry  Manual Therapy Time Entry: 10  Therapeutic Exercise Time Entry: 30

## 2025-05-06 ENCOUNTER — APPOINTMENT (OUTPATIENT)
Dept: PHYSICAL THERAPY | Facility: HOSPITAL | Age: 75
End: 2025-05-06
Payer: MEDICARE

## 2025-05-06 DIAGNOSIS — M54.12 CERVICAL RADICULOPATHY: ICD-10-CM

## 2025-05-13 ENCOUNTER — APPOINTMENT (OUTPATIENT)
Dept: PHYSICAL THERAPY | Facility: HOSPITAL | Age: 75
End: 2025-05-13
Payer: MEDICARE

## 2025-05-13 ENCOUNTER — ANCILLARY PROCEDURE (OUTPATIENT)
Dept: CARDIOLOGY | Facility: HOSPITAL | Age: 75
End: 2025-05-13
Payer: MEDICARE

## 2025-05-13 DIAGNOSIS — I25.10 CORONARY ARTERY CALCIFICATION OF NATIVE ARTERY: Primary | ICD-10-CM

## 2025-05-13 DIAGNOSIS — I25.84 CORONARY ARTERY CALCIFICATION OF NATIVE ARTERY: ICD-10-CM

## 2025-05-13 DIAGNOSIS — I25.10 CORONARY ARTERY CALCIFICATION OF NATIVE ARTERY: ICD-10-CM

## 2025-05-13 DIAGNOSIS — M54.12 CERVICAL RADICULOPATHY: ICD-10-CM

## 2025-05-13 DIAGNOSIS — I25.84 CORONARY ARTERY CALCIFICATION OF NATIVE ARTERY: Primary | ICD-10-CM

## 2025-05-13 PROCEDURE — 93016 CV STRESS TEST SUPVJ ONLY: CPT | Performed by: INTERNAL MEDICINE

## 2025-05-13 PROCEDURE — A9502 TC99M TETROFOSMIN: HCPCS | Performed by: INTERNAL MEDICINE

## 2025-05-13 PROCEDURE — 2500000004 HC RX 250 GENERAL PHARMACY W/ HCPCS (ALT 636 FOR OP/ED): Performed by: INTERNAL MEDICINE

## 2025-05-13 PROCEDURE — 3430000001 HC RX 343 DIAGNOSTIC RADIOPHARMACEUTICALS: Performed by: INTERNAL MEDICINE

## 2025-05-13 PROCEDURE — 78452 HT MUSCLE IMAGE SPECT MULT: CPT

## 2025-05-13 PROCEDURE — 93018 CV STRESS TEST I&R ONLY: CPT | Performed by: INTERNAL MEDICINE

## 2025-05-13 PROCEDURE — 78452 HT MUSCLE IMAGE SPECT MULT: CPT | Performed by: INTERNAL MEDICINE

## 2025-05-13 RX ORDER — REGADENOSON 0.08 MG/ML
0.4 INJECTION, SOLUTION INTRAVENOUS ONCE
Status: COMPLETED | OUTPATIENT
Start: 2025-05-13 | End: 2025-05-13

## 2025-05-13 RX ADMIN — TETROFOSMIN 11.2 MILLICURIE: 0.23 INJECTION, POWDER, LYOPHILIZED, FOR SOLUTION INTRAVENOUS at 12:01

## 2025-05-13 RX ADMIN — TETROFOSMIN 35.9 MILLICURIE: 0.23 INJECTION, POWDER, LYOPHILIZED, FOR SOLUTION INTRAVENOUS at 13:08

## 2025-05-13 RX ADMIN — REGADENOSON 0.4 MG: 0.08 INJECTION, SOLUTION INTRAVENOUS at 13:09

## 2025-05-19 ENCOUNTER — TELEPHONE (OUTPATIENT)
Dept: CARDIOLOGY | Facility: CLINIC | Age: 75
End: 2025-05-19
Payer: MEDICARE

## 2025-05-19 NOTE — TELEPHONE ENCOUNTER
RN called and LVM asking patient to return call regarding stress test results.      Ting Beard, RN

## 2025-05-19 NOTE — TELEPHONE ENCOUNTER
----- Message from Ty Gordon sent at 5/19/2025  2:40 PM EDT -----  Please call and tell her that her stress test does show evidence of some coronary disease.  It is mild to moderate with normal heart muscle function.  As long as she has no symptoms can wait for   scheduled appointment with this degree of disease usually medical therapy alone is adequate.  If any symptoms at all please move her appointment up to see me later this week  ----- Message -----  From: Interface, Radiology Results In  Sent: 5/13/2025   5:10 PM EDT  To: Ty Gordon MD

## 2025-05-20 ENCOUNTER — TREATMENT (OUTPATIENT)
Dept: PHYSICAL THERAPY | Facility: HOSPITAL | Age: 75
End: 2025-05-20
Payer: MEDICARE

## 2025-05-20 DIAGNOSIS — M54.12 CERVICAL RADICULOPATHY: ICD-10-CM

## 2025-05-20 PROCEDURE — 97110 THERAPEUTIC EXERCISES: CPT | Mod: GP

## 2025-05-20 PROCEDURE — 97012 MECHANICAL TRACTION THERAPY: CPT | Mod: GP

## 2025-05-20 NOTE — PROGRESS NOTES
[facility]    Physical Therapy Treatment Note  Patient Name: Sarah Chavez  Primary Language: English  MRN: [id number]  Today's Date: 5/20/2025  Problem List Items Addressed This Visit           ICD-10-CM    Cervical radiculopathy M54.12      Isidra Sima ALICIA, *    Insurance:  Visit number: 5   Insurance Type: Payor: [facility] MEDICARE / Plan: [employer] MEDICARE / Product Type: *No Product type* /     Pain:  0/10    Treatment:   Cervical Traction static 12# x12min  Reviewed HEP      Assessment: Pt reports pain still occurring daily, but not all day.  Some days few times, and at times she wakes with pain.  Pt reports working on pool, pulling pool cover without any symptoms. Pt reports total relief the day of PT, but will return the following day.  Horiz ABD =4/5 B/L  Still reaching 8/10 when pain is present -10min total but then may return later.   Pt notes pain with R cervical rotation prior to cervical traction, which is reduced following cervical traction.  Pt to monitor susan for this date and tomorrow may continue with traction pending pt report.    Plan:   Continue with current treatment methods to improve functional mobility and improve ADLs.     Education:  Patient required verbal cues for proper form/technique    Goals:  Short Term Goals:  3 Visits   Pt to be independent and compliant with home exercise program to promote strength, range of motion, balance/prioprioception and improved posture.-MET     Long Term Goals:    10 Visits  Pt to have 4/5 strength of Bilateral Shoulder HorizABD to improve scapular stability and improved posture.-MET  Pt to report worst pain 2/10 x3 for 3 consecutive days to improve ADL susan- progressing  Pt to improve Neck Disability Index score from 11/50 to 6/50 to improve ADLs    Billing:  Time Calculation  Start Time: 1040  Stop Time: 1120  Time Calculation (min): 40 min     PT Therapeutic Procedures Time  Entry  Therapeutic Exercise Time Entry: 28  PT Modalities Time Entry  Mechanical Traction Time Entry: 12

## 2025-05-23 ENCOUNTER — OFFICE VISIT (OUTPATIENT)
Dept: URGENT CARE | Age: 75
End: 2025-05-23
Payer: MEDICARE

## 2025-05-23 DIAGNOSIS — Z86.711 HISTORY OF PULMONARY EMBOLUS (PE): Primary | ICD-10-CM

## 2025-05-23 NOTE — PROGRESS NOTES
Pt presents to  with concern for PE.  States she has had a blood clot in her lungs before, in 2021.  States she has pain in her ribs.  Pt advised that she needs further evaluation with more advanced imaging in the emergency department.  She denies SOB, chest pain, etc.  She was offered transport via EMS but declines.

## 2025-05-27 ENCOUNTER — APPOINTMENT (OUTPATIENT)
Dept: PHYSICAL THERAPY | Facility: HOSPITAL | Age: 75
End: 2025-05-27
Payer: MEDICARE

## 2025-05-27 DIAGNOSIS — M54.12 CERVICAL RADICULOPATHY: ICD-10-CM

## 2025-05-29 ENCOUNTER — PATIENT OUTREACH (OUTPATIENT)
Dept: PRIMARY CARE | Facility: CLINIC | Age: 75
End: 2025-05-29
Payer: MEDICARE

## 2025-05-29 NOTE — PROGRESS NOTES
Discharge Facility: [facility]  Discharge Diagnosis: Sepsis due to pneumonia   Admission Date: 5/25/25  Discharge Date: 5/2825    PCP Appointment Date: tasked to office  Specialist Appointment Date: Cardiology 6/9/25  Hospital Encounter and Summary Linked: Yes  Hospital Encounter      See discharge assessment below for further details     Wrap Up  Wrap Up Additional Comments: This CM spoke with patient via phone. Successful transition of care outreach complete. Pt reports doing well at home since discharge. Frequest cough noted during outreach. The pt is able to sleep with head slightly elevated although she still has pain. New meds reviewed. Pt denies any prescription medication questions.  Patient denies any further discharge questions/needs at this time. Emphasized that Follow up is needed after discharge to review the hospital recommendations, assess your response to your treatment. Tasked office for follow up with PCP.  Pt aware of my availability for non-emergent concerns. Contact info provided to patient. (5/29/2025 10:46 AM)    Medications  Medications reviewed with patient/caregiver?: Yes (5/29/2025 10:46 AM)  Is the patient having any side effects they believe may be caused by any medication additions or changes?: No (5/29/2025 10:46 AM)  Does the patient have all medications ordered at discharge?: Yes (5/29/2025 10:46 AM)  Care Management Interventions: No intervention needed (5/29/2025 10:46 AM)  Prescription Comments: acetaminophen 500 mg tablet Commonly known as: TYLENOL cefdinir 300 mg capsule Commonly known as: OMNICEF doxycycline hyclate 100 mg capsule Commonly known as: VIBRAMYCIN guaiFENesin 600 mg 12 hr tablet Commonly known as: MUCINEX keTORolac 10 mg tablet Commonly known as: Toradol lidocaine 4 % patch Commonly known as: SALONPAS Apply 1 patch as directed once daily. APPLY TO: CHEST - Remove patch after 12 hours. orphenadrine  mg tablet Commonly known as: NORFLEX oxyCODONE IR 5 mg  immediate release tablet Commonly known as: ROXICODONE (5/29/2025 10:46 AM)  Is the patient taking all medications as directed (includes completed medication regime)?: Yes (5/29/2025 10:46 AM)  Medication Comments: no noted issues obtaining medications (5/29/2025 10:46 AM)    Appointments  Does the patient have a primary care provider?: Yes (5/29/2025 10:46 AM)  Care Management Interventions: Educated patient on importance of making appointment; Advised patient to make appointment (5/29/2025 10:46 AM)  Has the patient kept scheduled appointments due by today?: Yes (5/29/2025 10:46 AM)    Self Management  What is the home health agency?: denies need (5/29/2025 10:46 AM)  What Durable Medical Equipment (DME) was ordered?: denies need (5/29/2025 10:46 AM)    Patient Teaching  Does the patient have access to their discharge instructions?: Yes (5/29/2025 10:46 AM)  What is the patient's perception of their health status since discharge?: Improving (5/29/2025 10:46 AM)  Is the patient/caregiver able to teach back the hierarchy of who to call/visit for symptoms/problems? PCP, Specialist, Home Health nurse, Urgent Care, ED, 911: Yes (5/29/2025 10:46 AM)

## 2025-06-03 ENCOUNTER — APPOINTMENT (OUTPATIENT)
Dept: PRIMARY CARE | Facility: CLINIC | Age: 75
End: 2025-06-03
Payer: MEDICARE

## 2025-06-03 ENCOUNTER — APPOINTMENT (OUTPATIENT)
Dept: PHYSICAL THERAPY | Facility: HOSPITAL | Age: 75
End: 2025-06-03
Payer: MEDICARE

## 2025-06-03 VITALS
OXYGEN SATURATION: 95 % | DIASTOLIC BLOOD PRESSURE: 58 MMHG | BODY MASS INDEX: 23.79 KG/M2 | WEIGHT: 126 LBS | HEIGHT: 61 IN | RESPIRATION RATE: 16 BRPM | SYSTOLIC BLOOD PRESSURE: 110 MMHG | TEMPERATURE: 98.1 F | HEART RATE: 74 BPM

## 2025-06-03 DIAGNOSIS — R73.9 HYPERGLYCEMIA: ICD-10-CM

## 2025-06-03 DIAGNOSIS — M54.12 CERVICAL RADICULOPATHY: ICD-10-CM

## 2025-06-03 DIAGNOSIS — I25.10 ATHSCL HEART DISEASE OF NATIVE CORONARY ARTERY W/O ANG PCTRS: ICD-10-CM

## 2025-06-03 DIAGNOSIS — F41.9 ANXIETY: ICD-10-CM

## 2025-06-03 DIAGNOSIS — M06.9 RHEUMATOID ARTHRITIS, INVOLVING UNSPECIFIED SITE, UNSPECIFIED WHETHER RHEUMATOID FACTOR PRESENT (MULTI): ICD-10-CM

## 2025-06-03 DIAGNOSIS — C15.5 CANCER OF ABDOMINAL ESOPHAGUS (MULTI): ICD-10-CM

## 2025-06-03 DIAGNOSIS — A41.9 SEPSIS, DUE TO UNSPECIFIED ORGANISM, UNSPECIFIED WHETHER ACUTE ORGAN DYSFUNCTION PRESENT (MULTI): ICD-10-CM

## 2025-06-03 DIAGNOSIS — J18.9 PNEUMONIA DUE TO INFECTIOUS ORGANISM, UNSPECIFIED LATERALITY, UNSPECIFIED PART OF LUNG: ICD-10-CM

## 2025-06-03 DIAGNOSIS — C77.2 SECONDARY AND UNSPECIFIED MALIGNANT NEOPLASM OF INTRA-ABDOMINAL LYMPH NODES (MULTI): ICD-10-CM

## 2025-06-03 DIAGNOSIS — F32.0 DEPRESSION, MAJOR, SINGLE EPISODE, MILD: ICD-10-CM

## 2025-06-03 DIAGNOSIS — Z79.899 MEDICATION MANAGEMENT: ICD-10-CM

## 2025-06-03 DIAGNOSIS — Z12.31 ENCOUNTER FOR SCREENING MAMMOGRAM FOR BREAST CANCER: ICD-10-CM

## 2025-06-03 DIAGNOSIS — L98.429: ICD-10-CM

## 2025-06-03 DIAGNOSIS — K50.919 CROHN'S DISEASE WITH COMPLICATION, UNSPECIFIED GASTROINTESTINAL TRACT LOCATION: ICD-10-CM

## 2025-06-03 DIAGNOSIS — C73 PAPILLARY THYROID CARCINOMA: ICD-10-CM

## 2025-06-03 PROCEDURE — 99496 TRANSJ CARE MGMT HIGH F2F 7D: CPT | Performed by: FAMILY MEDICINE

## 2025-06-03 PROCEDURE — 1036F TOBACCO NON-USER: CPT | Performed by: FAMILY MEDICINE

## 2025-06-03 PROCEDURE — 3008F BODY MASS INDEX DOCD: CPT | Performed by: FAMILY MEDICINE

## 2025-06-03 RX ORDER — ALBUTEROL SULFATE 90 UG/1
2 INHALANT RESPIRATORY (INHALATION) EVERY 6 HOURS PRN
Qty: 18 G | Refills: 11 | Status: SHIPPED | OUTPATIENT
Start: 2025-06-03 | End: 2026-06-03

## 2025-06-03 RX ORDER — OXYCODONE HYDROCHLORIDE 5 MG/1
TABLET ORAL
COMMUNITY
Start: 2025-05-28

## 2025-06-03 RX ORDER — DOXYCYCLINE 100 MG/1
100 CAPSULE ORAL 2 TIMES DAILY
Qty: 20 CAPSULE | Refills: 0 | Status: SHIPPED | OUTPATIENT
Start: 2025-06-03 | End: 2025-06-13

## 2025-06-03 RX ORDER — KETOROLAC TROMETHAMINE 10 MG/1
10 TABLET, FILM COATED ORAL EVERY 6 HOURS PRN
COMMUNITY
Start: 2025-05-28

## 2025-06-03 NOTE — PROGRESS NOTES
"Subjective   Patient ID: Sarah Chavez is a 74 y.o. female who presents for   Chief Complaint   Patient presents with    Hospital Follow-up   Discharge Facility: [facility]  Discharge Diagnosis: Sepsis due to pneumonia   Admission Date: 5/25/25  Discharge Date: 5/2825     PCP Appointment Date: tasked to office  Specialist Appointment Date: Cardiology 6/9/25  Hospital Encounter and Summary Linked: Yes   CHAPIS DAY 5      HPI  Problem List[1]    Surgical History[2]    Review of Systems  Elevated BTNP    NO history of recent Covid nor flu symptoms,    NO Fever nor chills today,    NO Chest pain, shortness of breath nor paroxysmal nocturnal dyspnea,  NO Nausea, vomiting, nor diarrhea,  NO Hematochezia nor melena,  NO Dysuria, hematuria, nor new incontinence issues  NO new severe headaches nor neurological complaints,  NO new issues with anxiety nor depression nor new psychiatric complaints,  NO suicidal nor homicidal ideations.     OBJECTIVE:  /58   Pulse 74   Temp 36.7 °C (98.1 °F) (Temporal)   Resp 16   Ht 1.549 m (5' 1\")   Wt 57.2 kg (126 lb)   LMP 01/01/1987 (Approximate)   SpO2 95%   BMI 23.81 kg/m²      General:  alert, oriented, no acute distress.  No obvious skin rashes noted.       Mood is pleasant,  no signs of emotional distress.   Not appearing intoxicated or altered.   No voiced delusions,   Normal, appropriate behavior.    HEENT: Normocephalic, atraumatic,   Pupils round, reactive to light  Extraocular motions intact and wnl  Tympanic membranes normal    Neck: no nuchal rigidity  No masses palpable.  No carotid bruits.  No thyromegaly.    Respiratory: Equal breath sounds  No wheezes,   bibasilar rales,    no rhonchi  No respiratory distress.    Heart: Regular rate and rhythm, no    murmurs  no rubs/gallops    Abdomen: no masses palpable, nontender, no rebound nor guarding.    Extremities: NO cyanosis noted, no clubbing.   No edema noted.  2+dorsalis pedis pulses.    Normal-not antalgic, " steady gait.    No visits with results within 3 Month(s) from this visit.   Latest known visit with results is:   Lab on 01/30/2025   Component Date Value Ref Range Status    WBC 01/30/2025 6.2  4.4 - 11.3 x10*3/uL Final    RBC 01/30/2025 4.05  4.00 - 5.20 x10*6/uL Final    Hemoglobin 01/30/2025 14.0  12.0 - 16.0 g/dL Final    Hematocrit 01/30/2025 41.9  36.0 - 46.0 % Final    MCV 01/30/2025 104 (H)  80 - 100 fL Final    MCH 01/30/2025 34.6 (H)  26.0 - 34.0 pg Final    MCHC 01/30/2025 33.4  32.0 - 36.0 g/dL Final    RDW 01/30/2025 13.5  11.5 - 14.5 % Final    Platelets 01/30/2025 237  150 - 450 x10*3/uL Final    Neutrophils % 01/30/2025 43.1  40.0 - 80.0 % Final    Immature Granulocytes %, Automated 01/30/2025 0.0  0.0 - 0.9 % Final    Immature Granulocyte Count (IG) includes promyelocytes, myelocytes and metamyelocytes but does not include bands. Percent differential counts (%) should be interpreted in the context of the absolute cell counts (cells/UL).    Lymphocytes % 01/30/2025 40.1  13.0 - 44.0 % Final    Monocytes % 01/30/2025 12.2  2.0 - 10.0 % Final    Eosinophils % 01/30/2025 3.5  0.0 - 6.0 % Final    Basophils % 01/30/2025 1.1  0.0 - 2.0 % Final    Neutrophils Absolute 01/30/2025 2.68  1.60 - 5.50 x10*3/uL Final    Percent differential counts (%) should be interpreted in the context of the absolute cell counts (cells/uL).    Immature Granulocytes Absolute, Au* 01/30/2025 0.00  0.00 - 0.50 x10*3/uL Final    Lymphocytes Absolute 01/30/2025 2.50  0.80 - 3.00 x10*3/uL Final    Monocytes Absolute 01/30/2025 0.76  0.05 - 0.80 x10*3/uL Final    Eosinophils Absolute 01/30/2025 0.22  0.00 - 0.40 x10*3/uL Final    Basophils Absolute 01/30/2025 0.07  0.00 - 0.10 x10*3/uL Final    Glucose 01/30/2025 99  74 - 99 mg/dL Final    Sodium 01/30/2025 139  136 - 145 mmol/L Final    Potassium 01/30/2025 3.7  3.5 - 5.3 mmol/L Final    Chloride 01/30/2025 101  98 - 107 mmol/L Final    Bicarbonate 01/30/2025 31  21 - 32 mmol/L  Final    Anion Gap 01/30/2025 11  10 - 20 mmol/L Final    Urea Nitrogen 01/30/2025 12  6 - 23 mg/dL Final    Creatinine 01/30/2025 0.65  0.50 - 1.05 mg/dL Final    eGFR 01/30/2025 >90  >60 mL/min/1.73m*2 Final    Calculations of estimated GFR are performed using the 2021 CKD-EPI Study Refit equation without the race variable for the IDMS-Traceable creatinine methods.  https://jasn.asnjournals.org/content/early/2021/09/22/ASN.3044833876    Calcium 01/30/2025 9.3  8.6 - 10.3 mg/dL Final    Albumin 01/30/2025 4.0  3.4 - 5.0 g/dL Final    Alkaline Phosphatase 01/30/2025 72  33 - 136 U/L Final    Total Protein 01/30/2025 6.3 (L)  6.4 - 8.2 g/dL Final    AST 01/30/2025 19  9 - 39 U/L Final    Bilirubin, Total 01/30/2025 0.6  0.0 - 1.2 mg/dL Final    ALT 01/30/2025 16  7 - 45 U/L Final    Patients treated with Sulfasalazine may generate falsely decreased results for ALT.    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)     Other Visit Diagnoses         Sepsis, due to unspecified organism, unspecified whether acute organ dysfunction present (Multi)          Pneumonia due to infectious organism, unspecified laterality, unspecified part of lung          Encounter for screening mammogram for breast cancer        Relevant Orders    BI mammo bilateral screening tomosynthesis      Medication management          Non-pressure chronic ulcer of back, unspecified ulcer stage (Multi)            Had stress test and follows with cardio  Sees dr malik-advised to d/w dr helena simmons  Advised to cut xanax in 1/2 due to decreased  "resp drive  Started pain meds at hospital  Another 10d doxy  And appt 2wks  2wks appt to er if sob   Abn stress briefly reviewed  So low threshold to return to er if sob or cp or worse condition    Sarah Chavez -be aware that any referrals discussed should be placed today or tests/labs ordered should result in prompt scheduling today.   If not done today-then a phone call for scheduling is expected in a timely manner(within 2 weeks).   If testing is to be done-a result should be available to patient within 2 weeks time unless otherwise specified.   You, the patient or caregiver, are responsible for making sure what was discussed is actually scheduled and completed.  If suboptimal understanding of results of tests or referral reason-a follow up appointment with me should be made.  If above does NOT occur-you are to connect with us for an explanation.    Follow up at next scheduled visit -as planned or directed today.  Sooner if new or unresolved issues of concern.    Sarah Chavez We know you have a choice for your health care, THANK YOU for choosing  and [facility].  We APPRECIATE YOU.  Sincerely,   Sima Miner MD   (dr. Callahan)      Patient: Sarah Chavez  : 1950  PCP: Sima Miner MD  MRN: [id number]  Program: Transitional Care Management  Status: Enrolled  Effective Dates: 2025 - present  Responsible Staff: Mary Harvey RN  Social Drivers to be Addressed: No information to display         Sarah Chavez is a 74 y.o. female presenting today for follow-up after being discharged from the hospital 5 days ago. The main problem requiring admission was pneumonia. The discharge summary and/or Transitional Care Management documentation was reviewed. Medication reconciliation was performed as indicated via the \"Ney as Reviewed\" timestamp.     Sarah Chavez was contacted by Transitional Care Management services two days after her discharge. This encounter " and supporting documentation was reviewed.                 .               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    Cancer of abdominal esophagus (Multi)    Candidal intertrigo    Cardiomegaly    Cervical paraspinal muscle spasm    Depression, major, single episode, mild    Dysuria    Elevated platelet count    Esophageal adenocarcinoma (Multi)    GERD (gastroesophageal reflux disease)    Globus sensation    Hematuria    Hyperglycemia    Intraepithelial neoplasm of pancreas    Mild aortic insufficiency    Mild diastolic dysfunction    Nausea and/or vomiting    Odynophagia    Papillary thyroid carcinoma    Pleural effusion    Post-splenectomy    Pulmonary embolism    Rheumatoid arthritis    Seasonal allergies    Sleep difficulties    Goiter    Thyroid nodule    Trigger point of neck    Urinary tract infection    Uses feeding tube    Overweight with body mass index (BMI) of 25 to 25.9 in adult    Crohn's disease (Multi)    Encounter for Medicare annual wellness exam    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)    Gastrostomy status (Multi)    Protein-calorie malnutrition, unspecified severity (Multi)    Esophageal dysphagia    Cervical radiculopathy   [2]   Past Surgical History:  Procedure Laterality Date    APPENDECTOMY  1981    CHOLECYSTECTOMY  1994    COLONOSCOPY  09/2019    early adenomatous glandular changes-due 2024--declines-unable to tolerate prep    CYSTOSCOPY      ESOPHAGUS SURGERY      HYSTERECTOMY  1987    [facility]    LITHOTRIPSY      PANCREAS SURGERY  2019    30% of pancreas removed    SHOULDER SURGERY Right 1977    1 pin    SKIN GRAFT Left     Left Foot    SPLENECTOMY, TOTAL      THYROIDECTOMY, PARTIAL

## 2025-06-03 NOTE — PROGRESS NOTES
Discharge Facility: [facility]  Discharge Diagnosis: Sepsis due to pneumonia   Admission Date: 5/25/25  Discharge Date: 5/2825     PCP Appointment Date: tasked to office  Specialist Appointment Date: Cardiology 6/9/25  Hospital Encounter and Summary Linked: Yes

## 2025-06-09 ENCOUNTER — APPOINTMENT (OUTPATIENT)
Dept: CARDIOLOGY | Facility: CLINIC | Age: 75
End: 2025-06-09
Payer: MEDICARE

## 2025-06-09 VITALS
BODY MASS INDEX: 22.58 KG/M2 | DIASTOLIC BLOOD PRESSURE: 76 MMHG | HEART RATE: 81 BPM | WEIGHT: 119.6 LBS | SYSTOLIC BLOOD PRESSURE: 126 MMHG | HEIGHT: 61 IN

## 2025-06-09 DIAGNOSIS — R94.39 CARDIOVASCULAR STRESS TEST ABNORMAL: ICD-10-CM

## 2025-06-09 DIAGNOSIS — I25.84 CORONARY ARTERY CALCIFICATION OF NATIVE ARTERY: ICD-10-CM

## 2025-06-09 DIAGNOSIS — E78.2 MIXED HYPERLIPIDEMIA: ICD-10-CM

## 2025-06-09 DIAGNOSIS — I25.10 CORONARY ARTERY CALCIFICATION OF NATIVE ARTERY: ICD-10-CM

## 2025-06-09 PROCEDURE — 1036F TOBACCO NON-USER: CPT | Performed by: INTERNAL MEDICINE

## 2025-06-09 PROCEDURE — 1159F MED LIST DOCD IN RCRD: CPT | Performed by: INTERNAL MEDICINE

## 2025-06-09 PROCEDURE — 99215 OFFICE O/P EST HI 40 MIN: CPT | Performed by: INTERNAL MEDICINE

## 2025-06-09 PROCEDURE — G2211 COMPLEX E/M VISIT ADD ON: HCPCS | Performed by: INTERNAL MEDICINE

## 2025-06-09 PROCEDURE — 3008F BODY MASS INDEX DOCD: CPT | Performed by: INTERNAL MEDICINE

## 2025-06-09 RX ORDER — PITAVASTATIN MAGNESIUM 2 MG/1
1 TABLET, FILM COATED ORAL DAILY
Qty: 90 TABLET | Refills: 1 | Status: SHIPPED | OUTPATIENT
Start: 2025-06-09

## 2025-06-09 NOTE — H&P (VIEW-ONLY)
Patient:  Sarah Chavez  YOB: 1950  MRN: [id number]       Impression/Plan:     Diagnoses and all orders for this visit:  Cardiovascular stress test abnormal  Coronary artery calcification of native artery  -    She has a significant amount of lateral ischemia which could contribute to some of her exertional dyspnea.  Her pleuritic chest pain on the right not likely related to coronary disease.  Probably indeed did have a pneumonia and a component of pleuritis probably viral.  -     Would add aspirin to her medical regimen.  Also will try to get generic pitavastatin (zympistatin)  -     Randy risk and benefits including that myocardial infarction, stroke, arterial injury, contrast reaction, renal failure she understands the risk and agrees to proceed.  She is already tolerated contrast in the past without adverse effect.  She has recently had renal function shown to be normal.  -     Discussed with her the possibility that no significant disease would be found, moderate disease that would require only medication adjustments, focal obstructive disease amenable to stenting and, much less likely multivessel coronary disease that could require bypass surgery.  She understands these concepts and agrees to proceed  -     Case Request Cath Lab: [facility], With LV, PCI; Standing  -     Follow Up In Cardiology; Future  Mixed hyperlipidemia  -     Hopefully the generic will be affordable.  Would then need to consider PCSK9 inhibitors.  -     pitavastatin magnesium (Zypitamag) 2 mg tablet; Take 1 mg by mouth once daily.      Chief Complaint/Active Symptoms:      Chief Complaint   Patient presents with    Results     Here to discuss recent testing.        Sarah Chavez is a 74 y.o. female who presents with hyperlipidemia coronary artery disease based on coronary calcium.  She has a history of esophageal cancer, adenocarcinoma with treatment completed August 2023.      I had last seen her 4/7/25 as a new  patient for hyperlipidemia statin intolerant therefore Pitavastatin trialed.  Noted to have LDL at 151 with Cy calcium noted on CT scanning and therefore stress perfusion study was noted.  Also was seen to have sinus bradycardia in the office and stress test to assess chronotropic competence.       5/13/2025 test/Lexiscan perfusion  Abnormal Lexiscan Myoview cardiac perfusion stress test.  Moderate lateral wall myocardial ischemia by perfusion imaging.  No evidence of myocardial infarction by perfusion imaging.  Normal left ventricular systolic function.  Left ventricular ejection fraction 64 %.  Normal LV systolic function with lateral wall ischemia suggesting  proximal circumflex disease. Overall systolic function is normal this  would be considered moderate risk study. Very low functional capacity  for age at just 2.3 M ET though no chest pain or dyspnea stopped  secondary to fatigue.      Admitted to [facility] 5/25/2025 through 5/28/2025.  She had presented with right sided chest discomfort.  She had shortness of breath as well.  Possibly have a component of pneumonia treated with antibiotics also pain management.  Toi is borderline 17 upper normal 12.  BNP significantly elevated at 1400.  5/28/2025 BMP normal, CBC with elevated white count 11.3 normal hemoglobin.  Presenting hemoglobin had been 15 suggestive of infection.  Procalcitonin however had been negative.      5/23/2025 CT chest: No evidence of pulmonary embolism atelectasis left lower lobe right middle lobe.    Since discharge from the clinic she still has some pleuritic chest discomfort though as noted above CT did not show any evidence of pulmonary embolism.  She was felt to have pneumonia and did have an elevated white count and does feel better at this time she never had a cough or fever however.  She does notice if she exerts herself she gets some shortness of breath perhaps some increase in chest pain but the pleuritic  nature on the right is clearly noncardiac.  She is not sure if she may have some midsternal pain to but for the most part she only notes being slightly short of breath with exertion.    She did try to get pitavastatin but was far too expensive.    Review of Systems: Unremarkable except as noted above    Meds     Current Outpatient Medications   Medication Instructions    albuterol 90 mcg/actuation inhaler 2 puffs, inhalation, Every 6 hours PRN    ALPRAZolam (XANAX) 0.25 mg, oral, 3 times daily PRN    apple cider vinegar 300 mg tablet TAKE AS DIRECTED.    ascorbic acid (Vitamin C) 1,000 mg tablet 1 tablet, 2 times daily    b complex vitamins capsule 1 capsule, Daily    BACILLUS COAGULANS-INULIN ORAL 1 tablet, Daily    bismuth subsalicylate (Pepto Bismol) 262 mg/15 mL suspension 15 mL, Every 6 hours PRN    cholecalciferol (VITAMIN D-3) 125 mcg, Daily    doxycycline (VIBRAMYCIN) 100 mg, oral, 2 times daily, Take with at least 8 ounces (large glass) of water, do not lie down for 30 minutes after    echinacea 400 mg capsule 1 capsule, Daily    ferrous sulfate 325 (65 Fe) MG tablet 65 mg of elemental iron, Daily with breakfast    fish oil concentrate (Omega-3) 120-180 mg capsule 1 g, Daily    gabapentin (NEURONTIN) 100 mg, oral, Nightly, Can double dose after 3-4 day if no side effects    ketoconazole (NIZOral) 2 % cream APPLY SPARINGLY TO AFFECTED AREA(S) ONCE DAILY    ketorolac (TORADOL) 10 mg, Every 6 hours PRN    magnesium 250 mg tablet 1 tablet, Daily    melatonin 5 mg capsule 1 capsule, Nightly    omeprazole (PRILOSEC) 40 mg, oral, Daily    oxyCODONE (Roxicodone) 5 mg immediate release tablet TAKE ONE TABLET BY MOUTH EVERY 6 HOURS AS NEEDED FOR UP TO 5 DAYS    pitavastatin calcium 1 mg, oral, Daily    potassium gluconate 595 mg (99 mg) tablet extended release 1 tablet, Daily    sodium-potassium bicarbonate (Dahlia-Ephrata Gold) 344-1,050-1,000 mg dispersible tablet 1 tablet, Daily PRN    traMADol (ULTRAM) 50 mg, 4  times daily PRN    turmeric root extract 500 mg tablet 1 tablet, Daily        Allergies   Allergies[1]      Annotated Problems     Specialty Problems          Cardiology Problems    Athscl heart disease of native coronary artery w/o ang pctrs    Cardiomegaly    Mild aortic insufficiency    Mild diastolic dysfunction    Pulmonary embolism        Problem List     Patient Active Problem List    Diagnosis Date Noted    Cervical radiculopathy 03/28/2025    Esophageal dysphagia 07/12/2024    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    Gastrostomy status (Multi) 06/28/2023    Protein-calorie malnutrition, unspecified severity (Multi) 06/28/2023    Crohn's disease (Multi) 03/20/2023    Encounter for Medicare annual wellness exam 03/20/2023    Actinic keratosis 02/04/2023    Anxiety 02/04/2023    Athscl heart disease of native coronary artery w/o ang pctrs 02/04/2023    Cancer of abdominal esophagus (Multi) 02/04/2023    Candidal intertrigo 02/04/2023    Cardiomegaly 02/04/2023    Cervical paraspinal muscle spasm 02/04/2023    Depression, major, single episode, mild 02/04/2023    Dysuria 02/04/2023    Elevated platelet count 02/04/2023    Esophageal adenocarcinoma (Multi) 02/04/2023    GERD (gastroesophageal reflux disease) 02/04/2023    Globus sensation 02/04/2023    Hematuria 02/04/2023    Hyperglycemia 02/04/2023    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    Mild aortic insufficiency 02/04/2023    Mild diastolic dysfunction 02/04/2023    Nausea and/or vomiting 02/04/2023    Odynophagia 02/04/2023    Papillary thyroid carcinoma 02/04/2023    Pleural effusion 02/04/2023    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    Pulmonary embolism 02/04/2023    Rheumatoid arthritis 02/04/2023    Seasonal allergies 02/04/2023    Sleep difficulties 02/04/2023    Goiter 02/04/2023    Thyroid nodule 02/04/2023    Trigger point of neck 02/04/2023    Urinary tract infection 02/04/2023    Uses  "feeding tube 02/04/2023    Overweight with body mass index (BMI) of 25 to 25.9 in adult 02/04/2023       Objective:     Vitals:    06/09/25 0902   BP: 126/76   BP Location: Left arm   Patient Position: Sitting   Pulse: 81   Weight: 54.3 kg (119 lb 9.6 oz)   Height: 1.549 m (5' 1\")      Wt Readings from Last 4 Encounters:   06/09/25 54.3 kg (119 lb 9.6 oz)   06/03/25 57.2 kg (126 lb)   04/07/25 57.9 kg (127 lb 9.6 oz)   03/25/25 57.2 kg (126 lb)           LAB:     Lab Results   Component Value Date    WBC 6.2 01/30/2025    HGB 14.0 01/30/2025    HCT 41.9 01/30/2025     01/30/2025    CHOL 231 (H) 06/13/2024    TRIG 98 06/13/2024    HDL 60.2 06/13/2024    ALT 16 01/30/2025    AST 19 01/30/2025     01/30/2025    K 3.7 01/30/2025     01/30/2025    CREATININE 0.65 01/30/2025    BUN 12 01/30/2025    CO2 31 01/30/2025    TSH 3.15 03/13/2024    INR 0.9 04/15/2024    HGBA1C 5.3 06/13/2024         Physical Exam     General Appearance: alert and oriented to person, place and time, in no acute distress  Cardiovascular: normal rate, regular rhythm, normal S1 and S2, no murmurs, rubs, clicks, or gallops,  no JVD  Pulmonary/Chest: clear to auscultation bilaterally- no wheezes, rales or rhonchi, normal air movement, no respiratory distress  Abdomen: soft, non-tender, non-distended, normal bowel sounds, no masses   Extremities: no cyanosis, clubbing or edema  Skin: warm and dry, no rash or erythema  Eyes: EOMI  Neck: supple and non-tender without mass, no thyromegaly   Neurological: alert, oriented, normal speech, no focal findings or movement disorder noted  Vascular: Pulses 2+    Scribe Attestation  By signing my name below, I, Ting Beard RN, Scribe   attest that this documentation has been prepared under the direction and in the presence of Ty Gordon MD.      Provider attestation-scribe documentation  Any medical record entries made by the scribe were at my discretion and personally dictated by me.  I " have reviewed the chart and agree that the record accurately reflects my personal performance of the history, physical exam, discussion and plan.                 [1]   Allergies  Allergen Reactions    Atorvastatin Other     LEG CRAMPS    Rosuvastatin Other     LEG CRAMPS    Statins-Hmg-Coa Reductase Inhibitors Myalgia     Specifically crestor

## 2025-06-09 NOTE — PROGRESS NOTES
Patient:  Sarah Chavez  YOB: 1950  MRN: [id number]       Impression/Plan:     Diagnoses and all orders for this visit:  Cardiovascular stress test abnormal  Coronary artery calcification of native artery  -    She has a significant amount of lateral ischemia which could contribute to some of her exertional dyspnea.  Her pleuritic chest pain on the right not likely related to coronary disease.  Probably indeed did have a pneumonia and a component of pleuritis probably viral.  -     Would add aspirin to her medical regimen.  Also will try to get generic pitavastatin (zympistatin)  -     Randy risk and benefits including that myocardial infarction, stroke, arterial injury, contrast reaction, renal failure she understands the risk and agrees to proceed.  She is already tolerated contrast in the past without adverse effect.  She has recently had renal function shown to be normal.  -     Discussed with her the possibility that no significant disease would be found, moderate disease that would require only medication adjustments, focal obstructive disease amenable to stenting and, much less likely multivessel coronary disease that could require bypass surgery.  She understands these concepts and agrees to proceed  -     Case Request Cath Lab: [facility], With LV, PCI; Standing  -     Follow Up In Cardiology; Future  Mixed hyperlipidemia  -     Hopefully the generic will be affordable.  Would then need to consider PCSK9 inhibitors.  -     pitavastatin magnesium (Zypitamag) 2 mg tablet; Take 1 mg by mouth once daily.      Chief Complaint/Active Symptoms:      Chief Complaint   Patient presents with    Results     Here to discuss recent testing.        Sarah Chavez is a 74 y.o. female who presents with hyperlipidemia coronary artery disease based on coronary calcium.  She has a history of esophageal cancer, adenocarcinoma with treatment completed August 2023.      I had last seen her 4/7/25 as a new  patient for hyperlipidemia statin intolerant therefore Pitavastatin trialed.  Noted to have LDL at 151 with Cy calcium noted on CT scanning and therefore stress perfusion study was noted.  Also was seen to have sinus bradycardia in the office and stress test to assess chronotropic competence.       5/13/2025 test/Lexiscan perfusion  Abnormal Lexiscan Myoview cardiac perfusion stress test.  Moderate lateral wall myocardial ischemia by perfusion imaging.  No evidence of myocardial infarction by perfusion imaging.  Normal left ventricular systolic function.  Left ventricular ejection fraction 64 %.  Normal LV systolic function with lateral wall ischemia suggesting  proximal circumflex disease. Overall systolic function is normal this  would be considered moderate risk study. Very low functional capacity  for age at just 2.3 M ET though no chest pain or dyspnea stopped  secondary to fatigue.      Admitted to [facility] 5/25/2025 through 5/28/2025.  She had presented with right sided chest discomfort.  She had shortness of breath as well.  Possibly have a component of pneumonia treated with antibiotics also pain management.  Toi is borderline 17 upper normal 12.  BNP significantly elevated at 1400.  5/28/2025 BMP normal, CBC with elevated white count 11.3 normal hemoglobin.  Presenting hemoglobin had been 15 suggestive of infection.  Procalcitonin however had been negative.      5/23/2025 CT chest: No evidence of pulmonary embolism atelectasis left lower lobe right middle lobe.    Since discharge from the clinic she still has some pleuritic chest discomfort though as noted above CT did not show any evidence of pulmonary embolism.  She was felt to have pneumonia and did have an elevated white count and does feel better at this time she never had a cough or fever however.  She does notice if she exerts herself she gets some shortness of breath perhaps some increase in chest pain but the pleuritic  nature on the right is clearly noncardiac.  She is not sure if she may have some midsternal pain to but for the most part she only notes being slightly short of breath with exertion.    She did try to get pitavastatin but was far too expensive.    Review of Systems: Unremarkable except as noted above    Meds     Current Outpatient Medications   Medication Instructions    albuterol 90 mcg/actuation inhaler 2 puffs, inhalation, Every 6 hours PRN    ALPRAZolam (XANAX) 0.25 mg, oral, 3 times daily PRN    apple cider vinegar 300 mg tablet TAKE AS DIRECTED.    ascorbic acid (Vitamin C) 1,000 mg tablet 1 tablet, 2 times daily    b complex vitamins capsule 1 capsule, Daily    BACILLUS COAGULANS-INULIN ORAL 1 tablet, Daily    bismuth subsalicylate (Pepto Bismol) 262 mg/15 mL suspension 15 mL, Every 6 hours PRN    cholecalciferol (VITAMIN D-3) 125 mcg, Daily    doxycycline (VIBRAMYCIN) 100 mg, oral, 2 times daily, Take with at least 8 ounces (large glass) of water, do not lie down for 30 minutes after    echinacea 400 mg capsule 1 capsule, Daily    ferrous sulfate 325 (65 Fe) MG tablet 65 mg of elemental iron, Daily with breakfast    fish oil concentrate (Omega-3) 120-180 mg capsule 1 g, Daily    gabapentin (NEURONTIN) 100 mg, oral, Nightly, Can double dose after 3-4 day if no side effects    ketoconazole (NIZOral) 2 % cream APPLY SPARINGLY TO AFFECTED AREA(S) ONCE DAILY    ketorolac (TORADOL) 10 mg, Every 6 hours PRN    magnesium 250 mg tablet 1 tablet, Daily    melatonin 5 mg capsule 1 capsule, Nightly    omeprazole (PRILOSEC) 40 mg, oral, Daily    oxyCODONE (Roxicodone) 5 mg immediate release tablet TAKE ONE TABLET BY MOUTH EVERY 6 HOURS AS NEEDED FOR UP TO 5 DAYS    pitavastatin calcium 1 mg, oral, Daily    potassium gluconate 595 mg (99 mg) tablet extended release 1 tablet, Daily    sodium-potassium bicarbonate (Dahlia[place] Gold) 344-1,050-1,000 mg dispersible tablet 1 tablet, Daily PRN    traMADol (ULTRAM) 50 mg, 4  times daily PRN    turmeric root extract 500 mg tablet 1 tablet, Daily        Allergies   Allergies[1]      Annotated Problems     Specialty Problems          Cardiology Problems    Athscl heart disease of native coronary artery w/o ang pctrs    Cardiomegaly    Mild aortic insufficiency    Mild diastolic dysfunction    Pulmonary embolism        Problem List     Patient Active Problem List    Diagnosis Date Noted    Cervical radiculopathy 03/28/2025    Esophageal dysphagia 07/12/2024    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    Gastrostomy status (Multi) 06/28/2023    Protein-calorie malnutrition, unspecified severity (Multi) 06/28/2023    Crohn's disease (Multi) 03/20/2023    Encounter for Medicare annual wellness exam 03/20/2023    Actinic keratosis 02/04/2023    Anxiety 02/04/2023    Athscl heart disease of native coronary artery w/o ang pctrs 02/04/2023    Cancer of abdominal esophagus (Multi) 02/04/2023    Candidal intertrigo 02/04/2023    Cardiomegaly 02/04/2023    Cervical paraspinal muscle spasm 02/04/2023    Depression, major, single episode, mild 02/04/2023    Dysuria 02/04/2023    Elevated platelet count 02/04/2023    Esophageal adenocarcinoma (Multi) 02/04/2023    GERD (gastroesophageal reflux disease) 02/04/2023    Globus sensation 02/04/2023    Hematuria 02/04/2023    Hyperglycemia 02/04/2023    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    Mild aortic insufficiency 02/04/2023    Mild diastolic dysfunction 02/04/2023    Nausea and/or vomiting 02/04/2023    Odynophagia 02/04/2023    Papillary thyroid carcinoma 02/04/2023    Pleural effusion 02/04/2023    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    Pulmonary embolism 02/04/2023    Rheumatoid arthritis 02/04/2023    Seasonal allergies 02/04/2023    Sleep difficulties 02/04/2023    Goiter 02/04/2023    Thyroid nodule 02/04/2023    Trigger point of neck 02/04/2023    Urinary tract infection 02/04/2023    Uses  "feeding tube 02/04/2023    Overweight with body mass index (BMI) of 25 to 25.9 in adult 02/04/2023       Objective:     Vitals:    06/09/25 0902   BP: 126/76   BP Location: Left arm   Patient Position: Sitting   Pulse: 81   Weight: 54.3 kg (119 lb 9.6 oz)   Height: 1.549 m (5' 1\")      Wt Readings from Last 4 Encounters:   06/09/25 54.3 kg (119 lb 9.6 oz)   06/03/25 57.2 kg (126 lb)   04/07/25 57.9 kg (127 lb 9.6 oz)   03/25/25 57.2 kg (126 lb)           LAB:     Lab Results   Component Value Date    WBC 6.2 01/30/2025    HGB 14.0 01/30/2025    HCT 41.9 01/30/2025     01/30/2025    CHOL 231 (H) 06/13/2024    TRIG 98 06/13/2024    HDL 60.2 06/13/2024    ALT 16 01/30/2025    AST 19 01/30/2025     01/30/2025    K 3.7 01/30/2025     01/30/2025    CREATININE 0.65 01/30/2025    BUN 12 01/30/2025    CO2 31 01/30/2025    TSH 3.15 03/13/2024    INR 0.9 04/15/2024    HGBA1C 5.3 06/13/2024         Physical Exam     General Appearance: alert and oriented to person, place and time, in no acute distress  Cardiovascular: normal rate, regular rhythm, normal S1 and S2, no murmurs, rubs, clicks, or gallops,  no JVD  Pulmonary/Chest: clear to auscultation bilaterally- no wheezes, rales or rhonchi, normal air movement, no respiratory distress  Abdomen: soft, non-tender, non-distended, normal bowel sounds, no masses   Extremities: no cyanosis, clubbing or edema  Skin: warm and dry, no rash or erythema  Eyes: EOMI  Neck: supple and non-tender without mass, no thyromegaly   Neurological: alert, oriented, normal speech, no focal findings or movement disorder noted  Vascular: Pulses 2+    Scribe Attestation  By signing my name below, I, Ting Beard RN, Scribe   attest that this documentation has been prepared under the direction and in the presence of Ty Gordon MD.      Provider attestation-scribe documentation  Any medical record entries made by the scribe were at my discretion and personally dictated by me.  I " have reviewed the chart and agree that the record accurately reflects my personal performance of the history, physical exam, discussion and plan.                 [1]   Allergies  Allergen Reactions    Atorvastatin Other     LEG CRAMPS    Rosuvastatin Other     LEG CRAMPS    Statins-Hmg-Coa Reductase Inhibitors Myalgia     Specifically crestor

## 2025-06-09 NOTE — PATIENT INSTRUCTIONS
FOLLOW UP WITH Dr. Ty Gordon MD, [facility] IN 1 MONTH    HEART CATHERIZATION TO BE SCHEDULED IN THE NEAR FUTURE     START TAKING ASPIRIN 81MG TAKE ONE TABLET DAILY     START TAKING ZIYPITAMAG 2MG TAKE 1/2 TABLET DAILY (1MG PER DR. GORDON)    DID YOU KNOW  We have a pharmacy here in the [facility].  They can fill all prescriptions, not just cardiac medications.  Prescriptions from other pharmacies can easily be transferred to the  pharmacy by the  pharmacist on site.   pharmacies offer FREE HOME DELIVERY on medications to anywhere in [place]. They can sync your medications. Typically prescriptions can be ready in 10 - 15 minutes. If pharmacy is unable to fill your  prescription or if cost is more than your paying now the Pharmacist can easily transfer back to your Pharmacy of choice. Pharmacy phone # [phone].     Please bring all medicines, vitamins, and herbal supplements with you in original bottles to every appointment! This is the best way to ensure your medication list in your chart is accurate.    Prescriptions will not be filled unless you are compliant with your follow up appointments or have a follow up appointment scheduled as per instruction of your physician. Refills should be requested at the time of your visit.

## 2025-06-11 ENCOUNTER — TELEPHONE (OUTPATIENT)
Dept: CARDIOLOGY | Facility: CLINIC | Age: 75
End: 2025-06-11
Payer: MEDICARE

## 2025-06-11 ENCOUNTER — PATIENT OUTREACH (OUTPATIENT)
Dept: PRIMARY CARE | Facility: CLINIC | Age: 75
End: 2025-06-11
Payer: MEDICARE

## 2025-06-11 NOTE — TELEPHONE ENCOUNTER
Pt calls office states that she was given rx for pitavastatin  pt states that insurance is not covering and would like for PA to be initiated. Placed note in PA folder. Beatriz BLAIR

## 2025-06-16 ENCOUNTER — APPOINTMENT (OUTPATIENT)
Dept: CARDIOLOGY | Facility: HOSPITAL | Age: 75
End: 2025-06-16
Payer: MEDICARE

## 2025-06-16 ENCOUNTER — HOSPITAL ENCOUNTER (OUTPATIENT)
Facility: HOSPITAL | Age: 75
Setting detail: OUTPATIENT SURGERY
Discharge: HOME | End: 2025-06-16
Attending: INTERNAL MEDICINE | Admitting: INTERNAL MEDICINE
Payer: MEDICARE

## 2025-06-16 DIAGNOSIS — I25.10 CORONARY ARTERY CALCIFICATION OF NATIVE ARTERY: Primary | ICD-10-CM

## 2025-06-16 DIAGNOSIS — I20.9 ANGINA PECTORIS, UNSPECIFIED: ICD-10-CM

## 2025-06-16 DIAGNOSIS — I25.84 CORONARY ARTERY CALCIFICATION OF NATIVE ARTERY: Primary | ICD-10-CM

## 2025-06-16 LAB
ANION GAP SERPL CALC-SCNC: 12 MMOL/L (ref 10–20)
ATRIAL RATE: 65 BPM
BUN SERPL-MCNC: 8 MG/DL (ref 6–23)
CALCIUM SERPL-MCNC: 9 MG/DL (ref 8.6–10.3)
CHLORIDE SERPL-SCNC: 108 MMOL/L (ref 98–107)
CO2 SERPL-SCNC: 27 MMOL/L (ref 21–32)
CREAT SERPL-MCNC: 0.6 MG/DL (ref 0.5–1.05)
EGFRCR SERPLBLD CKD-EPI 2021: >90 ML/MIN/1.73M*2
ERYTHROCYTE [DISTWIDTH] IN BLOOD BY AUTOMATED COUNT: 13.9 % (ref 11.5–14.5)
GLUCOSE SERPL-MCNC: 91 MG/DL (ref 74–99)
HCT VFR BLD AUTO: 38.9 % (ref 36–46)
HGB BLD-MCNC: 13 G/DL (ref 12–16)
MCH RBC QN AUTO: 33.5 PG (ref 26–34)
MCHC RBC AUTO-ENTMCNC: 33.4 G/DL (ref 32–36)
MCV RBC AUTO: 100 FL (ref 80–100)
NRBC BLD-RTO: 0 /100 WBCS (ref 0–0)
P AXIS: -49 DEGREES
P OFFSET: 196 MS
P ONSET: 146 MS
PLATELET # BLD AUTO: 478 X10*3/UL (ref 150–450)
POTASSIUM SERPL-SCNC: 4.1 MMOL/L (ref 3.5–5.3)
PR INTERVAL: 128 MS
Q ONSET: 210 MS
QRS COUNT: 10 BEATS
QRS DURATION: 102 MS
QT INTERVAL: 436 MS
QTC CALCULATION(BAZETT): 453 MS
QTC FREDERICIA: 447 MS
R AXIS: -32 DEGREES
RBC # BLD AUTO: 3.88 X10*6/UL (ref 4–5.2)
SODIUM SERPL-SCNC: 143 MMOL/L (ref 136–145)
T AXIS: -12 DEGREES
T OFFSET: 428 MS
VENTRICULAR RATE: 65 BPM
WBC # BLD AUTO: 5.9 X10*3/UL (ref 4.4–11.3)

## 2025-06-16 PROCEDURE — 85027 COMPLETE CBC AUTOMATED: CPT | Performed by: NURSE PRACTITIONER

## 2025-06-16 PROCEDURE — 99152 MOD SED SAME PHYS/QHP 5/>YRS: CPT | Performed by: INTERNAL MEDICINE

## 2025-06-16 PROCEDURE — 99024 POSTOP FOLLOW-UP VISIT: CPT | Performed by: NURSE PRACTITIONER

## 2025-06-16 PROCEDURE — 80048 BASIC METABOLIC PNL TOTAL CA: CPT | Performed by: NURSE PRACTITIONER

## 2025-06-16 PROCEDURE — 2500000005 HC RX 250 GENERAL PHARMACY W/O HCPCS: Performed by: INTERNAL MEDICINE

## 2025-06-16 PROCEDURE — C1894 INTRO/SHEATH, NON-LASER: HCPCS | Performed by: INTERNAL MEDICINE

## 2025-06-16 PROCEDURE — 7100000002 HC RECOVERY ROOM TIME - EACH INCREMENTAL 1 MINUTE: Performed by: INTERNAL MEDICINE

## 2025-06-16 PROCEDURE — 93458 L HRT ARTERY/VENTRICLE ANGIO: CPT | Performed by: INTERNAL MEDICINE

## 2025-06-16 PROCEDURE — 7100000009 HC PHASE TWO TIME - INITIAL BASE CHARGE: Performed by: INTERNAL MEDICINE

## 2025-06-16 PROCEDURE — 2500000004 HC RX 250 GENERAL PHARMACY W/ HCPCS (ALT 636 FOR OP/ED): Performed by: INTERNAL MEDICINE

## 2025-06-16 PROCEDURE — 2500000001 HC RX 250 WO HCPCS SELF ADMINISTERED DRUGS (ALT 637 FOR MEDICARE OP): Performed by: NURSE PRACTITIONER

## 2025-06-16 PROCEDURE — 99153 MOD SED SAME PHYS/QHP EA: CPT | Performed by: INTERNAL MEDICINE

## 2025-06-16 PROCEDURE — C1887 CATHETER, GUIDING: HCPCS | Performed by: INTERNAL MEDICINE

## 2025-06-16 PROCEDURE — 2550000001 HC RX 255 CONTRASTS: Mod: JW | Performed by: INTERNAL MEDICINE

## 2025-06-16 PROCEDURE — 93005 ELECTROCARDIOGRAM TRACING: CPT

## 2025-06-16 PROCEDURE — 96373 THER/PROPH/DIAG INJ IA: CPT | Mod: 59 | Performed by: INTERNAL MEDICINE

## 2025-06-16 PROCEDURE — 93010 ELECTROCARDIOGRAM REPORT: CPT | Performed by: INTERNAL MEDICINE

## 2025-06-16 PROCEDURE — 7100000010 HC PHASE TWO TIME - EACH INCREMENTAL 1 MINUTE: Performed by: INTERNAL MEDICINE

## 2025-06-16 PROCEDURE — C1760 CLOSURE DEV, VASC: HCPCS | Performed by: INTERNAL MEDICINE

## 2025-06-16 PROCEDURE — 7100000001 HC RECOVERY ROOM TIME - INITIAL BASE CHARGE: Performed by: INTERNAL MEDICINE

## 2025-06-16 PROCEDURE — 2720000007 HC OR 272 NO HCPCS: Performed by: INTERNAL MEDICINE

## 2025-06-16 PROCEDURE — 36415 COLL VENOUS BLD VENIPUNCTURE: CPT | Performed by: NURSE PRACTITIONER

## 2025-06-16 RX ORDER — HEPARIN SODIUM 1000 [USP'U]/ML
INJECTION, SOLUTION INTRAVENOUS; SUBCUTANEOUS AS NEEDED
Status: DISCONTINUED | OUTPATIENT
Start: 2025-06-16 | End: 2025-06-16 | Stop reason: HOSPADM

## 2025-06-16 RX ORDER — ASPIRIN 81 MG/1
81 TABLET ORAL DAILY
COMMUNITY

## 2025-06-16 RX ORDER — FENTANYL CITRATE 50 UG/ML
INJECTION, SOLUTION INTRAMUSCULAR; INTRAVENOUS AS NEEDED
Status: DISCONTINUED | OUTPATIENT
Start: 2025-06-16 | End: 2025-06-16 | Stop reason: HOSPADM

## 2025-06-16 RX ORDER — METOPROLOL TARTRATE 25 MG/1
12.5 TABLET, FILM COATED ORAL ONCE
Status: COMPLETED | OUTPATIENT
Start: 2025-06-16 | End: 2025-06-16

## 2025-06-16 RX ORDER — ASPIRIN 325 MG
325 TABLET ORAL ONCE
Status: COMPLETED | OUTPATIENT
Start: 2025-06-16 | End: 2025-06-16

## 2025-06-16 RX ORDER — LIDOCAINE HYDROCHLORIDE 20 MG/ML
INJECTION, SOLUTION INFILTRATION; PERINEURAL AS NEEDED
Status: DISCONTINUED | OUTPATIENT
Start: 2025-06-16 | End: 2025-06-16 | Stop reason: HOSPADM

## 2025-06-16 RX ORDER — MIDAZOLAM HYDROCHLORIDE 1 MG/ML
INJECTION, SOLUTION INTRAMUSCULAR; INTRAVENOUS AS NEEDED
Status: DISCONTINUED | OUTPATIENT
Start: 2025-06-16 | End: 2025-06-16 | Stop reason: HOSPADM

## 2025-06-16 RX ORDER — NITROGLYCERIN 40 MG/100ML
INJECTION INTRAVENOUS AS NEEDED
Status: DISCONTINUED | OUTPATIENT
Start: 2025-06-16 | End: 2025-06-16 | Stop reason: HOSPADM

## 2025-06-16 RX ORDER — RANOLAZINE 500 MG/1
500 TABLET, EXTENDED RELEASE ORAL ONCE
Status: COMPLETED | OUTPATIENT
Start: 2025-06-16 | End: 2025-06-16

## 2025-06-16 RX ADMIN — ASPIRIN 325 MG: 325 TABLET ORAL at 10:26

## 2025-06-16 RX ADMIN — RANOLAZINE 500 MG: 500 TABLET, FILM COATED, EXTENDED RELEASE ORAL at 10:26

## 2025-06-16 RX ADMIN — METOPROLOL TARTRATE 12.5 MG: 25 TABLET, FILM COATED ORAL at 10:26

## 2025-06-16 ASSESSMENT — PAIN - FUNCTIONAL ASSESSMENT
PAIN_FUNCTIONAL_ASSESSMENT: 0-10

## 2025-06-16 ASSESSMENT — PAIN SCALES - GENERAL

## 2025-06-16 ASSESSMENT — COLUMBIA-SUICIDE SEVERITY RATING SCALE - C-SSRS
1. IN THE PAST MONTH, HAVE YOU WISHED YOU WERE DEAD OR WISHED YOU COULD GO TO SLEEP AND NOT WAKE UP?: NO
6. HAVE YOU EVER DONE ANYTHING, STARTED TO DO ANYTHING, OR PREPARED TO DO ANYTHING TO END YOUR LIFE?: NO
2. HAVE YOU ACTUALLY HAD ANY THOUGHTS OF KILLING YOURSELF?: NO

## 2025-06-16 NOTE — NURSING NOTE
Patient ambulating in the halls without difficulty.  No complaints, right radial remains stable.  Discharge instructions with patient and daughter reviewed including how to hold pressure if bleeding occurs.  When to call the doctors office verse coming back to the hospital.  Resuming home medications as before with increasing fluids over the  next 24 hours.  Follow up appt discussed and patient discharged to home with volunter and wheelchair to daughter in the car.

## 2025-06-16 NOTE — NURSING NOTE
Patient recovered from the cath lab with right radial site stable TR band in place.  Good color and warmth to extremity.

## 2025-06-16 NOTE — POST-PROCEDURE NOTE
Physician Transition of Care Summary  Invasive Cardiovascular Lab    Procedure Date: 6/16/2025  Attending:    * Tabatha Garsia - Primary  Resident/Fellow/Other Assistant: Surgeons and Role:  * No surgeons found with a matching role *    Indications:   Pre-op Diagnosis      * Coronary artery calcification of native artery [I25.10, I25.84]    Post-procedure diagnosis:   Post-op Diagnosis     * Coronary artery calcification of native artery [I25.10, I25.84]    Procedure(s):   [facility], With LV  [address] - NH CATH PLMT L HRT & ARTS W/NJX & ANGIO IMG S&I        Procedure Findings:   Normal coronaries, normal left ventricular function    Description of the Procedure:   Left heart catheterization coronary angiography left angiogram    Complications:   None    Stents/Implants:   Implants       No implant documentation for this case.            Anticoagulation/Antiplatelet Plan:   Intravenous heparin    Estimated Blood Loss:   * No values recorded between 6/16/2025 11:05 AM and 6/16/2025 11:50 AM *    Anesthesia: Moderate Sedation Anesthesia Staff: No anesthesia staff entered.    Any Specimen(s) Removed:   Order Name Source Comment Collection Info Order Time   BASIC METABOLIC PANEL Blood, Venous  Collected By: Sis William RN 6/16/2025  9:46 AM     Release result to [employer]   Immediate        CBC Blood, Venous  Collected By: Sis William RN 6/16/2025  9:46 AM     Release result to MyChart   Immediate            Disposition:   Medical therapy      Electronically signed by: Tabatha Garsia MD, 6/16/2025 11:50 AM

## 2025-06-16 NOTE — Clinical Note
Closure device placed in the right radial artery. Site closed by radial compression system. Deployed By: Mitra Holman RT

## 2025-06-16 NOTE — PROGRESS NOTES
Patient is stable status post LHC under the care of Dr. Garsia.  Discussed results of procedure with patient and her family member.  Pictures provided.  Findings of the LHC revealed normal coronary arteries and a normal LVEF.  Medical management is advised.  Please see procedural report for complete details.  Patient is scheduled to follow-up with Dr. Gordon in 2 to 3 weeks.  Postprocedural activity, restrictions, potential complications, medications and future follow-up discussed at length.  All questions answered.  Both verbalized an understanding.

## 2025-06-17 VITALS
WEIGHT: 119.71 LBS | TEMPERATURE: 98.1 F | RESPIRATION RATE: 16 BRPM | SYSTOLIC BLOOD PRESSURE: 117 MMHG | OXYGEN SATURATION: 95 % | HEART RATE: 57 BPM | DIASTOLIC BLOOD PRESSURE: 70 MMHG | HEIGHT: 62 IN | BODY MASS INDEX: 22.03 KG/M2

## 2025-06-18 ENCOUNTER — HOSPITAL ENCOUNTER (OUTPATIENT)
Dept: RADIOLOGY | Facility: HOSPITAL | Age: 75
Setting detail: OUTPATIENT SURGERY
Discharge: HOME | End: 2025-06-18
Payer: MEDICARE

## 2025-06-18 ENCOUNTER — APPOINTMENT (OUTPATIENT)
Dept: PRIMARY CARE | Facility: CLINIC | Age: 75
End: 2025-06-18
Payer: MEDICARE

## 2025-06-18 VITALS
SYSTOLIC BLOOD PRESSURE: 118 MMHG | HEART RATE: 68 BPM | DIASTOLIC BLOOD PRESSURE: 62 MMHG | OXYGEN SATURATION: 97 % | RESPIRATION RATE: 16 BRPM | WEIGHT: 120 LBS | BODY MASS INDEX: 22.08 KG/M2 | HEIGHT: 62 IN | TEMPERATURE: 97.4 F

## 2025-06-18 DIAGNOSIS — K50.919 CROHN'S DISEASE WITH COMPLICATION, UNSPECIFIED GASTROINTESTINAL TRACT LOCATION: ICD-10-CM

## 2025-06-18 DIAGNOSIS — L98.9 SKIN LESION: ICD-10-CM

## 2025-06-18 DIAGNOSIS — Z79.899 MEDICATION MANAGEMENT: ICD-10-CM

## 2025-06-18 DIAGNOSIS — R93.89 ABNORMAL CHEST X-RAY: ICD-10-CM

## 2025-06-18 DIAGNOSIS — C15.9 ESOPHAGEAL ADENOCARCINOMA (MULTI): ICD-10-CM

## 2025-06-18 DIAGNOSIS — F32.0 DEPRESSION, MAJOR, SINGLE EPISODE, MILD: ICD-10-CM

## 2025-06-18 DIAGNOSIS — C73 PAPILLARY THYROID CARCINOMA: ICD-10-CM

## 2025-06-18 DIAGNOSIS — R06.7 SNEEZING: ICD-10-CM

## 2025-06-18 DIAGNOSIS — J18.9 PNEUMONIA DUE TO INFECTIOUS ORGANISM, UNSPECIFIED LATERALITY, UNSPECIFIED PART OF LUNG: ICD-10-CM

## 2025-06-18 DIAGNOSIS — L30.9 DERMATITIS: Primary | ICD-10-CM

## 2025-06-18 DIAGNOSIS — A41.9 SEPSIS, DUE TO UNSPECIFIED ORGANISM, UNSPECIFIED WHETHER ACUTE ORGAN DYSFUNCTION PRESENT (MULTI): ICD-10-CM

## 2025-06-18 DIAGNOSIS — F41.9 ANXIETY: ICD-10-CM

## 2025-06-18 DIAGNOSIS — M06.9 RHEUMATOID ARTHRITIS, INVOLVING UNSPECIFIED SITE, UNSPECIFIED WHETHER RHEUMATOID FACTOR PRESENT (MULTI): ICD-10-CM

## 2025-06-18 DIAGNOSIS — C77.2 SECONDARY AND UNSPECIFIED MALIGNANT NEOPLASM OF INTRA-ABDOMINAL LYMPH NODES (MULTI): ICD-10-CM

## 2025-06-18 DIAGNOSIS — E78.2 MIXED HYPERLIPIDEMIA: ICD-10-CM

## 2025-06-18 LAB — POC SARS-COV-2 AG BINAX: NORMAL

## 2025-06-18 PROCEDURE — 99214 OFFICE O/P EST MOD 30 MIN: CPT | Performed by: FAMILY MEDICINE

## 2025-06-18 PROCEDURE — 1036F TOBACCO NON-USER: CPT | Performed by: FAMILY MEDICINE

## 2025-06-18 PROCEDURE — 71046 X-RAY EXAM CHEST 2 VIEWS: CPT

## 2025-06-18 PROCEDURE — 71046 X-RAY EXAM CHEST 2 VIEWS: CPT | Performed by: STUDENT IN AN ORGANIZED HEALTH CARE EDUCATION/TRAINING PROGRAM

## 2025-06-18 PROCEDURE — 87811 SARS-COV-2 COVID19 W/OPTIC: CPT | Performed by: FAMILY MEDICINE

## 2025-06-18 PROCEDURE — 3008F BODY MASS INDEX DOCD: CPT | Performed by: FAMILY MEDICINE

## 2025-06-18 RX ORDER — TRIAMCINOLONE ACETONIDE 1 MG/G
OINTMENT TOPICAL 2 TIMES DAILY PRN
Qty: 15 G | Refills: 5 | Status: SHIPPED | OUTPATIENT
Start: 2025-06-18 | End: 2026-06-18

## 2025-06-18 RX ORDER — KETOCONAZOLE 20 MG/G
CREAM TOPICAL 2 TIMES DAILY
Qty: 60 G | Refills: 3 | Status: SHIPPED | OUTPATIENT
Start: 2025-06-18 | End: 2026-06-18

## 2025-06-18 NOTE — PROGRESS NOTES
"Subjective   Patient ID: Sarah Chavez is a 74 y.o. female who presents for   Chief Complaint   Patient presents with    Follow-up     With pneumonia/sepsis  Had heart cath yesterday  Has been sneezing, runny nose   Covid vax: UTD  Flu: UTD  Pneumo: UTD  RSV: UTD  Shingles: UTD     CRC: declines  Mammogram: 7/2024  Pap: hysterectomy  Lmp: hysterectomy  NORMAL HEART CATH  Always gets either side butt area rash ? Itchy not painful just mentions today as HAS it  Blistery  Wants eval        HPI  Problem List[1]    Surgical History[2]    Review of Systems  This patient has  NO history of seizures/ CAD or CVA    NO history of recent Covid nor flu symptoms,    NO Fever nor chills today,    NO Chest pain, shortness of breath nor paroxysmal nocturnal dyspnea,  NO Nausea, vomiting, nor diarrhea,  NO Hematochezia nor melena,  NO Dysuria, hematuria, nor new incontinence issues  NO new severe headaches nor neurological complaints,  NO new issues with anxiety nor depression nor new psychiatric complaints,  NO suicidal nor homicidal ideations.     OBJECTIVE:  /62   Pulse 68   Temp 36.3 °C (97.4 °F) (Temporal)   Resp 16   Ht 1.575 m (5' 2\")   Wt 54.4 kg (120 lb)   LMP 01/01/1987 (Approximate)   SpO2 97%   BMI 21.95 kg/m²      General:  alert, oriented, no acute distress.  Small blistery area 3-4cm r post thigh buttocks area r sampled for hsv    And small 1-2cm scaly area wrist r        Mood is pleasant,  no signs of emotional distress.   Not appearing intoxicated or altered.   No voiced delusions,   Normal, appropriate behavior.    HEENT: Normocephalic, atraumatic,   Pupils round, reactive to light  Extraocular motions intact and wnl  Tympanic membranes normal    Neck: no nuchal rigidity  No masses palpable.  No carotid bruits.  No thyromegaly.    Respiratory: Equal breath sounds  No wheezes,    trace scant rales,    nor rhonchi  No respiratory distress.    Heart: Regular rate and rhythm, no    murmurs  no " rubs/gallops    Abdomen: no masses palpable, nontender, no rebound nor guarding.    Extremities: NO cyanosis noted, no clubbing.   No edema noted.  2+dorsalis pedis pulses.    Normal-not antalgic, steady gait.    Admission on 06/16/2025   Component Date Value Ref Range Status    Ventricular Rate 06/16/2025 65  BPM Final    Atrial Rate 06/16/2025 65  BPM Final    WV Interval 06/16/2025 128  ms Final    QRS Duration 06/16/2025 102  ms Final    QT Interval 06/16/2025 436  ms Final    QTC Calculation(Bazett) 06/16/2025 453  ms Final    P Axis 06/16/2025 -49  degrees Final    R Axis 06/16/2025 -32  degrees Final    T Axis 06/16/2025 -12  degrees Final    QRS Count 06/16/2025 10  beats Final    Q Onset 06/16/2025 210  ms Final    P Onset 06/16/2025 146  ms Final    P Offset 06/16/2025 196  ms Final    T Offset 06/16/2025 428  ms Final    QTC Fredericia 06/16/2025 447  ms Final    Glucose 06/16/2025 91  74 - 99 mg/dL Final    Sodium 06/16/2025 143  136 - 145 mmol/L Final    Potassium 06/16/2025 4.1  3.5 - 5.3 mmol/L Final    Chloride 06/16/2025 108 (H)  98 - 107 mmol/L Final    Bicarbonate 06/16/2025 27  21 - 32 mmol/L Final    Anion Gap 06/16/2025 12  10 - 20 mmol/L Final    Urea Nitrogen 06/16/2025 8  6 - 23 mg/dL Final    Creatinine 06/16/2025 0.60  0.50 - 1.05 mg/dL Final    eGFR 06/16/2025 >90  >60 mL/min/1.73m*2 Final    Calculations of estimated GFR are performed using the 2021 CKD-EPI Study Refit equation without the race variable for the IDMS-Traceable creatinine methods.  https://jasn.asnjournals.org/content/early/2021/09/22/ASN.9045765865    Calcium 06/16/2025 9.0  8.6 - 10.3 mg/dL Final    WBC 06/16/2025 5.9  4.4 - 11.3 x10*3/uL Final    nRBC 06/16/2025 0.0  0.0 - 0.0 /100 WBCs Final    RBC 06/16/2025 3.88 (L)  4.00 - 5.20 x10*6/uL Final    Hemoglobin 06/16/2025 13.0  12.0 - 16.0 g/dL Final    Hematocrit 06/16/2025 38.9  36.0 - 46.0 % Final    MCV 06/16/2025 100  80 - 100 fL Final    MCH 06/16/2025 33.5  26.0 -  34.0 pg Final    MCHC 06/16/2025 33.4  32.0 - 36.0 g/dL Final    RDW 06/16/2025 13.9  11.5 - 14.5 % Final    Platelets 06/16/2025 478 (H)  150 - 450 x10*3/uL Final        Assessment/Plan     Problem List Items Addressed This Visit       Anxiety    Relevant Orders    Drug Screen, Urine With Reflex to Confirmation    Depression, major, single episode, mild    Esophageal adenocarcinoma (Multi)    Papillary thyroid carcinoma    Rheumatoid arthritis    Crohn's disease (Multi)    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   If not done today-then a phone call for scheduling is expected in a timely manner(within 2 weeks).   If testing is to be done-a result should be available to patient within 2 weeks time unless otherwise specified.   You, the patient or caregiver, are responsible for making sure what was discussed is actually scheduled and completed.  If suboptimal understanding of results of tests or referral reason-a follow up appointment with me should be made.  If above does NOT occur-you are to connect with us for an explanation.  She sees cardio    Follow up at next scheduled visit -as planned or directed today.  Sooner if new or unresolved issues of concern.    Sarah Chavez We know you have a choice for your health care, THANK YOU for choosing  and [facility].  We APPRECIATE YOU.  Sincerely,   Sima Miner MD   (dr. Callahan)             [1]    Patient Active Problem List  Diagnosis    Actinic keratosis    Anxiety    Coronary artery calcification of native artery    Cancer of abdominal esophagus (Multi)    Candidal intertrigo    Cardiomegaly    Cervical paraspinal muscle spasm    Depression, major, single episode, mild    Dysuria    Elevated platelet count    Esophageal adenocarcinoma (Multi)    GERD (gastroesophageal reflux disease)    Globus sensation    Hematuria    Hyperglycemia    Intraepithelial neoplasm of pancreas    Mild aortic insufficiency    Mild diastolic dysfunction    Nausea and/or vomiting    Odynophagia    Papillary thyroid carcinoma    Pleural effusion    Post-splenectomy    Pulmonary embolism    Rheumatoid arthritis    Seasonal allergies    Sleep difficulties    Goiter    Thyroid nodule    Trigger point of neck    Urinary tract infection    Uses feeding tube    Overweight with body mass index (BMI) of 25 to 25.9 in adult    Crohn's disease (Multi)    Encounter for Medicare annual wellness exam    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)    Gastrostomy status (Multi)    Protein-calorie malnutrition, unspecified severity (Multi)    Esophageal dysphagia    Cervical radiculopathy    Mixed hyperlipidemia    Cardiovascular stress test abnormal   [2]   Past Surgical History:  Procedure Laterality Date    APPENDECTOMY  1981    CARDIAC CATHETERIZATION Left 6/16/2025    Procedure: LHC, With LV;  Surgeon: Tabatha Garsia MD;  Location: [facility];  Service: Cardiovascular;  Laterality: Left;    CHOLECYSTECTOMY  1994    COLONOSCOPY  09/2019    early adenomatous glandular changes-due 2024--declines-unable to tolerate prep    CYSTOSCOPY      ESOPHAGUS SURGERY      HYSTERECTOMY  1987    [facility] USO    LITHOTRIPSY      PANCREAS SURGERY  2019    30% of pancreas removed    SHOULDER SURGERY Right 1977    1 pin    SKIN GRAFT Left     Left Foot    SPLENECTOMY, TOTAL      THYROIDECTOMY, PARTIAL

## 2025-06-21 LAB
HSV1 DNA SPEC QL NAA+PROBE: NOT DETECTED
HSV2 DNA SPEC QL NAA+PROBE: DETECTED
SPECIMEN SOURCE: ABNORMAL

## 2025-06-23 DIAGNOSIS — B00.9 HSV-2 (HERPES SIMPLEX VIRUS 2) INFECTION: Primary | ICD-10-CM

## 2025-06-23 RX ORDER — VALACYCLOVIR HYDROCHLORIDE 500 MG/1
500 TABLET, FILM COATED ORAL 2 TIMES DAILY
Qty: 30 TABLET | Refills: 5 | Status: SHIPPED | OUTPATIENT
Start: 2025-06-23 | End: 2025-06-28

## 2025-06-24 ENCOUNTER — APPOINTMENT (OUTPATIENT)
Dept: RADIOLOGY | Facility: HOSPITAL | Age: 75
DRG: 178 | End: 2025-06-24
Payer: MEDICARE

## 2025-06-24 ENCOUNTER — TELEPHONE (OUTPATIENT)
Dept: PRIMARY CARE | Facility: CLINIC | Age: 75
End: 2025-06-24
Payer: MEDICARE

## 2025-06-24 ENCOUNTER — APPOINTMENT (OUTPATIENT)
Dept: CARDIOLOGY | Facility: HOSPITAL | Age: 75
DRG: 178 | End: 2025-06-24
Payer: MEDICARE

## 2025-06-24 ENCOUNTER — HOSPITAL ENCOUNTER (INPATIENT)
Facility: HOSPITAL | Age: 75
LOS: 2 days | Discharge: HOME | DRG: 178 | End: 2025-06-26
Attending: EMERGENCY MEDICINE | Admitting: INTERNAL MEDICINE
Payer: MEDICARE

## 2025-06-24 DIAGNOSIS — J90 LOCULATED PLEURAL EFFUSION: ICD-10-CM

## 2025-06-24 DIAGNOSIS — J18.9 PNEUMONIA OF RIGHT LOWER LOBE DUE TO INFECTIOUS ORGANISM: Primary | ICD-10-CM

## 2025-06-24 DIAGNOSIS — Z78.9 FAILURE OF OUTPATIENT TREATMENT: ICD-10-CM

## 2025-06-24 LAB
ALBUMIN SERPL BCP-MCNC: 3.7 G/DL (ref 3.4–5)
ALP SERPL-CCNC: 79 U/L (ref 33–136)
ALT SERPL W P-5'-P-CCNC: 13 U/L (ref 7–45)
ANION GAP SERPL CALC-SCNC: 12 MMOL/L (ref 10–20)
AST SERPL W P-5'-P-CCNC: 16 U/L (ref 9–39)
ATRIAL RATE: 65 BPM
BASOPHILS # BLD AUTO: 0.07 X10*3/UL (ref 0–0.1)
BASOPHILS NFR BLD AUTO: 0.6 %
BILIRUB SERPL-MCNC: 1.1 MG/DL (ref 0–1.2)
BUN SERPL-MCNC: 10 MG/DL (ref 6–23)
CALCIUM SERPL-MCNC: 9 MG/DL (ref 8.6–10.3)
CARDIAC TROPONIN I PNL SERPL HS: 8 NG/L (ref 0–13)
CHLORIDE SERPL-SCNC: 105 MMOL/L (ref 98–107)
CO2 SERPL-SCNC: 26 MMOL/L (ref 21–32)
CREAT SERPL-MCNC: 0.6 MG/DL (ref 0.5–1.05)
EGFRCR SERPLBLD CKD-EPI 2021: >90 ML/MIN/1.73M*2
EOSINOPHIL # BLD AUTO: 0.08 X10*3/UL (ref 0–0.4)
EOSINOPHIL NFR BLD AUTO: 0.7 %
ERYTHROCYTE [DISTWIDTH] IN BLOOD BY AUTOMATED COUNT: 14.2 % (ref 11.5–14.5)
GLUCOSE SERPL-MCNC: 128 MG/DL (ref 74–99)
HCT VFR BLD AUTO: 39.1 % (ref 36–46)
HGB BLD-MCNC: 13 G/DL (ref 12–16)
IMM GRANULOCYTES # BLD AUTO: 0.04 X10*3/UL (ref 0–0.5)
IMM GRANULOCYTES NFR BLD AUTO: 0.4 % (ref 0–0.9)
INR PPP: 1.1 (ref 0.9–1.1)
LYMPHOCYTES # BLD AUTO: 2.33 X10*3/UL (ref 0.8–3)
LYMPHOCYTES NFR BLD AUTO: 21.1 %
MAGNESIUM SERPL-MCNC: 2.07 MG/DL (ref 1.6–2.4)
MCH RBC QN AUTO: 33 PG (ref 26–34)
MCHC RBC AUTO-ENTMCNC: 33.2 G/DL (ref 32–36)
MCV RBC AUTO: 99 FL (ref 80–100)
MONOCYTES # BLD AUTO: 1.63 X10*3/UL (ref 0.05–0.8)
MONOCYTES NFR BLD AUTO: 14.8 %
NEUTROPHILS # BLD AUTO: 6.87 X10*3/UL (ref 1.6–5.5)
NEUTROPHILS NFR BLD AUTO: 62.4 %
NRBC BLD-RTO: 0 /100 WBCS (ref 0–0)
P AXIS: -43 DEGREES
P OFFSET: 198 MS
P ONSET: 149 MS
PLATELET # BLD AUTO: 215 X10*3/UL (ref 150–450)
POTASSIUM SERPL-SCNC: 4.1 MMOL/L (ref 3.5–5.3)
PR INTERVAL: 116 MS
PROT SERPL-MCNC: 6.5 G/DL (ref 6.4–8.2)
PROTHROMBIN TIME: 11.8 SECONDS (ref 9.8–12.4)
Q ONSET: 207 MS
QRS COUNT: 11 BEATS
QRS DURATION: 112 MS
QT INTERVAL: 450 MS
QTC CALCULATION(BAZETT): 468 MS
QTC FREDERICIA: 462 MS
R AXIS: -27 DEGREES
RBC # BLD AUTO: 3.94 X10*6/UL (ref 4–5.2)
SODIUM SERPL-SCNC: 139 MMOL/L (ref 136–145)
T AXIS: -30 DEGREES
T OFFSET: 432 MS
VENTRICULAR RATE: 65 BPM
WBC # BLD AUTO: 11 X10*3/UL (ref 4.4–11.3)

## 2025-06-24 PROCEDURE — 85025 COMPLETE CBC W/AUTO DIFF WBC: CPT | Performed by: NURSE PRACTITIONER

## 2025-06-24 PROCEDURE — 2500000001 HC RX 250 WO HCPCS SELF ADMINISTERED DRUGS (ALT 637 FOR MEDICARE OP): Performed by: INTERNAL MEDICINE

## 2025-06-24 PROCEDURE — 96374 THER/PROPH/DIAG INJ IV PUSH: CPT

## 2025-06-24 PROCEDURE — 2500000002 HC RX 250 W HCPCS SELF ADMINISTERED DRUGS (ALT 637 FOR MEDICARE OP, ALT 636 FOR OP/ED): Performed by: INTERNAL MEDICINE

## 2025-06-24 PROCEDURE — 36415 COLL VENOUS BLD VENIPUNCTURE: CPT | Performed by: NURSE PRACTITIONER

## 2025-06-24 PROCEDURE — 96375 TX/PRO/DX INJ NEW DRUG ADDON: CPT

## 2025-06-24 PROCEDURE — 71275 CT ANGIOGRAPHY CHEST: CPT | Mod: FOREIGN READ | Performed by: RADIOLOGY

## 2025-06-24 PROCEDURE — 2500000004 HC RX 250 GENERAL PHARMACY W/ HCPCS (ALT 636 FOR OP/ED): Performed by: EMERGENCY MEDICINE

## 2025-06-24 PROCEDURE — 99223 1ST HOSP IP/OBS HIGH 75: CPT | Performed by: INTERNAL MEDICINE

## 2025-06-24 PROCEDURE — 83735 ASSAY OF MAGNESIUM: CPT | Performed by: NURSE PRACTITIONER

## 2025-06-24 PROCEDURE — 1200000002 HC GENERAL ROOM WITH TELEMETRY DAILY

## 2025-06-24 PROCEDURE — 71275 CT ANGIOGRAPHY CHEST: CPT

## 2025-06-24 PROCEDURE — 93005 ELECTROCARDIOGRAM TRACING: CPT

## 2025-06-24 PROCEDURE — 2500000004 HC RX 250 GENERAL PHARMACY W/ HCPCS (ALT 636 FOR OP/ED)

## 2025-06-24 PROCEDURE — 99285 EMERGENCY DEPT VISIT HI MDM: CPT | Mod: 25 | Performed by: EMERGENCY MEDICINE

## 2025-06-24 PROCEDURE — 2550000001 HC RX 255 CONTRASTS: Performed by: NURSE PRACTITIONER

## 2025-06-24 PROCEDURE — 80053 COMPREHEN METABOLIC PANEL: CPT | Performed by: NURSE PRACTITIONER

## 2025-06-24 PROCEDURE — 2500000004 HC RX 250 GENERAL PHARMACY W/ HCPCS (ALT 636 FOR OP/ED): Performed by: NURSE PRACTITIONER

## 2025-06-24 PROCEDURE — 84484 ASSAY OF TROPONIN QUANT: CPT | Performed by: NURSE PRACTITIONER

## 2025-06-24 PROCEDURE — 2500000004 HC RX 250 GENERAL PHARMACY W/ HCPCS (ALT 636 FOR OP/ED): Performed by: INTERNAL MEDICINE

## 2025-06-24 PROCEDURE — 85610 PROTHROMBIN TIME: CPT | Performed by: NURSE PRACTITIONER

## 2025-06-24 RX ORDER — ASPIRIN 325 MG
500 TABLET, DELAYED RELEASE (ENTERIC COATED) ORAL DAILY
COMMUNITY

## 2025-06-24 RX ORDER — VALACYCLOVIR HYDROCHLORIDE 500 MG/1
500 TABLET, FILM COATED ORAL 2 TIMES DAILY
Status: DISCONTINUED | OUTPATIENT
Start: 2025-06-24 | End: 2025-06-26 | Stop reason: HOSPADM

## 2025-06-24 RX ORDER — ENOXAPARIN SODIUM 100 MG/ML
40 INJECTION SUBCUTANEOUS EVERY 24 HOURS
Status: DISCONTINUED | OUTPATIENT
Start: 2025-06-24 | End: 2025-06-26 | Stop reason: HOSPADM

## 2025-06-24 RX ORDER — VANCOMYCIN HYDROCHLORIDE 1 G/20ML
INJECTION, POWDER, LYOPHILIZED, FOR SOLUTION INTRAVENOUS DAILY PRN
Status: DISCONTINUED | OUTPATIENT
Start: 2025-06-24 | End: 2025-06-25

## 2025-06-24 RX ORDER — ACETAMINOPHEN 325 MG/1
650 TABLET ORAL EVERY 4 HOURS PRN
Status: DISCONTINUED | OUTPATIENT
Start: 2025-06-24 | End: 2025-06-26 | Stop reason: HOSPADM

## 2025-06-24 RX ORDER — ACETAMINOPHEN 650 MG/1
650 SUPPOSITORY RECTAL EVERY 4 HOURS PRN
Status: DISCONTINUED | OUTPATIENT
Start: 2025-06-24 | End: 2025-06-26 | Stop reason: HOSPADM

## 2025-06-24 RX ORDER — POLYETHYLENE GLYCOL 3350 17 G/17G
17 POWDER, FOR SOLUTION ORAL DAILY
Status: DISCONTINUED | OUTPATIENT
Start: 2025-06-24 | End: 2025-06-26 | Stop reason: HOSPADM

## 2025-06-24 RX ORDER — ACETAMINOPHEN 160 MG/5ML
650 SOLUTION ORAL EVERY 4 HOURS PRN
Status: DISCONTINUED | OUTPATIENT
Start: 2025-06-24 | End: 2025-06-26 | Stop reason: HOSPADM

## 2025-06-24 RX ORDER — MORPHINE SULFATE 4 MG/ML
4 INJECTION, SOLUTION INTRAMUSCULAR; INTRAVENOUS ONCE
Status: COMPLETED | OUTPATIENT
Start: 2025-06-24 | End: 2025-06-24

## 2025-06-24 RX ORDER — ONDANSETRON HYDROCHLORIDE 2 MG/ML
4 INJECTION, SOLUTION INTRAVENOUS ONCE
Status: COMPLETED | OUTPATIENT
Start: 2025-06-24 | End: 2025-06-24

## 2025-06-24 RX ORDER — BISACODYL 5 MG
10 TABLET, DELAYED RELEASE (ENTERIC COATED) ORAL DAILY PRN
Status: DISCONTINUED | OUTPATIENT
Start: 2025-06-24 | End: 2025-06-26 | Stop reason: HOSPADM

## 2025-06-24 RX ORDER — ASPIRIN 81 MG/1
81 TABLET ORAL DAILY
Status: DISCONTINUED | OUTPATIENT
Start: 2025-06-24 | End: 2025-06-26 | Stop reason: HOSPADM

## 2025-06-24 RX ORDER — DIPHENHYDRAMINE HYDROCHLORIDE 50 MG/ML
25 INJECTION, SOLUTION INTRAMUSCULAR; INTRAVENOUS ONCE
Status: COMPLETED | OUTPATIENT
Start: 2025-06-24 | End: 2025-06-24

## 2025-06-24 RX ORDER — ALBUTEROL SULFATE 90 UG/1
2 INHALANT RESPIRATORY (INHALATION) EVERY 6 HOURS PRN
Status: DISCONTINUED | OUTPATIENT
Start: 2025-06-24 | End: 2025-06-26 | Stop reason: HOSPADM

## 2025-06-24 RX ORDER — VANCOMYCIN/0.9 % SOD CHLORIDE 1.5G/250ML
1.5 PLASTIC BAG, INJECTION (ML) INTRAVENOUS ONCE
Status: COMPLETED | OUTPATIENT
Start: 2025-06-24 | End: 2025-06-24

## 2025-06-24 RX ORDER — ALPRAZOLAM 0.25 MG/1
0.25 TABLET ORAL NIGHTLY
Status: DISCONTINUED | OUTPATIENT
Start: 2025-06-24 | End: 2025-06-26 | Stop reason: HOSPADM

## 2025-06-24 RX ORDER — PANTOPRAZOLE SODIUM 40 MG/1
40 TABLET, DELAYED RELEASE ORAL
Status: DISCONTINUED | OUTPATIENT
Start: 2025-06-25 | End: 2025-06-26 | Stop reason: HOSPADM

## 2025-06-24 RX ORDER — GABAPENTIN 100 MG/1
200 CAPSULE ORAL DAILY
Status: DISCONTINUED | OUTPATIENT
Start: 2025-06-24 | End: 2025-06-26 | Stop reason: HOSPADM

## 2025-06-24 RX ORDER — IBUPROFEN 400 MG/1
400 TABLET, FILM COATED ORAL EVERY 4 HOURS PRN
Status: DISCONTINUED | OUTPATIENT
Start: 2025-06-24 | End: 2025-06-25

## 2025-06-24 RX ADMIN — ASPIRIN 81 MG: 81 TABLET, COATED ORAL at 20:17

## 2025-06-24 RX ADMIN — POLYETHYLENE GLYCOL 3350 17 G: 17 POWDER, FOR SOLUTION ORAL at 20:18

## 2025-06-24 RX ADMIN — PIPERACILLIN AND TAZOBACTAM 4.5 G: 4; .5 INJECTION, POWDER, FOR SOLUTION INTRAVENOUS at 15:48

## 2025-06-24 RX ADMIN — VALACYCLOVIR HYDROCHLORIDE 500 MG: 500 TABLET, FILM COATED ORAL at 20:17

## 2025-06-24 RX ADMIN — IOHEXOL 75 ML: 350 INJECTION, SOLUTION INTRAVENOUS at 11:30

## 2025-06-24 RX ADMIN — ONDANSETRON 4 MG: 2 INJECTION INTRAMUSCULAR; INTRAVENOUS at 11:13

## 2025-06-24 RX ADMIN — Medication 1.5 G: at 16:29

## 2025-06-24 RX ADMIN — DIPHENHYDRAMINE HYDROCHLORIDE 25 MG: 50 INJECTION, SOLUTION INTRAMUSCULAR; INTRAVENOUS at 18:12

## 2025-06-24 RX ADMIN — ALPRAZOLAM 0.25 MG: 0.25 TABLET ORAL at 20:17

## 2025-06-24 RX ADMIN — ENOXAPARIN SODIUM 40 MG: 40 INJECTION SUBCUTANEOUS at 20:18

## 2025-06-24 RX ADMIN — GABAPENTIN 200 MG: 100 CAPSULE ORAL at 20:17

## 2025-06-24 RX ADMIN — MORPHINE SULFATE 4 MG: 4 INJECTION, SOLUTION INTRAMUSCULAR; INTRAVENOUS at 11:12

## 2025-06-24 RX ADMIN — MORPHINE SULFATE 4 MG: 4 INJECTION, SOLUTION INTRAMUSCULAR; INTRAVENOUS at 15:48

## 2025-06-24 RX ADMIN — PIPERACILLIN SODIUM AND TAZOBACTAM SODIUM 3.38 G: 3; .375 INJECTION, SOLUTION INTRAVENOUS at 20:49

## 2025-06-24 SDOH — SOCIAL STABILITY: SOCIAL INSECURITY: ARE YOU OR HAVE YOU BEEN THREATENED OR ABUSED PHYSICALLY, EMOTIONALLY, OR SEXUALLY BY ANYONE?: NO

## 2025-06-24 SDOH — SOCIAL STABILITY: SOCIAL INSECURITY: DOES ANYONE TRY TO KEEP YOU FROM HAVING/CONTACTING OTHER FRIENDS OR DOING THINGS OUTSIDE YOUR HOME?: NO

## 2025-06-24 SDOH — SOCIAL STABILITY: SOCIAL INSECURITY: HAS ANYONE EVER THREATENED TO HURT YOUR FAMILY OR YOUR PETS?: NO

## 2025-06-24 SDOH — SOCIAL STABILITY: SOCIAL INSECURITY: DO YOU FEEL UNSAFE GOING BACK TO THE PLACE WHERE YOU ARE LIVING?: NO

## 2025-06-24 SDOH — SOCIAL STABILITY: SOCIAL INSECURITY: ABUSE: ADULT

## 2025-06-24 SDOH — SOCIAL STABILITY: SOCIAL INSECURITY: HAVE YOU HAD THOUGHTS OF HARMING ANYONE ELSE?: NO

## 2025-06-24 SDOH — SOCIAL STABILITY: SOCIAL INSECURITY: ARE THERE ANY APPARENT SIGNS OF INJURIES/BEHAVIORS THAT COULD BE RELATED TO ABUSE/NEGLECT?: NO

## 2025-06-24 SDOH — SOCIAL STABILITY: SOCIAL INSECURITY: WERE YOU ABLE TO COMPLETE ALL THE BEHAVIORAL HEALTH SCREENINGS?: YES

## 2025-06-24 SDOH — SOCIAL STABILITY: SOCIAL INSECURITY: DO YOU FEEL ANYONE HAS EXPLOITED OR TAKEN ADVANTAGE OF YOU FINANCIALLY OR OF YOUR PERSONAL PROPERTY?: NO

## 2025-06-24 SDOH — SOCIAL STABILITY: SOCIAL INSECURITY: HAVE YOU HAD ANY THOUGHTS OF HARMING ANYONE ELSE?: NO

## 2025-06-24 ASSESSMENT — ENCOUNTER SYMPTOMS
SHORTNESS OF BREATH: 1
EYES NEGATIVE: 1
CONSTITUTIONAL NEGATIVE: 1
ALLERGIC/IMMUNOLOGIC NEGATIVE: 1
MUSCULOSKELETAL NEGATIVE: 1
ENDOCRINE NEGATIVE: 1
CARDIOVASCULAR NEGATIVE: 1
GASTROINTESTINAL NEGATIVE: 1
PSYCHIATRIC NEGATIVE: 1
CHEST TIGHTNESS: 1

## 2025-06-24 ASSESSMENT — COGNITIVE AND FUNCTIONAL STATUS - GENERAL
STANDING UP FROM CHAIR USING ARMS: A LOT
DRESSING REGULAR LOWER BODY CLOTHING: A LOT
PATIENT BASELINE BEDBOUND: NO
DAILY ACTIVITIY SCORE: 17
MOVING TO AND FROM BED TO CHAIR: A LOT
EATING MEALS: A LITTLE
TURNING FROM BACK TO SIDE WHILE IN FLAT BAD: A LOT
PERSONAL GROOMING: A LOT
MOVING FROM LYING ON BACK TO SITTING ON SIDE OF FLAT BED WITH BEDRAILS: A LOT
TOILETING: A LOT
MOBILITY SCORE: 14
WALKING IN HOSPITAL ROOM: A LOT

## 2025-06-24 ASSESSMENT — LIFESTYLE VARIABLES
HOW OFTEN DO YOU HAVE A DRINK CONTAINING ALCOHOL: 2-4 TIMES A MONTH
HAVE PEOPLE ANNOYED YOU BY CRITICIZING YOUR DRINKING: NO
HAVE YOU EVER FELT YOU SHOULD CUT DOWN ON YOUR DRINKING: NO
EVER FELT BAD OR GUILTY ABOUT YOUR DRINKING: NO
TOTAL SCORE: 0
SKIP TO QUESTIONS 9-10: 1
HOW MANY STANDARD DRINKS CONTAINING ALCOHOL DO YOU HAVE ON A TYPICAL DAY: 1 OR 2
EVER HAD A DRINK FIRST THING IN THE MORNING TO STEADY YOUR NERVES TO GET RID OF A HANGOVER: NO
AUDIT-C TOTAL SCORE: 2
HOW OFTEN DO YOU HAVE 6 OR MORE DRINKS ON ONE OCCASION: NEVER
AUDIT-C TOTAL SCORE: 2

## 2025-06-24 ASSESSMENT — PATIENT HEALTH QUESTIONNAIRE - PHQ9
1. LITTLE INTEREST OR PLEASURE IN DOING THINGS: NOT AT ALL
SUM OF ALL RESPONSES TO PHQ9 QUESTIONS 1 & 2: 0
2. FEELING DOWN, DEPRESSED OR HOPELESS: NOT AT ALL

## 2025-06-24 ASSESSMENT — PAIN - FUNCTIONAL ASSESSMENT
PAIN_FUNCTIONAL_ASSESSMENT: 0-10

## 2025-06-24 ASSESSMENT — PAIN DESCRIPTION - PROGRESSION
CLINICAL_PROGRESSION: GRADUALLY IMPROVING
CLINICAL_PROGRESSION: GRADUALLY IMPROVING

## 2025-06-24 ASSESSMENT — ACTIVITIES OF DAILY LIVING (ADL)
TOILETING: INDEPENDENT
WALKS IN HOME: INDEPENDENT
DRESSING YOURSELF: INDEPENDENT
HEARING - RIGHT EAR: FUNCTIONAL
GROOMING: INDEPENDENT
PATIENT'S MEMORY ADEQUATE TO SAFELY COMPLETE DAILY ACTIVITIES?: YES
BATHING: INDEPENDENT
ADEQUATE_TO_COMPLETE_ADL: YES
HEARING - LEFT EAR: FUNCTIONAL
FEEDING YOURSELF: INDEPENDENT
JUDGMENT_ADEQUATE_SAFELY_COMPLETE_DAILY_ACTIVITIES: YES

## 2025-06-24 ASSESSMENT — PAIN SCALES - GENERAL
PAINLEVEL_OUTOF10: 5 - MODERATE PAIN
PAINLEVEL_OUTOF10: 9
PAINLEVEL_OUTOF10: 0 - NO PAIN
PAINLEVEL_OUTOF10: 9
PAINLEVEL_OUTOF10: 4

## 2025-06-24 ASSESSMENT — PAIN DESCRIPTION - LOCATION
LOCATION: BREAST

## 2025-06-24 ASSESSMENT — PAIN DESCRIPTION - PAIN TYPE
TYPE: ACUTE PAIN
TYPE: ACUTE PAIN

## 2025-06-24 NOTE — H&P
History Of Present Illness  Sarah Chavez is a 74 y.o. female with past medical history of rheumatoid arthritis, hyperlipidemia, coronary artery disease, history of esophageal cancer adenocarcinoma with treatment team 2022 comes to the hospital due to right-sided pleuritic chest pain.  The patient states she has completed the course of doxycycline in outpatient settings but she states she has recurrence of right-sided pleuritic chest pain.  The patient states the pain is severe and is worse when she is coughing.  She currently denies any sputum production and is on room air.  Denies any fever chills nausea vomiting abdominal pain.  Patient recently had left heart catheterization that revealed normal coronary arteries.  CT PE study done in ED revealed no pulmonary embolism but revealed right lower lobe infiltrates and small loculated pleural effusion.  ED did discuss with interventional radiology possible aspiration but per ED radiology does not think that the loculated pleural effusion is significant enough for aspiration.  Patient is admitted for further care and management.     Past Medical History  Medical History[1]    Surgical History  Surgical History[2]     Social History  She reports that she quit smoking about 12 years ago. Her smoking use included cigarettes. She has never been exposed to tobacco smoke. She has never used smokeless tobacco. She reports that she does not currently use alcohol. She reports that she does not use drugs.    Family History  Family History[3]     Allergies  Atorvastatin, Rosuvastatin, and Statins-hmg-coa reductase inhibitors    Review of Systems   Constitutional: Negative.    HENT: Negative.     Eyes: Negative.    Respiratory:  Positive for chest tightness and shortness of breath.    Cardiovascular: Negative.    Gastrointestinal: Negative.    Endocrine: Negative.    Musculoskeletal: Negative.    Skin: Negative.    Allergic/Immunologic: Negative.    Psychiatric/Behavioral:  "Negative.     All other systems reviewed and are negative.       Physical Exam  Constitutional:       Appearance: Normal appearance.   HENT:      Head: Normocephalic and atraumatic.      Nose: Nose normal.      Mouth/Throat:      Mouth: Mucous membranes are dry.   Eyes:      Extraocular Movements: Extraocular movements intact.      Conjunctiva/sclera: Conjunctivae normal.   Cardiovascular:      Rate and Rhythm: Normal rate and regular rhythm.      Pulses: Normal pulses.      Heart sounds: Normal heart sounds.   Pulmonary:      Effort: Pulmonary effort is normal.      Breath sounds: Normal breath sounds.   Chest:      Chest wall: Tenderness present.   Abdominal:      General: Bowel sounds are normal.      Palpations: Abdomen is soft.   Musculoskeletal:         General: Normal range of motion.      Cervical back: Normal range of motion and neck supple.   Skin:     General: Skin is warm and dry.   Neurological:      General: No focal deficit present.      Mental Status: She is alert and oriented to person, place, and time. Mental status is at baseline.   Psychiatric:         Mood and Affect: Mood normal.          Last Recorded Vitals  Blood pressure 115/66, pulse 66, temperature 36.4 °C (97.5 °F), resp. rate 20, height 1.575 m (5' 2\"), weight 54 kg (119 lb), last menstrual period 01/01/1987, SpO2 (!) 92%.    Relevant Results        Sarah Chavez is a 74 y.o. female with past medical history Esophageal Cancer s/p Esophagectomy + jejunostomy & FOLFOX + 1 year neoadjuvant Nivolumab (2022), Anxiety, Tobacco Abuse, prior PE (completed 6 months of AC, HLD comes to the hospital due to right-sided pleuritic chest pain.      Assessment & Plan  Pneumonia of right lower lobe due to infectious organism      Right lower lobe pneumonia  Pleuritic chest pain  - Patient with multiple visits and has already completed a course of doxycycline in outpatient settings for right lower lobe pneumonia.  - CT PE study reveals no evidence " of PE but reveals right lower lobe infiltrates suspicious for pneumonia and small loculated right pleural effusion.  - ED discussed with IR for IR effusions are not large enough for aspiration/biopsy  - Continue with vancomycin and Zosyn  - Patient remains on room air.  Troponins are normal recent left heart cath reveals normal coronaries.  - Consult pulmonology.  The patient was admitted to different hospital recently with similar presentation.    History of esophageal cancer  - Per the patient she completed treatment in 2022 and per the patient it has been in remission.  She does get surveillance CT scan every 6 months.  - Continue with outpatient follow-up    Anxiety and depression  Rheumatoid arthritis  - Continue with home medications    VTE prophylaxis-Lovenox      I spent 50 minutes in the professional and overall care of this patient.      Scott Mack MD         [1]   Past Medical History:  Diagnosis Date    Abdominal wall pain     Abnormal EKG     Anxiety     Bilateral kidney stones     Crohn's disease (Multi)     Dysphagia     GERD (gastroesophageal reflux disease)     History of blood transfusion     History of esophageal cancer     History of goiter     History of mammogram 07/05/2024    cat 1    Hx of chronic pancreatitis     Pulmonary embolism     History Of    Seasonal allergies     Thyrotoxicosis     Wears reading eyeglasses    [2]   Past Surgical History:  Procedure Laterality Date    APPENDECTOMY  1981    CARDIAC CATHETERIZATION Left 6/16/2025    Procedure: LHC, With LV;  Surgeon: Tabatha Garsia MD;  Location: [facility];  Service: Cardiovascular;  Laterality: Left;    CHOLECYSTECTOMY  1994    COLONOSCOPY  09/2019    early adenomatous glandular changes-due 2024--declines-unable to tolerate prep    CYSTOSCOPY      ESOPHAGUS SURGERY      HYSTERECTOMY  1987    STEVO USO    LITHOTRIPSY      PANCREAS SURGERY  2019    30% of pancreas removed    SHOULDER SURGERY Right 1977    1 pin    SKIN  GRAFT Left     Left Foot    SPLENECTOMY, TOTAL      THYROIDECTOMY, PARTIAL     [3]   Family History  Problem Relation Name Age of Onset    Leukemia Mother      Other (motor vehicle accident) Father

## 2025-06-24 NOTE — ED PROVIDER NOTES
HPI   Chief Complaint   Patient presents with    Breast Pain     Under rt side when deep breathing       74-year-old female presents emergency departments, tells me she has been having pain under her right breast since Memorial Day weekend, was seen and had an x-ray performed, diagnosed with pleurisy, discharged home, states pain was better for couple days and then got worse, was reseen, diagnosed with pneumonia admitted to the hospital.  She tells me that she then followed up with her doctor who repeated the x-ray and order 10 more days of antibiotics for her.  Patient states this pain seems to be coming and going but overnight last night significantly worse.  States it hurts to try to take a deep breath at all.  She has me she does have history of esophageal cancer, and gets CT surveillance every 6 months, is due for scan in July.      History provided by:  Patient   used: No            Patient History   Medical History[1]  Surgical History[2]  Family History[3]  Social History[4]    Physical Exam   ED Triage Vitals [06/24/25 1024]   Temperature Heart Rate Respirations BP   36.8 °C (98.2 °F) 70 18 140/71      Pulse Ox Temp src Heart Rate Source Patient Position   95 % -- -- --      BP Location FiO2 (%)     -- --       Physical Exam  Constitutional: Vitals noted, no distress. Afebrile.   Cardiovascular: Regular, rate, rhythm, no murmur.   Pulmonary: Lungs clear bilaterally with good aeration. No adventitious breath sounds.   Gastrointestinal: Soft, nonsurgical. Nontender. No peritoneal signs. Normoactive bowel sounds.   Musculoskeletal: No peripheral edema. Negative Homans bilaterally, no cords.   Skin: No rash.   Neuro: No focal neurologic deficits, NIH score of 0.      ED Course & MDM   Diagnoses as of 06/24/25 1410   Pneumonia of right lower lobe due to infectious organism   Loculated pleural effusion   Failure of outpatient treatment          Labs Reviewed   CBC WITH AUTO DIFFERENTIAL -  Abnormal       Result Value    WBC 11.0      nRBC 0.0      RBC 3.94 (*)     Hemoglobin 13.0      Hematocrit 39.1      MCV 99      MCH 33.0      MCHC 33.2      RDW 14.2      Platelets 215      Neutrophils % 62.4      Immature Granulocytes %, Automated 0.4      Lymphocytes % 21.1      Monocytes % 14.8      Eosinophils % 0.7      Basophils % 0.6      Neutrophils Absolute 6.87 (*)     Immature Granulocytes Absolute, Automated 0.04      Lymphocytes Absolute 2.33      Monocytes Absolute 1.63 (*)     Eosinophils Absolute 0.08      Basophils Absolute 0.07     COMPREHENSIVE METABOLIC PANEL - Abnormal    Glucose 128 (*)     Sodium 139      Potassium 4.1      Chloride 105      Bicarbonate 26      Anion Gap 12      Urea Nitrogen 10      Creatinine 0.60      eGFR >90      Calcium 9.0      Albumin 3.7      Alkaline Phosphatase 79      Total Protein 6.5      AST 16      Bilirubin, Total 1.1      ALT 13     MAGNESIUM - Normal    Magnesium 2.07     PROTIME-INR - Normal    Protime 11.8      INR 1.1     TROPONIN I, HIGH SENSITIVITY - Normal    Troponin I, High Sensitivity 8      Narrative:     Less than 99th percentile of normal range cutoff-  Female and children under 18 years old <14 ng/L; Male <21 ng/L: Negative  Repeat testing should be performed if clinically indicated.     Female and children under 18 years old 14-50 ng/L; Male 21-50 ng/L:  Consistent with possible cardiac damage and possible increased clinical   risk. Serial measurements may help to assess extent of myocardial damage.     >50 ng/L: Consistent with cardiac damage, increased clinical risk and  myocardial infarction. Serial measurements may help assess extent of   myocardial damage.      NOTE: Children less than 1 year old may have higher baseline troponin   levels and results should be interpreted in conjunction with the overall   clinical context.     NOTE: Troponin I testing is performed using a different   testing methodology at [facility] than  at other   system hospitals. Direct result comparisons should only   be made within the same method.        CT angio chest for pulmonary embolism   Final Result   1.  No evidence of a pulmonary embolus.   2.  Right lower lobe infiltrate, suspicious for pneumonia.     3.  Small loculated right pleural effusion.   4.  Coronary artery calcifications.   5.  Evidence of a gastric pull-through after esophagectomy.   Signed by Maulik Malik MD               No data recorded     Heidi Coma Scale Score: 15 (06/24/25 1021 : Blake Ruiz RN)                   Medical Decision Making    Patient presents with significant pleuritic right-sided pain.  States has been ongoing since Memorial Day, she is been hospitalized for pneumonia and had outpatient treatment for pneumonia but states the pain has reoccurred, worse.    Describes history of esophageal cancer.    EKG obtained at 1042 ventricular rate of 65, interpreted me, showed normal sinus rhythm with incomplete right bundle branch block, nonspecific T wave abnormality noted, no evidence of acute ischemia with acute findings.    Did medicated the patient with 4 mg of IV morphine and 4 mg of IV Zofran for her pain.    Laboratory workup initiated, CBC with a white count of 11.0, hemoglobin 13.0 and platelets of 215.  INR 1.1.  Metabolic panel unremarkable with normal electrolytes, creatinine.  Troponin 8.    I did obtain CT imaging of the chest to rule out pulmonary embolism for her pleuritic pain, negative for PE but does show right lower lobe infiltrate suspicious for pneumonia with loculated right pleural effusion.    Reevaluated the patient, she described improvement of her pain initially but pain starting to come back and now severe so we will order her an additional dose of morphine.    Patient's been on 2 courses of doxycycline, both hospitalized at [facility] and outpatient by her doctor without improvement.    With the Dr Mack who agrees to admission,  outpatient failure, pleural effusion, pneumonia, will start her on Zosyn and vancomycin with broad-spectrum antibiotics.    Shared KALIE Attestation:    I personally saw the patient and made/approved the management plan and take responsibility for the patient management.    History: Patient presenting with right breast pain and mild shortness of breath.    Exam: Regular rate and rhythm cardiac exam with diminished breath sounds bilaterally with some rhonchi in the right lung.  Abdomen is soft and nontender.  Neurological exam is grossly intact.  No palpable cords.    MDM:     Differential Diagnosis: ACS, arrhythmia, pneumonia, PE    Labs Reviewed   CBC WITH AUTO DIFFERENTIAL - Abnormal       Result Value    WBC 11.0      nRBC 0.0      RBC 3.94 (*)     Hemoglobin 13.0      Hematocrit 39.1      MCV 99      MCH 33.0      MCHC 33.2      RDW 14.2      Platelets 215      Neutrophils % 62.4      Immature Granulocytes %, Automated 0.4      Lymphocytes % 21.1      Monocytes % 14.8      Eosinophils % 0.7      Basophils % 0.6      Neutrophils Absolute 6.87 (*)     Immature Granulocytes Absolute, Automated 0.04      Lymphocytes Absolute 2.33      Monocytes Absolute 1.63 (*)     Eosinophils Absolute 0.08      Basophils Absolute 0.07     COMPREHENSIVE METABOLIC PANEL - Abnormal    Glucose 128 (*)     Sodium 139      Potassium 4.1      Chloride 105      Bicarbonate 26      Anion Gap 12      Urea Nitrogen 10      Creatinine 0.60      eGFR >90      Calcium 9.0      Albumin 3.7      Alkaline Phosphatase 79      Total Protein 6.5      AST 16      Bilirubin, Total 1.1      ALT 13     MAGNESIUM - Normal    Magnesium 2.07     PROTIME-INR - Normal    Protime 11.8      INR 1.1     TROPONIN I, HIGH SENSITIVITY - Normal    Troponin I, High Sensitivity 8      Narrative:     Less than 99th percentile of normal range cutoff-  Female and children under 18 years old <14 ng/L; Male <21 ng/L: Negative  Repeat testing should be performed if clinically  indicated.     Female and children under 18 years old 14-50 ng/L; Male 21-50 ng/L:  Consistent with possible cardiac damage and possible increased clinical   risk. Serial measurements may help to assess extent of myocardial damage.     >50 ng/L: Consistent with cardiac damage, increased clinical risk and  myocardial infarction. Serial measurements may help assess extent of   myocardial damage.      NOTE: Children less than 1 year old may have higher baseline troponin   levels and results should be interpreted in conjunction with the overall   clinical context.     NOTE: Troponin I testing is performed using a different   testing methodology at [facility] than at other   [facility]. Direct result comparisons should only   be made within the same method.       CT angio chest for pulmonary embolism   Final Result   1.  No evidence of a pulmonary embolus.   2.  Right lower lobe infiltrate, suspicious for pneumonia.     3.  Small loculated right pleural effusion.   4.  Coronary artery calcifications.   5.  Evidence of a gastric pull-through after esophagectomy.   Signed by MD Logan King MD      Procedure  Procedures         [1]   Past Medical History:  Diagnosis Date    Abdominal wall pain     Abnormal EKG     Anxiety     Bilateral kidney stones     Crohn's disease (Multi)     Dysphagia     GERD (gastroesophageal reflux disease)     History of blood transfusion     History of esophageal cancer     History of goiter     History of mammogram 07/05/2024    cat 1    Hx of chronic pancreatitis     Pulmonary embolism     History Of    Seasonal allergies     Thyrotoxicosis     Wears reading eyeglasses    [2]   Past Surgical History:  Procedure Laterality Date    APPENDECTOMY  1981    CARDIAC CATHETERIZATION Left 6/16/2025    Procedure: LHC, With LV;  Surgeon: Tabatha Garsia MD;  Location: [facility];  Service: Cardiovascular;  Laterality: Left;    CHOLECYSTECTOMY       COLONOSCOPY  2019    early adenomatous glandular changes-due --declines-unable to tolerate prep    CYSTOSCOPY      ESOPHAGUS SURGERY      HYSTERECTOMY  1987    STEVO USO    LITHOTRIPSY      PANCREAS SURGERY  2019    30% of pancreas removed    SHOULDER SURGERY Right 1977    1 pin    SKIN GRAFT Left     Left Foot    SPLENECTOMY, TOTAL      THYROIDECTOMY, PARTIAL     [3]   Family History  Problem Relation Name Age of Onset    Leukemia Mother      Other (motor vehicle accident) Father     [4]   Social History  Tobacco Use    Smoking status: Former     Current packs/day: 0.00     Types: Cigarettes     Quit date:      Years since quittin.4     Passive exposure: Never    Smokeless tobacco: Never   Vaping Use    Vaping status: Never Used   Substance Use Topics    Alcohol use: Not Currently     Comment: Social    Drug use: Never        Mayra Escalona, [place]-CNP  [phone]

## 2025-06-25 LAB
ANION GAP SERPL CALC-SCNC: 9 MMOL/L (ref 10–20)
BUN SERPL-MCNC: 12 MG/DL (ref 6–23)
CALCIUM SERPL-MCNC: 8.8 MG/DL (ref 8.6–10.3)
CHLORIDE SERPL-SCNC: 103 MMOL/L (ref 98–107)
CO2 SERPL-SCNC: 29 MMOL/L (ref 21–32)
CREAT SERPL-MCNC: 0.72 MG/DL (ref 0.5–1.05)
EGFRCR SERPLBLD CKD-EPI 2021: 88 ML/MIN/1.73M*2
ERYTHROCYTE [DISTWIDTH] IN BLOOD BY AUTOMATED COUNT: 14.4 % (ref 11.5–14.5)
GLUCOSE SERPL-MCNC: 161 MG/DL (ref 74–99)
HCT VFR BLD AUTO: 38.4 % (ref 36–46)
HGB BLD-MCNC: 12.6 G/DL (ref 12–16)
HOLD SPECIMEN: NORMAL
LDH SERPL L TO P-CCNC: 176 U/L (ref 84–246)
MCH RBC QN AUTO: 33.3 PG (ref 26–34)
MCHC RBC AUTO-ENTMCNC: 32.8 G/DL (ref 32–36)
MCV RBC AUTO: 102 FL (ref 80–100)
NRBC BLD-RTO: 0 /100 WBCS (ref 0–0)
PLATELET # BLD AUTO: 186 X10*3/UL (ref 150–450)
POTASSIUM SERPL-SCNC: 4.9 MMOL/L (ref 3.5–5.3)
PROCALCITONIN SERPL-MCNC: 0.36 NG/ML
PROT SERPL-MCNC: 6 G/DL (ref 6.4–8.2)
RBC # BLD AUTO: 3.78 X10*6/UL (ref 4–5.2)
SODIUM SERPL-SCNC: 136 MMOL/L (ref 136–145)
WBC # BLD AUTO: 13.1 X10*3/UL (ref 4.4–11.3)

## 2025-06-25 PROCEDURE — 2500000001 HC RX 250 WO HCPCS SELF ADMINISTERED DRUGS (ALT 637 FOR MEDICARE OP): Performed by: INTERNAL MEDICINE

## 2025-06-25 PROCEDURE — 84145 PROCALCITONIN (PCT): CPT | Mod: ELYLAB | Performed by: INTERNAL MEDICINE

## 2025-06-25 PROCEDURE — 87081 CULTURE SCREEN ONLY: CPT | Mod: ELYLAB | Performed by: INTERNAL MEDICINE

## 2025-06-25 PROCEDURE — 2500000004 HC RX 250 GENERAL PHARMACY W/ HCPCS (ALT 636 FOR OP/ED)

## 2025-06-25 PROCEDURE — 99233 SBSQ HOSP IP/OBS HIGH 50: CPT | Performed by: INTERNAL MEDICINE

## 2025-06-25 PROCEDURE — 36415 COLL VENOUS BLD VENIPUNCTURE: CPT | Performed by: INTERNAL MEDICINE

## 2025-06-25 PROCEDURE — 1200000002 HC GENERAL ROOM WITH TELEMETRY DAILY

## 2025-06-25 PROCEDURE — 2500000004 HC RX 250 GENERAL PHARMACY W/ HCPCS (ALT 636 FOR OP/ED): Performed by: INTERNAL MEDICINE

## 2025-06-25 PROCEDURE — 83615 LACTATE (LD) (LDH) ENZYME: CPT | Performed by: INTERNAL MEDICINE

## 2025-06-25 PROCEDURE — 84155 ASSAY OF PROTEIN SERUM: CPT | Performed by: INTERNAL MEDICINE

## 2025-06-25 PROCEDURE — 2500000002 HC RX 250 W HCPCS SELF ADMINISTERED DRUGS (ALT 637 FOR MEDICARE OP, ALT 636 FOR OP/ED): Performed by: INTERNAL MEDICINE

## 2025-06-25 PROCEDURE — 87899 AGENT NOS ASSAY W/OPTIC: CPT | Mod: ELYLAB | Performed by: INTERNAL MEDICINE

## 2025-06-25 PROCEDURE — 85027 COMPLETE CBC AUTOMATED: CPT | Performed by: INTERNAL MEDICINE

## 2025-06-25 PROCEDURE — 80048 BASIC METABOLIC PNL TOTAL CA: CPT | Performed by: INTERNAL MEDICINE

## 2025-06-25 PROCEDURE — 87449 NOS EACH ORGANISM AG IA: CPT | Mod: ELYLAB | Performed by: INTERNAL MEDICINE

## 2025-06-25 RX ORDER — TRAMADOL HYDROCHLORIDE 50 MG/1
50 TABLET, FILM COATED ORAL EVERY 8 HOURS PRN
Status: DISCONTINUED | OUTPATIENT
Start: 2025-06-25 | End: 2025-06-26 | Stop reason: HOSPADM

## 2025-06-25 RX ORDER — OXYCODONE HYDROCHLORIDE 5 MG/1
5 TABLET ORAL ONCE
Refills: 0 | Status: COMPLETED | OUTPATIENT
Start: 2025-06-25 | End: 2025-06-26

## 2025-06-25 RX ADMIN — PANTOPRAZOLE SODIUM 40 MG: 40 TABLET, DELAYED RELEASE ORAL at 05:13

## 2025-06-25 RX ADMIN — ENOXAPARIN SODIUM 40 MG: 40 INJECTION SUBCUTANEOUS at 20:42

## 2025-06-25 RX ADMIN — ACETAMINOPHEN 650 MG: 325 TABLET ORAL at 08:10

## 2025-06-25 RX ADMIN — ALPRAZOLAM 0.25 MG: 0.25 TABLET ORAL at 20:42

## 2025-06-25 RX ADMIN — IBUPROFEN 400 MG: 400 TABLET, FILM COATED ORAL at 09:30

## 2025-06-25 RX ADMIN — ASPIRIN 81 MG: 81 TABLET, COATED ORAL at 08:10

## 2025-06-25 RX ADMIN — PIPERACILLIN SODIUM AND TAZOBACTAM SODIUM 3.38 G: 3; .375 INJECTION, SOLUTION INTRAVENOUS at 13:14

## 2025-06-25 RX ADMIN — PIPERACILLIN SODIUM AND TAZOBACTAM SODIUM 3.38 G: 3; .375 INJECTION, SOLUTION INTRAVENOUS at 05:13

## 2025-06-25 RX ADMIN — GABAPENTIN 200 MG: 100 CAPSULE ORAL at 08:10

## 2025-06-25 RX ADMIN — VALACYCLOVIR HYDROCHLORIDE 500 MG: 500 TABLET, FILM COATED ORAL at 20:42

## 2025-06-25 RX ADMIN — ACETAMINOPHEN 650 MG: 325 TABLET ORAL at 20:41

## 2025-06-25 RX ADMIN — VALACYCLOVIR HYDROCHLORIDE 500 MG: 500 TABLET, FILM COATED ORAL at 08:10

## 2025-06-25 RX ADMIN — PIPERACILLIN SODIUM AND TAZOBACTAM SODIUM 3.38 G: 3; .375 INJECTION, SOLUTION INTRAVENOUS at 20:49

## 2025-06-25 SDOH — SOCIAL STABILITY: SOCIAL INSECURITY
WITHIN THE LAST YEAR, HAVE YOU BEEN KICKED, HIT, SLAPPED, OR OTHERWISE PHYSICALLY HURT BY YOUR PARTNER OR EX-PARTNER?: NO

## 2025-06-25 SDOH — ECONOMIC STABILITY: HOUSING INSECURITY: AT ANY TIME IN THE PAST 12 MONTHS, WERE YOU HOMELESS OR LIVING IN A SHELTER (INCLUDING NOW)?: NO

## 2025-06-25 SDOH — SOCIAL STABILITY: SOCIAL INSECURITY: WITHIN THE LAST YEAR, HAVE YOU BEEN HUMILIATED OR EMOTIONALLY ABUSED IN OTHER WAYS BY YOUR PARTNER OR EX-PARTNER?: NO

## 2025-06-25 SDOH — SOCIAL STABILITY: SOCIAL INSECURITY: WITHIN THE LAST YEAR, HAVE YOU BEEN AFRAID OF YOUR PARTNER OR EX-PARTNER?: NO

## 2025-06-25 SDOH — ECONOMIC STABILITY: FOOD INSECURITY: WITHIN THE PAST 12 MONTHS, YOU WORRIED THAT YOUR FOOD WOULD RUN OUT BEFORE YOU GOT THE MONEY TO BUY MORE.: NEVER TRUE

## 2025-06-25 SDOH — SOCIAL STABILITY: SOCIAL INSECURITY
WITHIN THE LAST YEAR, HAVE YOU BEEN RAPED OR FORCED TO HAVE ANY KIND OF SEXUAL ACTIVITY BY YOUR PARTNER OR EX-PARTNER?: NO

## 2025-06-25 SDOH — ECONOMIC STABILITY: HOUSING INSECURITY: IN THE LAST 12 MONTHS, WAS THERE A TIME WHEN YOU WERE NOT ABLE TO PAY THE MORTGAGE OR RENT ON TIME?: NO

## 2025-06-25 SDOH — ECONOMIC STABILITY: FOOD INSECURITY: HOW HARD IS IT FOR YOU TO PAY FOR THE VERY BASICS LIKE FOOD, HOUSING, MEDICAL CARE, AND HEATING?: NOT HARD AT ALL

## 2025-06-25 SDOH — ECONOMIC STABILITY: FOOD INSECURITY: WITHIN THE PAST 12 MONTHS, THE FOOD YOU BOUGHT JUST DIDN'T LAST AND YOU DIDN'T HAVE MONEY TO GET MORE.: NEVER TRUE

## 2025-06-25 SDOH — ECONOMIC STABILITY: HOUSING INSECURITY: IN THE PAST 12 MONTHS, HOW MANY TIMES HAVE YOU MOVED WHERE YOU WERE LIVING?: 0

## 2025-06-25 SDOH — ECONOMIC STABILITY: INCOME INSECURITY: IN THE PAST 12 MONTHS HAS THE ELECTRIC, GAS, OIL, OR WATER COMPANY THREATENED TO SHUT OFF SERVICES IN YOUR HOME?: NO

## 2025-06-25 SDOH — ECONOMIC STABILITY: TRANSPORTATION INSECURITY: IN THE PAST 12 MONTHS, HAS LACK OF TRANSPORTATION KEPT YOU FROM MEDICAL APPOINTMENTS OR FROM GETTING MEDICATIONS?: NO

## 2025-06-25 ASSESSMENT — PAIN - FUNCTIONAL ASSESSMENT
PAIN_FUNCTIONAL_ASSESSMENT: 0-10

## 2025-06-25 ASSESSMENT — PAIN DESCRIPTION - LOCATION
LOCATION: BREAST
LOCATION: RIB CAGE
LOCATION: RIB CAGE

## 2025-06-25 ASSESSMENT — ACTIVITIES OF DAILY LIVING (ADL)
DRESSING YOURSELF: INDEPENDENT
TOILETING: INDEPENDENT
WALKS IN HOME: INDEPENDENT
LACK_OF_TRANSPORTATION: NO
PATIENT'S MEMORY ADEQUATE TO SAFELY COMPLETE DAILY ACTIVITIES?: YES
HEARING - LEFT EAR: FUNCTIONAL
GROOMING: INDEPENDENT
BATHING: INDEPENDENT
HEARING - RIGHT EAR: FUNCTIONAL
ADEQUATE_TO_COMPLETE_ADL: YES
FEEDING YOURSELF: INDEPENDENT
JUDGMENT_ADEQUATE_SAFELY_COMPLETE_DAILY_ACTIVITIES: YES

## 2025-06-25 ASSESSMENT — COGNITIVE AND FUNCTIONAL STATUS - GENERAL
DAILY ACTIVITIY SCORE: 24
MOBILITY SCORE: 24

## 2025-06-25 ASSESSMENT — PAIN SCALES - GENERAL
PAINLEVEL_OUTOF10: 0 - NO PAIN
PAINLEVEL_OUTOF10: 8
PAINLEVEL_OUTOF10: 4
PAINLEVEL_OUTOF10: 7
PAINLEVEL_OUTOF10: 5 - MODERATE PAIN
PAINLEVEL_OUTOF10: 5 - MODERATE PAIN

## 2025-06-25 ASSESSMENT — PAIN DESCRIPTION - DESCRIPTORS
DESCRIPTORS: THROBBING
DESCRIPTORS: ACHING
DESCRIPTORS: ACHING

## 2025-06-25 ASSESSMENT — PAIN DESCRIPTION - ORIENTATION
ORIENTATION: RIGHT

## 2025-06-25 NOTE — PROGRESS NOTES
Sarah Chavez is a 74 y.o. female on day 1 of admission presenting with Pneumonia of right lower lobe due to infectious organism.      Subjective   The patient seen and evaluated today.  She has persistent right-sided pleuritic chest pain.  She states is worse when she coughs or has hiccups.  Denies any severe cough at this time denies any abdominal pain chest pain nausea vomiting at this time.  Denies any fever or chills.       Objective     Last Recorded Vitals  /56 (BP Location: Right arm, Patient Position: Lying)   Pulse 63   Temp 36.5 °C (97.7 °F) (Temporal)   Resp 16   Wt 54 kg (119 lb)   SpO2 96%   Intake/Output last 3 Shifts:    Intake/Output Summary (Last 24 hours) at 6/25/2025 1041  Last data filed at 6/25/2025 0900  Gross per 24 hour   Intake 611 ml   Output --   Net 611 ml       Admission Weight  Weight: 54 kg (119 lb) (06/24/25 1024)    Daily Weight  06/24/25 : 54 kg (119 lb)    Image Results  CT angio chest for pulmonary embolism  Narrative: STUDY:  CT Angiogram of the Chest; 6/24/2025 11:37 AM  INDICATION:  Right-sided chest pain with shortness of breath.  Evaluate for  pulmonary embolism.  COMPARISON:  CXR 6/18/2025.  CT CAP 1/27/2025.  ACCESSION NUMBER(S):  [id number]  ORDERING CLINICIAN:  OZZIE PARISI  TECHNIQUE:  CTA of the chest was performed with intravenous contrast.   Images are reviewed and processed at a workstation according to the CT  angiogram protocol with 3-D and/or MIP post processing imaging  generated.  Omnipaque 350 75 mL was administered intravenously.    Automated mA/kV exposure control was utilized and patient examination  was performed in strict accordance with principles of ALARA.  FINDINGS:  No filling defects are seen in the main pulmonary artery, left or  right pulmonary arteries, or first order branches to indicate an  embolus.  No early filling veins are visualized indicate an  arteriovenous malformation.  There is a right lower lobe  infiltrate,  suspicious for pneumonia.  There is a small loculated right pleural  effusion.  No pneumothorax is noted.  No filling defects are seen  within the trachea or mainstem bronchi.  No enlarged lymph nodes are  seen in the mediastinal or hilar regions.  There is no pericardial  effusion.  Coronary artery calcifications are present.  There appears  to be evidence of a pull-through esophagectomy.  There is no evidence  of aneurysmal dilatation of the ascending aorta, aortic arch, or  descending thoracic aorta.  Degenerative changes are seen within the  thoracic spine.  This report was called to Dr. Mayra Escalona at 12:15 PM on June 24, 2025.  Impression: 1.  No evidence of a pulmonary embolus.  2.  Right lower lobe infiltrate, suspicious for pneumonia.    3.  Small loculated right pleural effusion.  4.  Coronary artery calcifications.  5.  Evidence of a gastric pull-through after esophagectomy.  Signed by Maulik Malik MD  ECG 12 lead  Unusual P axis, possible ectopic atrial rhythm  Incomplete right bundle branch block  Nonspecific T wave abnormality  Abnormal ECG  When compared with ECG of 16-JUN-2025 10:14,  Nonspecific T wave abnormality now evident in Lateral leads      Physical Exam  Constitutional:       Appearance: Normal appearance.   HENT:      Head: Normocephalic and atraumatic.      Nose: Nose normal.      Mouth/Throat:      Mouth: Mucous membranes are dry.   Eyes:      Extraocular Movements: Extraocular movements intact.      Conjunctiva/sclera: Conjunctivae normal.   Cardiovascular:      Rate and Rhythm: Normal rate and regular rhythm.      Pulses: Normal pulses.      Heart sounds: Normal heart sounds.   Pulmonary:      Effort: Pulmonary effort is normal.      Breath sounds: Normal breath sounds.   Chest:      Chest wall: Tenderness present.   Abdominal:      General: Bowel sounds are normal.      Palpations: Abdomen is soft.   Musculoskeletal:         General: Normal range of motion.       Cervical back: Normal range of motion and neck supple.   Skin:     General: Skin is warm and dry.   Neurological:      General: No focal deficit present.      Mental Status: She is alert and oriented to person, place, and time. Mental status is at baseline.   Psychiatric:         Mood and Affect: Mood normal.         Relevant Results                      Sarah Chavez is a 74 y.o. female with past medical history Esophageal Cancer s/p Esophagectomy + jejunostomy & FOLFOX + 1 year neoadjuvant Nivolumab (2022), Anxiety, Tobacco Abuse, prior PE (completed 6 months of AC, HLD comes to the hospital due to right-sided pleuritic chest pain.         Assessment & Plan  Pneumonia of right lower lobe due to infectious organism    Right lower lobe pneumonia  Pleuritic chest pain  - Patient with multiple visits and has already completed a course of doxycycline in outpatient settings for right lower lobe pneumonia.  - CT PE study reveals no evidence of PE but reveals right lower lobe infiltrates suspicious for pneumonia and small loculated right pleural effusion.  - ED discussed with IR for IR effusions are not large enough for aspiration/biopsy.  Will consult IR for further evaluation as the patient has persistent severe pleuritic pain and is afebrile without any leukocytosis.  - Continue with Zosyn.  Vancomycin was discontinued due to development of some rash.  - Troponins are normal recent left heart cath reveals normal coronaries.  - Consult pulmonology.  Appreciate recommendations.       History of esophageal cancer  - Per the patient she completed treatment in 2022 and per the patient it has been in remission.  She does get surveillance CT scan every 6 months.  - Continue with outpatient follow-up     Anxiety and depression  Rheumatoid arthritis  - Continue with home medications     VTE prophylaxis-Lovenox           Scott Mack MD

## 2025-06-25 NOTE — CARE PLAN
The patient's goals for the shift include pt will recover from the infection    The clinical goals for the shift include Improved Respiratory function and oxygenation

## 2025-06-25 NOTE — PROGRESS NOTES
06/25/25 0817   Discharge Planning   Living Arrangements Spouse/significant other   Support Systems Spouse/significant other   Assistance Needed Independent   Type of Residence Private residence   Number of Stairs to Enter Residence 13   Number of Stairs Within Residence 3   Do you have animals or pets at home? Yes   Type of Animals or Pets cat   Who is requesting discharge planning? Provider   Home or Post Acute Services None   Expected Discharge Disposition Home   Does the patient need discharge transport arranged? No   Patient Choice   Provider Choice list and CMS website (https://medicare.gov/care-compare#search) for post-acute Quality and Resource Measure Data were provided and reviewed with: Patient   Patient / Family choosing to utilize agency / facility established prior to hospitalization No   Stroke Family Assessment   Stroke Family Assessment Needed No   Intensity of Service   Intensity of Service 0-30 min     Patient admitted from home with recurrent pneumonia. Per patient she had just been here during Memorial Day weekend for pneumonia, went home, followed up with her PCP Dr. Sima Miner, who added an additional 10 days of antibiotics to her regimen. Per patient she was doing well up until this past Monday when she started feeling short of breath again while she was in her pool. Patient had CT scheduled as she has them every 6 months since her esophageal cancer in 2022. Patient had surgery, chemotherapy and radiation for her cancer. Patient currently on 3 liters oxygen via n/c, does not wear home oxygen. Patient is independent, lives with spouse, has [place] with multiple steps which she has been tolerating well. Plan at this time will be home, no needs identified at this time unless a longer period of IV antibiotics would be warranted. CT team will continue to follow.

## 2025-06-25 NOTE — CONSULTS
Reason For Consult  Pulm Eval    History Of Present Illness  Sarah Chavze is a 74 y.o. female with past medical history of rheumatoid arthritis, hyperlipidemia, coronary artery disease, history of esophageal cancer adenocarcinoma with treatment team 2022 comes to the hospital due to right-sided pleuritic chest pain.  The patient states she has completed the course of doxycycline in outpatient settings but she states she has recurrence of right-sided pleuritic chest pain.  The patient states the pain is severe and is worse when she is coughing.  She currently denies any sputum production and is on room air.  Denies any fever chills nausea vomiting abdominal pain.  Patient recently had left heart catheterization that revealed normal coronary arteries.  CT PE study done in ED revealed no pulmonary embolism but revealed right lower lobe infiltrates and small loculated pleural effusion.  ED did discuss with interventional radiology possible aspiration but per ED radiology does not think that the loculated pleural effusion is significant enough for aspiration.  Patient is admitted for further care and management.   I was asked to evaluate and follow from a pulmonary perspective.  Patient is a 74-year-old  female with 40-pack-year plus prior smoking cigarette history and suspected COPD, history of esophageal cancer with surgery rheumatoid arthritis, CAD and multiple other medical problems.  Patient presents with several day history of right-sided pleuritic chest pain.  She was treated as a case of pleurisy as an outpatient with doxycycline without much improvement.  Pain is severe worse with coughing.  Denies sputum production.  Denies fever chills night sweats or weight loss.  Patient had a recent left heart cath showing near normal coronary arteries.  CT chest with PE protocol in the ER showed no evidence of PE and right lower lobe infiltrate with a small loculated pleural effusion.  Past Medical  History  [Medical History]    [Medical History]  Past Medical History      Diagnosis Date   Abdominal wall pain     Abnormal EKG     Anxiety     Bilateral kidney stones     Crohn's disease (Multi)     Dysphagia     GERD (gastroesophageal reflux disease)     History of blood transfusion     History of esophageal cancer     History of goiter     History of mammogram 07/05/2024    cat 1   Hx of chronic pancreatitis     Pulmonary embolism      History Of   Seasonal allergies     Thyrotoxicosis     Wears reading eyeglasses         Surgical History  [Surgical History]    [Surgical History]  Past Surgical History       Procedure Laterality Date   APPENDECTOMY   1981   CARDIAC CATHETERIZATION Left 6/16/2025    Procedure: LHC, With LV;  Surgeon: Tabatha Garsia MD;  Location: [facility];  Service: Cardiovascular;  Laterality: Left;   CHOLECYSTECTOMY   1994   COLONOSCOPY   09/2019    early adenomatous glandular changes-due 2024--declines-unable to tolerate prep   CYSTOSCOPY       ESOPHAGUS SURGERY       HYSTERECTOMY   1987    STEVO USO   LITHOTRIPSY       PANCREAS SURGERY   2019    30% of pancreas removed   SHOULDER SURGERY Right 1977    1 pin   SKIN GRAFT Left      Left Foot   SPLENECTOMY, TOTAL       THYROIDECTOMY, PARTIAL           Social History  She reports that she quit smoking about 12 years ago. Her smoking use included cigarettes. She has never been exposed to tobacco smoke. She has never used smokeless tobacco. She reports that she does not currently use alcohol. She reports that she does not use drugs.     Family History  [Family History]    [Family History]        Problem Relation Name Age of Onset   Leukemia Mother       Other (motor vehicle accident) Father           Allergies  Atorvastatin, Rosuvastatin, and Statins-hmg-coa reductase inhibitors     Review of Systems   Constitutional: Negative.    HENT: Negative.     Eyes: Negative.    Respiratory:  Positive for chest tightness and shortness of breath.   "  Cardiovascular: Negative.    Gastrointestinal: Negative.    Endocrine: Negative.    Musculoskeletal: Negative.    Skin: Negative.    Allergic/Immunologic: Negative.    Psychiatric/Behavioral: Negative.     All other systems reviewed and are negative.        Physical Exam  Constitutional:       Appearance: Normal appearance.   HENT:      Head: Normocephalic and atraumatic.      Nose: Nose normal.      Mouth/Throat:      Mouth: Mucous membranes are dry.   Eyes:      Extraocular Movements: Extraocular movements intact.      Conjunctiva/sclera: Conjunctivae normal.   Cardiovascular:      Rate and Rhythm: Normal rate and regular rhythm.      Pulses: Normal pulses.      Heart sounds: Normal heart sounds.   Pulmonary:      Effort: Pulmonary effort is normal.      Breath sounds: Normal breath sounds.   Chest:      Chest wall: Tenderness present.   Abdominal:      General: Bowel sounds are normal.      Palpations: Abdomen is soft.   Musculoskeletal:         General: Normal range of motion.      Cervical back: Normal range of motion and neck supple.   Skin:     General: Skin is warm and dry.   Neurological:      General: No focal deficit present.      Mental Status: She is alert and oriented to person, place, and time. Mental status is at baseline.   Psychiatric:         Mood and Affect: Mood normal.            Last Recorded Vitals  Blood pressure 115/66, pulse 66, temperature 36.4 °C (97.5 °F), resp. rate 20, height 1.575 m (5' 2\"), weight 54 kg (119 lb), last menstrual period 01/01/1987, SpO2 (!) 92%.     Relevant Results           Sarah Chavez is a 74 y.o. female with past medical history Esophageal Cancer s/p Esophagectomy + jejunostomy & FOLFOX + 1 year neoadjuvant Nivolumab (2022), Anxiety, Tobacco Abuse, prior PE (completed 6 months of AC, HLD comes to the hospital due to right-sided pleuritic chest pain.      Assessment & Plan    Pneumonia of right lower lobe due to infectious organism        Right lower lobe " pneumonia  Pleuritic chest pain  - Patient with multiple visits and has already completed a course of doxycycline in outpatient settings for right lower lobe pneumonia.  - CT PE study reveals no evidence of PE but reveals right lower lobe infiltrates suspicious for pneumonia and small loculated right pleural effusion.  - ED discussed with IR for IR effusions are not large enough for aspiration/biopsy  - Continue with vancomycin and Zosyn  - Patient remains on room air.  Troponins are normal recent left heart cath reveals normal coronaries.  - The patient was admitted to different hospital recently with similar presentation.     History of esophageal cancer  - Per the patient she completed treatment in 2022 and per the patient it has been in remission.  She does get surveillance CT scan every 6 months.  - Continue with outpatient follow-up     Anxiety and depression  Rheumatoid arthritis  - Continue with home medications     VTE prophylaxis-Lovenox     Pulmonary comments:-Agree with the treatment of community-acquired pneumonia in this immunocompromised range.  Individual with IV Zosyn as ordered.  Patient apparently developed a rash in the ER while infusing vancomycin and it is unclear if this represents a true allergy.  Patient remains on room air.  Agree with supportive and symptomatic treatment of pleurisy.  I shall continue to monitor this patient with you and I thank you for the referral.  Plan of care discussed with patient.     I spent 50 minutes in the professional and overall care of this patient.    Heber Gill MD

## 2025-06-25 NOTE — CONSULTS
"Nutrition Initial Assessment:   Nutrition Assessment    Reason for Assessment: Admission nursing screening    Patient is a 74 y.o. female presenting with pneumonia of right lower lobe  past medical history of rheumatoid arthritis, hyperlipidemia, coronary artery disease, history of esophageal cancer adenocarcinoma with treatment team 2022 comes to the hospital due to right-sided pleuritic chest pain.       Nutrition History:  Energy Intake:  (no meal intakes recorded at this time)  Pain affecting nutrition status: N/A  Food and Nutrient History: RDN consult for MST score of 2. Pt reports wt loss since end of May when she was in the hospital. Pt states wt at that time was 125 lb, wt this admission 119 lb. Pt reports her appetite has not been the same since cancer treatment in 2022 - reports ongoing taste changes. Pt reports drinking Ensure most days of the week, agreeable to Ensure Clear BID this admission. Pt denies following diet restrictions at home. Pt denies GI symptoms, denies chewing or swallowing difficulty. Will continue to monitor.  Vitamin/Herbal Supplement Use: apple cider vinager, vitamin C, b complex, D3, iron, magnesium, melatonin, fish oil, potassium gluconate, tuneric root extract per home med list       Anthropometrics:  Height: 157.5 cm (5' 2\")   Weight: 54 kg (119 lb)   BMI (Calculated): 21.76  IBW/kg (Dietitian Calculated): 50 kg  Percent of IBW: 108 %                      Weight History:   Wt Readings from Last 10 Encounters:   06/24/25 54 kg (119 lb)   06/18/25 54.4 kg (120 lb)   06/16/25 54.3 kg (119 lb 11.4 oz)   06/09/25 54.3 kg (119 lb 9.6 oz)   06/03/25 57.2 kg (126 lb)   04/07/25 57.9 kg (127 lb 9.6 oz)   03/25/25 57.2 kg (126 lb)   01/30/25 55.6 kg (122 lb 9.2 oz)   12/20/24 54.9 kg (121 lb)   11/14/24 54.5 kg (120 lb 2.4 oz)      Weight Change %:  Weight History / % Weight Change: pt with 7 lb, 5.6% nonsignificant wt loss in 3 months  Significant Weight Loss: No    Nutrition Focused " Physical Exam Findings:    Subcutaneous Fat Loss:   Orbital Fat Pads: Mild-Moderate (slight dark circles and slight hollowing)  Buccal Fat Pads: Mild-Moderate (flat cheeks, minimal bounce)  Triceps: Well nourished (ample fat tissue)  Muscle Wasting:  Temporalis: Mild-Moderate (slight depression)  Pectoralis (Clavicular Region): Mild-Moderate (some protrusion of clavicle)  Deltoid/Trapezius: Mild-Moderate (slight protrusion of acromion process)  Edema:  Edema: none  Physical Findings:  Skin: Negative    Nutrition Significant Labs:  BMP Trend:   Results from last 7 days   Lab Units 06/25/25  0545 06/24/25  1110   GLUCOSE mg/dL 161* 128*   CALCIUM mg/dL 8.8 9.0   SODIUM mmol/L 136 139   POTASSIUM mmol/L 4.9 4.1   CO2 mmol/L 29 26   CHLORIDE mmol/L 103 105   BUN mg/dL 12 10   CREATININE mg/dL 0.72 0.60    , A1C:  Lab Results   Component Value Date    HGBA1C 5.3 06/13/2024   , BG POCT trend:        Nutrition Specific Medications:  Scheduled medications  Scheduled Medications[1]  Continuous medications  Continuous Medications[2]  PRN medications  PRN Medications[3]     I/O:   Last BM Date: 06/24/25;      Dietary Orders (From admission, onward)       Start     Ordered    06/24/25 1946  May Participate in Room Service  ( ROOM SERVICE MAY PARTICIPATE)  Once        Question:  .  Answer:  Yes    06/24/25 1945 06/24/25 1923  Adult diet Regular  Diet effective now        Question:  Diet type  Answer:  Regular    06/24/25 1922                     Estimated Needs:   Total Energy Estimated Needs in 24 hours (kCal): 1620 kCal  Method for Estimating Needs: 30 kcal/kg ABW  Total Protein Estimated Needs in 24 Hours (g): 65 g  Method for Estimating 24 Hour Protein Needs: 1.2 g/kg ABW  Total Fluid Estimated Needs in 24 Hours (mL): 1620 mL  Method for Estimating 24 Hour Fluid Needs: 1 ml/kcal or per MD  Patient on Order Fluid Restriction: No        Nutrition Diagnosis   Malnutrition Diagnosis  Patient has Malnutrition Diagnosis:  Yes  Diagnosis Status: New  Malnutrition Diagnosis: Moderate malnutrition related to chronic disease or condition  Related to: hx esophageal cancer with ongoing treatment side effects  As Evidenced by: mild/moderate muscle wasting, mild/moderate fat loss, <75% of estimated energy requirements for >1 month            Nutrition Interventions/Recommendations   Nutrition prescription for oral nutrition    Nutrition Recommendations:  Individualized Nutrition Prescription Provided for : Continue Regular diet with ONS    Nutrition Interventions/Goals:   Meals and Snacks: General healthful diet  Goal: Consumes 3 meals per day  Medical Food Supplement: Commercial beverage medical food supplement therapy  Goal: Ensure Clear BID (provides 240 kcal, 8 g protein per serving).      Education Documentation  No documentation found.    Pt denies needs at this time        Nutrition Monitoring and Evaluation   Estimated Energy Intake: Energy intake greater or equal to 75% of estimated energy needs  Intake / Amount of food: Consumes at least 75% or more of meals/snacks/supplements, Meets > 75% estimated energy needs    Body Weight: Body weight - Maintain stable weight    Electrolyte and Renal Panel: Electrolytes within normal limits  Glucose/Endocrine Profile: Glucose within normal limits ( mg/dL)              Time Spent (min): 40 minutes            [1] ALPRAZolam, 0.25 mg, oral, Nightly  aspirin, 81 mg, oral, Daily  enoxaparin, 40 mg, subcutaneous, q24h  gabapentin, 200 mg, oral, Daily  pantoprazole, 40 mg, oral, Daily before breakfast  piperacillin-tazobactam, 3.375 g, intravenous, q8h  polyethylene glycol, 17 g, oral, Daily  valACYclovir, 500 mg, oral, BID  [2]    [3] PRN medications: acetaminophen **OR** acetaminophen **OR** acetaminophen, albuterol, bisacodyl, ibuprofen

## 2025-06-25 NOTE — NURSING NOTE
Dr. Brambila was sent info to review order for thoracentesis that Dr. Mack ordered.  Dr. Brambila states that he spoke with Mayra Escalona CNP in ED, yesterday as she was asking if a thoracentesis could be done.  He informed her that the pleural fluid is loculated and too small to do a thoracentesis and to collect samples.  Dr. Mack was made aware of this as well.  Thoracentesis will not be done.

## 2025-06-25 NOTE — CARE PLAN
The patient's goals for the shift include pt will recover from the infection    The clinical goals for the shift include Maintain SPO2>92%    Over the shift, the patient did make progress toward the following goals.  Problem: Pain - Adult  Goal: Verbalizes/displays adequate comfort level or baseline comfort level  Outcome: Progressing     Problem: Safety - Adult  Goal: Free from fall injury  Outcome: Progressing     Problem: Discharge Planning  Goal: Discharge to home or other facility with appropriate resources  Outcome: Progressing     Problem: Chronic Conditions and Co-morbidities  Goal: Patient's chronic conditions and co-morbidity symptoms are monitored and maintained or improved  Outcome: Progressing     Problem: Nutrition  Goal: Nutrient intake appropriate for maintaining nutritional needs  Outcome: Progressing

## 2025-06-26 VITALS
WEIGHT: 119 LBS | HEIGHT: 62 IN | TEMPERATURE: 97.2 F | DIASTOLIC BLOOD PRESSURE: 67 MMHG | RESPIRATION RATE: 16 BRPM | HEART RATE: 68 BPM | BODY MASS INDEX: 21.9 KG/M2 | OXYGEN SATURATION: 94 % | SYSTOLIC BLOOD PRESSURE: 120 MMHG

## 2025-06-26 DIAGNOSIS — J90 PLEURAL EFFUSION: Primary | ICD-10-CM

## 2025-06-26 LAB
ANION GAP SERPL CALC-SCNC: 11 MMOL/L (ref 10–20)
BASOPHILS # BLD AUTO: 0.04 X10*3/UL (ref 0–0.1)
BASOPHILS NFR BLD AUTO: 0.5 %
BUN SERPL-MCNC: 14 MG/DL (ref 6–23)
CALCIUM SERPL-MCNC: 8.5 MG/DL (ref 8.6–10.3)
CHLORIDE SERPL-SCNC: 106 MMOL/L (ref 98–107)
CO2 SERPL-SCNC: 26 MMOL/L (ref 21–32)
CREAT SERPL-MCNC: 0.79 MG/DL (ref 0.5–1.05)
EGFRCR SERPLBLD CKD-EPI 2021: 79 ML/MIN/1.73M*2
EOSINOPHIL # BLD AUTO: 0.15 X10*3/UL (ref 0–0.4)
EOSINOPHIL NFR BLD AUTO: 1.7 %
ERYTHROCYTE [DISTWIDTH] IN BLOOD BY AUTOMATED COUNT: 14.5 % (ref 11.5–14.5)
GLUCOSE SERPL-MCNC: 149 MG/DL (ref 74–99)
HCT VFR BLD AUTO: 37.2 % (ref 36–46)
HGB BLD-MCNC: 12 G/DL (ref 12–16)
IMM GRANULOCYTES # BLD AUTO: 0.04 X10*3/UL (ref 0–0.5)
IMM GRANULOCYTES NFR BLD AUTO: 0.5 % (ref 0–0.9)
LEGIONELLA AG UR QL: NEGATIVE
LYMPHOCYTES # BLD AUTO: 1.2 X10*3/UL (ref 0.8–3)
LYMPHOCYTES NFR BLD AUTO: 13.5 %
MCH RBC QN AUTO: 33.6 PG (ref 26–34)
MCHC RBC AUTO-ENTMCNC: 32.3 G/DL (ref 32–36)
MCV RBC AUTO: 104 FL (ref 80–100)
MONOCYTES # BLD AUTO: 0.84 X10*3/UL (ref 0.05–0.8)
MONOCYTES NFR BLD AUTO: 9.5 %
NEUTROPHILS # BLD AUTO: 6.6 X10*3/UL (ref 1.6–5.5)
NEUTROPHILS NFR BLD AUTO: 74.3 %
NRBC BLD-RTO: 0 /100 WBCS (ref 0–0)
PLATELET # BLD AUTO: 202 X10*3/UL (ref 150–450)
POTASSIUM SERPL-SCNC: 3.7 MMOL/L (ref 3.5–5.3)
RBC # BLD AUTO: 3.57 X10*6/UL (ref 4–5.2)
S PNEUM AG UR QL: NEGATIVE
SODIUM SERPL-SCNC: 139 MMOL/L (ref 136–145)
WBC # BLD AUTO: 8.9 X10*3/UL (ref 4.4–11.3)

## 2025-06-26 PROCEDURE — 80048 BASIC METABOLIC PNL TOTAL CA: CPT | Performed by: INTERNAL MEDICINE

## 2025-06-26 PROCEDURE — 2500000002 HC RX 250 W HCPCS SELF ADMINISTERED DRUGS (ALT 637 FOR MEDICARE OP, ALT 636 FOR OP/ED): Performed by: INTERNAL MEDICINE

## 2025-06-26 PROCEDURE — 85025 COMPLETE CBC W/AUTO DIFF WBC: CPT | Performed by: INTERNAL MEDICINE

## 2025-06-26 PROCEDURE — 2500000004 HC RX 250 GENERAL PHARMACY W/ HCPCS (ALT 636 FOR OP/ED)

## 2025-06-26 PROCEDURE — 2500000001 HC RX 250 WO HCPCS SELF ADMINISTERED DRUGS (ALT 637 FOR MEDICARE OP): Performed by: NURSE PRACTITIONER

## 2025-06-26 PROCEDURE — 2500000001 HC RX 250 WO HCPCS SELF ADMINISTERED DRUGS (ALT 637 FOR MEDICARE OP): Performed by: INTERNAL MEDICINE

## 2025-06-26 PROCEDURE — 99221 1ST HOSP IP/OBS SF/LOW 40: CPT | Performed by: NURSE PRACTITIONER

## 2025-06-26 PROCEDURE — 99239 HOSP IP/OBS DSCHRG MGMT >30: CPT | Performed by: INTERNAL MEDICINE

## 2025-06-26 PROCEDURE — 36415 COLL VENOUS BLD VENIPUNCTURE: CPT | Performed by: INTERNAL MEDICINE

## 2025-06-26 RX ORDER — LEVOFLOXACIN 750 MG/1
750 TABLET, FILM COATED ORAL EVERY 24 HOURS
Qty: 5 TABLET | Refills: 0 | Status: SHIPPED | OUTPATIENT
Start: 2025-06-26 | End: 2025-07-01

## 2025-06-26 RX ADMIN — GABAPENTIN 200 MG: 100 CAPSULE ORAL at 08:01

## 2025-06-26 RX ADMIN — ASPIRIN 81 MG: 81 TABLET, COATED ORAL at 08:01

## 2025-06-26 RX ADMIN — PANTOPRAZOLE SODIUM 40 MG: 40 TABLET, DELAYED RELEASE ORAL at 05:25

## 2025-06-26 RX ADMIN — VALACYCLOVIR HYDROCHLORIDE 500 MG: 500 TABLET, FILM COATED ORAL at 08:01

## 2025-06-26 RX ADMIN — OXYCODONE HYDROCHLORIDE 5 MG: 5 TABLET ORAL at 00:40

## 2025-06-26 RX ADMIN — PIPERACILLIN SODIUM AND TAZOBACTAM SODIUM 3.38 G: 3; .375 INJECTION, SOLUTION INTRAVENOUS at 05:24

## 2025-06-26 ASSESSMENT — COGNITIVE AND FUNCTIONAL STATUS - GENERAL
MOBILITY SCORE: 24
MOBILITY SCORE: 24
DAILY ACTIVITIY SCORE: 24
DAILY ACTIVITIY SCORE: 24

## 2025-06-26 ASSESSMENT — ENCOUNTER SYMPTOMS
ENDOCRINE NEGATIVE: 1
NEUROLOGICAL NEGATIVE: 1
SHORTNESS OF BREATH: 1
HEMATOLOGIC/LYMPHATIC NEGATIVE: 1
BACK PAIN: 1
PSYCHIATRIC NEGATIVE: 1
GASTROINTESTINAL NEGATIVE: 1
COUGH: 1
FATIGUE: 1
ALLERGIC/IMMUNOLOGIC NEGATIVE: 1
EYES NEGATIVE: 1

## 2025-06-26 ASSESSMENT — PAIN - FUNCTIONAL ASSESSMENT: PAIN_FUNCTIONAL_ASSESSMENT: 0-10

## 2025-06-26 ASSESSMENT — PAIN DESCRIPTION - ORIENTATION: ORIENTATION: RIGHT

## 2025-06-26 ASSESSMENT — PAIN DESCRIPTION - DESCRIPTORS: DESCRIPTORS: ACHING

## 2025-06-26 ASSESSMENT — PAIN SCALES - GENERAL: PAINLEVEL_OUTOF10: 0 - NO PAIN

## 2025-06-26 ASSESSMENT — PAIN DESCRIPTION - LOCATION: LOCATION: BREAST

## 2025-06-26 NOTE — DISCHARGE SUMMARY
Discharge Diagnosis  Pneumonia of right lower lobe due to infectious organism    Malnutrition Diagnosis Status: New  Malnutrition Diagnosis: Moderate malnutrition related to chronic disease or condition  Related to: hx esophageal cancer with ongoing treatment side effects  As Evidenced by: mild/moderate muscle wasting, mild/moderate fat loss, <75% of estimated energy requirements for >1 month  I agree with the dietitian's malnutrition diagnosis.        Issues Requiring Follow-Up  Follow-up with PCP in a week.  Follow-up with pulmonology in a week.  Obtain outpatient pulmonary function tests.  Follow-up with oncology in outpatient settings in the next 2 weeks.    Discharge Meds     Medication List      START taking these medications     levoFLOXacin 750 mg tablet; Commonly known as: Levaquin; Take 1 tablet   (750 mg) by mouth once every 24 hours for 5 days.     CONTINUE taking these medications     albuterol 90 mcg/actuation inhaler; Inhale 2 puffs every 6 hours if   needed for wheezing.   ALPRAZolam 0.25 mg tablet; Commonly known as: Xanax; Take 1 tablet (0.25   mg) by mouth 3 times a day as needed for anxiety or sleep.   aspirin 81 mg EC tablet   cholecalciferol 125 mcg (5,000 units) capsule; Commonly known as:   Vitamin D-3   ferrous sulfate 325 mg (65 mg elemental) tablet   gabapentin 100 mg capsule; Commonly known as: Neurontin; Take 1 capsule   (100 mg) by mouth once daily at bedtime. Can double dose after 3-4 day if   no side effects   ketoconazole 2 % cream; Commonly known as: NIZOral; Apply topically 2   times a day.   melatonin 5 mg capsule   omega-3 60- mg capsule; Commonly known as: Fish Oil   omeprazole 40 mg DR capsule; Commonly known as: PriLOSEC; TAKE 1 CAPSULE   BY MOUTH DAILY   potassium gluconate 595 mg (99 mg) tablet extended release   sodium-potassium bicarbonate 344-1,050-1,000 mg dispersible tablet;   Commonly known as: Deng Gold   triamcinolone 0.1 % ointment; Commonly known as:  Kenalog; Apply   topically 2 times a day as needed for irritation or rash.   valACYclovir 500 mg tablet; Commonly known as: Valtrex; Take 1 tablet   (500 mg) by mouth 2 times a day for 5 days.     STOP taking these medications     apple cider vinegar 300 mg tablet   ascorbic acid 1,000 mg tablet; Commonly known as: Vitamin C   b complex vitamins capsule   bismuth subsalicylate 262 mg/15 mL suspension; Commonly known as: Pepto   Bismol   magnesium 250 mg tablet   turmeric root extract 500 mg tablet   Zypitamag 2 mg tablet; Generic drug: pitavastatin magnesium     ASK your doctor about these medications     pitavastatin calcium 1 mg tablet; Take 1 mg by mouth once daily.       Test Results Pending At Discharge  Pending Labs       Order Current Status    Staphylococcus aureus/MRSA colonization, Culture In process            Hospital Course   Sarah Chavez is a 74 y.o. female with past medical history Esophageal Cancer s/p Esophagectomy + jejunostomy & FOLFOX + 1 year neoadjuvant Nivolumab (2022), Anxiety, Tobacco Abuse, prior PE (completed 6 months of AC, HLD comes to the hospital due to right-sided pleuritic chest pain. Patient with multiple visits and has already completed a course of doxycycline in outpatient settings for right lower lobe pneumonia. CT PE study reveals no evidence of PE but reveals right lower lobe infiltrates suspicious for pneumonia and small loculated right pleural effusion. ED discussed with IR for IR effusions are not large enough for aspiration/biopsy.  Patient was started with vancomycin and Zosyn.  Did develop a rash due to vancomycin that was discontinued.  The patient had persistent pleuritic chest pain pulmonology were consulted.  IR were reconsulted but they did not think that the pleural effusion was abnormal to aspiration biopsy at this time.  Thoracic surgery were consulted and they did not think that biopsy could be obtained.  Patient is recommended to continue follow-up in  outpatient setting with PCP, pulmonology for further evaluation and management and repeat imaging to ensure resolution.  Patient is also recommended to follow-up with oncology next 2 weeks given history of esophageal cancer.  The patient was tested for home oxygen requirement but she did not qualify and did not require any oxygen.  She is otherwise stable for discharge with outpatient follow-up    Pertinent Physical Exam At Time of Discharge  Physical Exam  Constitutional:       Appearance: Normal appearance.   HENT:      Head: Normocephalic and atraumatic.      Nose: Nose normal.      Mouth/Throat:      Mouth: Mucous membranes are dry.   Eyes:      Extraocular Movements: Extraocular movements intact.      Conjunctiva/sclera: Conjunctivae normal.   Cardiovascular:      Rate and Rhythm: Normal rate and regular rhythm.      Pulses: Normal pulses.      Heart sounds: Normal heart sounds.   Pulmonary:      Effort: Pulmonary effort is normal.      Breath sounds: Normal breath sounds.   Abdominal:      General: Bowel sounds are normal.      Palpations: Abdomen is soft.   Musculoskeletal:         General: Normal range of motion.      Cervical back: Normal range of motion and neck supple.   Skin:     General: Skin is warm and dry.   Neurological:      General: No focal deficit present.      Mental Status: She is alert and oriented to person, place, and time. Mental status is at baseline.   Psychiatric:         Mood and Affect: Mood normal.         Outpatient Follow-Up  Future Appointments   Date Time Provider Department [place]   7/8/2025 10:15 AM Ty Gordon MD [id number] [place]   7/8/2025 11:00 AM [facility] [id number] [id number] [facility]   7/23/2025 10:00 AM [facility] MOBILE CT [id number] [facility]   7/29/2025 12:45 PM Sylvia Poon DO [id number] [facility]   7/31/2025 10:00 AM Renay Lau MD [id number] [place]   8/11/2025  5:30 PM Sima Miner MD [id number] [place]         Scott Mack MD

## 2025-06-26 NOTE — CONSULTS
Inpatient consult to Thoracic Surgery  Consult performed by: Lia Shay, APRN-CNP  Consult ordered by: Scott Mack MD        Reason For Consult  Loculated pleural effusion    History Of Present Illness  Sarah Chavez is a 74 y.o. 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 Has recently completed several courses of antibiotics. Presented to ED on 6/24 with complaints of right sided chest pain. CTA of chest which was negative for PE, noted small area of loculated right pleural effusion and right lower lobe infiltrate.  Remaining lab assessment unremarkable.  Admitted for further management. Thoracic surgery consulted for evaluation of loculated effusion. Findings were reviewed by Dr Whatley, unlikely surgery at this point would be helpful as effusion is small, and poses additional procedural risk as it is a reoperation. Recommend continued treatment with antibiotics, will see in follow up in 4 weeks with repeat CT prior.     Past Medical History  She has a past medical history of Abdominal wall pain, Abnormal EKG, Anxiety, Bilateral kidney stones, Crohn's disease (Multi), Dysphagia, GERD (gastroesophageal reflux disease), History of blood transfusion, History of esophageal cancer, History of goiter, History of mammogram (07/05/2024), chronic pancreatitis, Pulmonary embolism, Seasonal allergies, Thyrotoxicosis, and Wears reading eyeglasses.    Surgical History  She has a past surgical history that includes Hysterectomy (1987); Cholecystectomy (1994); Shoulder surgery (Right, 1977); Appendectomy (1981); Pancreas surgery (2019); Colonoscopy (09/2019); Esophagus surgery; Splenectomy, total; Thyroidectomy, partial;  Cystoscopy; Lithotripsy; Skin graft (Left); and Cardiac catheterization (Left, 6/16/2025).     Social History  She reports that she quit smoking about 12 years ago. Her smoking use included cigarettes. She has never been exposed to tobacco smoke. She has never used smokeless tobacco. She reports that she does not currently use alcohol. She reports that she does not use drugs.    Family History  Family History[1]     Allergies  Atorvastatin, Rosuvastatin, Statins-hmg-coa reductase inhibitors, and Vancomycin    Review of Systems   Constitutional:  Positive for fatigue.   HENT: Negative.     Eyes: Negative.    Respiratory:  Positive for cough and shortness of breath.    Cardiovascular:  Positive for chest pain.   Gastrointestinal: Negative.    Endocrine: Negative.    Genitourinary: Negative.    Musculoskeletal:  Positive for back pain.   Skin: Negative.    Allergic/Immunologic: Negative.    Neurological: Negative.    Hematological: Negative.    Psychiatric/Behavioral: Negative.          Physical Exam  Constitutional:       General: She is not in acute distress.  HENT:      Head: Normocephalic.      Nose: Nose normal.      Mouth/Throat:      Mouth: Mucous membranes are moist.   Eyes:      Pupils: Pupils are equal, round, and reactive to light.   Cardiovascular:      Rate and Rhythm: Normal rate and regular rhythm.      Heart sounds: Normal heart sounds.   Pulmonary:      Effort: Pulmonary effort is normal.      Comments: Faint right basilar crackles  Abdominal:      General: Bowel sounds are normal.   Musculoskeletal:         General: Normal range of motion.      Cervical back: Normal range of motion.   Skin:     General: Skin is warm and dry.   Neurological:      General: No focal deficit present.      Mental Status: She is alert and oriented to person, place, and time.   Psychiatric:         Mood and Affect: Mood normal.          Last Recorded Vitals  Blood pressure 120/67, pulse 68, temperature 36.2 °C (97.2 °F),  "temperature source Temporal, resp. rate 16, height 1.575 m (5' 2\"), weight 54 kg (119 lb), last menstrual period 01/01/1987, SpO2 94%.    Relevant Results  Scheduled medications  Scheduled Medications[2]  Continuous medications  Continuous Medications[3]  PRN medications  PRN Medications[4]     Results for orders placed or performed during the hospital encounter of 06/24/25 (from the past 24 hours)   Legionella Antigen, Urine    Specimen: Clean Catch/Voided; Urine   Result Value Ref Range    L. pneumophila Urine Ag Negative Negative   Streptococcus pneumoniae Antigen, Urine    Specimen: Clean Catch/Voided; Urine   Result Value Ref Range    Streptococcus pneumoniae Ag, Urine Negative Negative   CBC and Auto Differential   Result Value Ref Range    WBC 8.9 4.4 - 11.3 x10*3/uL    nRBC 0.0 0.0 - 0.0 /100 WBCs    RBC 3.57 (L) 4.00 - 5.20 x10*6/uL    Hemoglobin 12.0 12.0 - 16.0 g/dL    Hematocrit 37.2 36.0 - 46.0 %     (H) 80 - 100 fL    MCH 33.6 26.0 - 34.0 pg    MCHC 32.3 32.0 - 36.0 g/dL    RDW 14.5 11.5 - 14.5 %    Platelets 202 150 - 450 x10*3/uL    Neutrophils % 74.3 40.0 - 80.0 %    Immature Granulocytes %, Automated 0.5 0.0 - 0.9 %    Lymphocytes % 13.5 13.0 - 44.0 %    Monocytes % 9.5 2.0 - 10.0 %    Eosinophils % 1.7 0.0 - 6.0 %    Basophils % 0.5 0.0 - 2.0 %    Neutrophils Absolute 6.60 (H) 1.60 - 5.50 x10*3/uL    Immature Granulocytes Absolute, Automated 0.04 0.00 - 0.50 x10*3/uL    Lymphocytes Absolute 1.20 0.80 - 3.00 x10*3/uL    Monocytes Absolute 0.84 (H) 0.05 - 0.80 x10*3/uL    Eosinophils Absolute 0.15 0.00 - 0.40 x10*3/uL    Basophils Absolute 0.04 0.00 - 0.10 x10*3/uL   Basic Metabolic Panel   Result Value Ref Range    Glucose 149 (H) 74 - 99 mg/dL    Sodium 139 136 - 145 mmol/L    Potassium 3.7 3.5 - 5.3 mmol/L    Chloride 106 98 - 107 mmol/L    Bicarbonate 26 21 - 32 mmol/L    Anion Gap 11 10 - 20 mmol/L    Urea Nitrogen 14 6 - 23 mg/dL    Creatinine 0.79 0.50 - 1.05 mg/dL    eGFR 79 >60 " mL/min/1.73m*2    Calcium 8.5 (L) 8.6 - 10.3 mg/dL      No results found.      Assessment/Plan     Pleural effusion  Admitted with persistent right sided pleuritic chest pain and dyspnea, recently treated for PNA. CT showing small right pleural effusion and lower lobe infiltrate.  Findings discussed with Dr. Whatley, images reviewed.   -Pleural effusion is small and unlikely to benefit from surgical intervention at this time, additionally would be a higher surgical risk due to reoperative status. Recommend treatment with antibiotics, and follow up with CT of chest and thoracic surgery in 4 weeks    I spent 60 minutes in the professional and overall care of this patient.                 [1]   Family History  Problem Relation Name Age of Onset    Leukemia Mother      Other (motor vehicle accident) Father     [2] ALPRAZolam, 0.25 mg, oral, Nightly  aspirin, 81 mg, oral, Daily  enoxaparin, 40 mg, subcutaneous, q24h  gabapentin, 200 mg, oral, Daily  pantoprazole, 40 mg, oral, Daily before breakfast  piperacillin-tazobactam, 3.375 g, intravenous, q8h  polyethylene glycol, 17 g, oral, Daily  valACYclovir, 500 mg, oral, BID  [3]    [4] PRN medications: acetaminophen **OR** acetaminophen **OR** acetaminophen, albuterol, bisacodyl, traMADol

## 2025-06-26 NOTE — PROGRESS NOTES
"Sarah Chavez is a 74 y.o. female on day 1 of admission presenting with Pneumonia of right lower lobe due to infectious organism.    Subjective   Still with lot of R sided CP       Objective     Physical Exam    Last Recorded Vitals  Blood pressure 113/63, pulse 75, temperature 36.1 °C (97 °F), resp. rate 17, height 1.575 m (5' 2\"), weight 54 kg (119 lb), last menstrual period 01/01/1987, SpO2 94%.  Intake/Output last 3 Shifts:  I/O last 3 completed shifts:  In: 661 (12.2 mL/kg) [IV Piggyback:661]  Out: - (0 mL/kg)   Weight: 54 kg     Relevant Results  {If you would like to pull in Medications, type .meds     If you would like to pull in Lab results for the last 24 hours, type .[id number]    If you would like to pull in Imaging results, type .imgrslt :99}    {Link to Stroke Scoring tools - Link :99}                 {Current documented malnutrition diagnosis is Moderate malnutrition related to chronic disease or condition   As evidenced by mild/moderate muscle wasting, mild/moderate fat loss, <75% of estimated energy requirements for >1 month  If there is no attestation selection below, enter .CHECKMALNUTRITIONDX to attest :99}  {Accept, Reject, Defer:91603}      Assessment & Plan  Pneumonia of right lower lobe due to infectious organism    ***    {This patient does not have an ACP note on file for this encounter, please fill one out - Advance Care Planning Activity :99}  I spent *** minutes in the professional and overall care of this patient.      Heber Gill MD    " principles of ALARA.     FINDINGS:     No filling defects are seen in the main pulmonary artery, left or     right pulmonary arteries, or first order branches to indicate an     embolus.  No early filling veins are visualized indicate an     arteriovenous malformation.  There is a right lower lobe infiltrate,     suspicious for pneumonia.  There is a small loculated right pleural     effusion.  No pneumothorax is noted.  No filling defects are seen     within the trachea or mainstem bronchi.  No enlarged lymph nodes are     seen in the mediastinal or hilar regions.  There is no pericardial     effusion.  Coronary artery calcifications are present.  There appears     to be evidence of a pull-through esophagectomy.  There is no evidence     of aneurysmal dilatation of the ascending aorta, aortic arch, or     descending thoracic aorta.  Degenerative changes are seen within the     thoracic spine.     This report was called to Dr. Mayra Escalona at 12:15 PM on June 24, 2025.     Impression: 1.  No evidence of a pulmonary embolus.     2.  Right lower lobe infiltrate, suspicious for pneumonia.       3.  Small loculated right pleural effusion.     4.  Coronary artery calcifications.     5.  Evidence of a gastric pull-through after esophagectomy.     Signed by Maulik Malik MD     ECG 12 lead     Unusual P axis, possible ectopic atrial rhythm     Incomplete right bundle branch block     Nonspecific T wave abnormality     Abnormal ECG     When compared with ECG of 16-JUN-2025 10:14,     Nonspecific T wave abnormality now evident in Lateral leads                 Physical Exam     Constitutional:          Appearance: Normal appearance.      HENT:         Head: Normocephalic and atraumatic.         Nose: Nose normal.         Mouth/Throat:         Mouth: Mucous membranes are dry.      Eyes:         Extraocular Movements: Extraocular movements intact.         Conjunctiva/sclera: Conjunctivae normal.      Cardiovascular:          Rate and Rhythm: Normal rate and regular rhythm.         Pulses: Normal pulses.         Heart sounds: Normal heart sounds.      Pulmonary:         Effort: Pulmonary effort is normal.         Breath sounds: Normal breath sounds.      Chest:         Chest wall: Tenderness present.      Abdominal:         General: Bowel sounds are normal.         Palpations: Abdomen is soft.      Musculoskeletal:            General: Normal range of motion.         Cervical back: Normal range of motion and neck supple.      Skin:        General: Skin is warm and dry.      Neurological:         General: No focal deficit present.         Mental Status: She is alert and oriented to person, place, and time. Mental status is at baseline.      Psychiatric:            Mood and Affect: Mood normal.                        Relevant Results      Sarah Chavez is a 74 y.o. female with past medical history Esophageal Cancer s/p Esophagectomy + jejunostomy & FOLFOX + 1 year neoadjuvant Nivolumab (2022), Anxiety, Tobacco Abuse, prior PE (completed 6 months of AC, HLD comes to the hospital due to right-sided pleuritic chest pain.       Assessment & Plan     Pneumonia of right lower lobe due to infectious organism           Right lower lobe pneumonia     Pleuritic chest pain     - Patient with multiple visits and has already completed a course of doxycycline in outpatient settings for right lower lobe pneumonia.     - CT PE study reveals no evidence of PE but reveals right lower lobe infiltrates suspicious for pneumonia and small loculated right pleural effusion.     - ED discussed with IR for IR effusions are not large enough for aspiration/biopsy.  Will consult IR for further evaluation as the patient has persistent severe pleuritic pain and is afebrile without any leukocytosis.     - Continue with Zosyn.  Vancomycin was discontinued due to development of some rash.     - Troponins are normal recent left heart cath reveals normal coronaries.            History of esophageal cancer     - Per the patient she completed treatment in 2022 and per the patient it has been in remission.  She does get surveillance CT scan every 6 months.     - Continue with outpatient follow-up           Anxiety and depression     Rheumatoid arthritis     - Continue with home medications           VTE prophylaxis-Lovenox        I spent 25 minutes in the professional and overall care of this patient.      Heber Gill MD

## 2025-06-26 NOTE — CARE PLAN
The patient's goals for the shift include pt will recover from the infection    The clinical goals for the shift include Improved respiratory function

## 2025-06-26 NOTE — DOCUMENTATION CLARIFICATION NOTE
"    PATIENT:               KEYON HAYNES  ACCT #:                  [id number]  MRN:                       [id number]  :                       1950  ADMIT DATE:       2025 10:25 AM  DISCH DATE:  RESPONDING PROVIDER #:        [id number]          PROVIDER RESPONSE TEXT:    Gram Negative PNA    CDI QUERY TEXT:    Clarification      Instruction:    Based on your assessment of the patient and the clinical information, please provide the requested documentation by clicking on the appropriate radio button and enter any additional information if prompted.    Question: Please further specify the type of pneumonia being treated    When answering this query, please exercise your independent professional judgment. The fact that a question is being asked, does not imply that any particular answer is desired or expected.    The patient's clinical indicators include:  Clinical Information: 74 yof with pneumonia    Clinical Indicators:     CT Chest: \"...Right lower lobe infiltrate, suspicious for pneumonia...\"    Discharge Diagnosis :  \"Pneumonia of right lower lobe due to infectious organism  Hospital Course :  Patient with multiple visits and has already completed a course of doxycycline in outpatient settings for right lower lobe pneumonia.\"    Treatment: -current IV Zosyn, CT Chest    Risk Factors: PMH right lower lobe pneumonia  Options provided:  -- Aspiration PNA  -- Gram Negative PNA  -- Other - I will add my own diagnosis  -- Refer to Clinical Documentation Reviewer    Query created by: Mary Castillo on 2025 1:59 PM      Electronically signed by:  RHEA LOPES MD 2025 2:50 PM          "

## 2025-06-27 ENCOUNTER — PATIENT OUTREACH (OUTPATIENT)
Dept: PRIMARY CARE | Facility: CLINIC | Age: 75
End: 2025-06-27
Payer: MEDICARE

## 2025-06-27 ENCOUNTER — TELEPHONE (OUTPATIENT)
Dept: HEMATOLOGY/ONCOLOGY | Facility: CLINIC | Age: 75
End: 2025-06-27
Payer: MEDICARE

## 2025-06-27 LAB — STAPHYLOCOCCUS SPEC CULT: NORMAL

## 2025-06-27 NOTE — TELEPHONE ENCOUNTER
Reason for Conversation  No chief complaint on file.    Background   Patient called main campus asking for sooner than scheduled appt for 7/30.  Can we find out why?    Disposition   No disposition on file.

## 2025-06-27 NOTE — TELEPHONE ENCOUNTER
Spoke with the patient. States she has FUV on July 31st with Dr. Lau & CT on July 23rd. Has been in hospital with pneumonia x2 since Memorial day. Explained patient had CT chest in hospital and explained that Dr. Lau ordered CT CAP- pt will now keep CT CAP scheduled for July 23rd. . Did explained to patient I would update Dr. Lau on patient's recent hospitalizations and call her back once I heat if Dr. Lau needs to see patient sooner.

## 2025-06-27 NOTE — TELEPHONE ENCOUNTER
Spoke with the patient. Provided update that Dr. Lau is OK with current plan of CT and FUV as scheduled. Pt verbalized understanding and had no further questions or concerns at this time.

## 2025-06-27 NOTE — PROGRESS NOTES
Discharge Facility: [facility]  Discharge Diagnosis: Pneumonia of right lower lobe due to infectious organism   Admission Date: 6/24/25  Discharge Date: 6/26/25    PCP Appointment Date: 6/30/25  Specialist Appointment Date: Oph Dr Church 6/30/25  Cardiology Dr Gordon 7/8/25  Thoracic Surg Dr Poon 7/29/25  Hem Onc Dr Lau 7/31/25  Hospital Encounter and Summary Linked: Yes  ED to Hosp-Admission (Discharged) with Scott Mack MD; Logan ASIF MD (06/24/2025)     See discharge assessment below for further details     Wrap Up  Wrap Up Additional Comments: This CM spoke with patient via phone. Successful transition of care outreach complete. Pt reports doing well at home since discharge. Frequent cough noted during outreach. New meds reviewed. Pt denies any prescription medication questions. CM discussed referencing discharge paperwork to follow detailed daily medication schedule. Pt denies CP and SOB. Patient denies any further discharge questions/needs at this time. Emphasized that Follow up is needed after discharge to review the hospital recommendations, assess your response to your treatment. The pt has a scheduled follow up appointment with PCP. Pt aware of my availability for non-emergent concerns. Contact info provided to patient. (6/27/2025 11:37 AM)    Medications  Medications reviewed with patient/caregiver?: Yes (6/27/2025 11:37 AM)  Is the patient having any side effects they believe may be caused by any medication additions or changes?: No (6/27/2025 11:37 AM)  Does the patient have all medications ordered at discharge?: Yes (6/27/2025 11:37 AM)  Care Management Interventions: No intervention needed (6/27/2025 11:37 AM)  Prescription Comments: levoFLOXacin (Levaquin) (6/27/2025 11:37 AM)  Is the patient taking all medications as directed (includes completed medication regime)?: Yes (6/27/2025 11:37 AM)  Medication Comments: no noted issues obtaining medications (6/27/2025 11:37  AM)    Appointments  Care Management Interventions: Verified appointment date/time/provider (6/27/2025 11:37 AM)  Has the patient kept scheduled appointments due by today?: Yes (6/27/2025 11:37 AM)  Care Management Interventions: Advised patient to keep appointment (6/27/2025 11:37 AM)    Self Management  What is the home health agency?: denies need (6/27/2025 11:37 AM)  What Durable Medical Equipment (DME) was ordered?: denies need (6/27/2025 11:37 AM)    Patient Teaching  Does the patient have access to their discharge instructions?: Yes (6/27/2025 11:37 AM)  Care Management Interventions: Reviewed instructions with patient (6/27/2025 11:37 AM)  What is the patient's perception of their health status since discharge?: Same (6/27/2025 11:37 AM)  Is the patient/caregiver able to teach back the hierarchy of who to call/visit for symptoms/problems? PCP, Specialist, Home Health nurse, Urgent Care, ED, 911: Yes (6/27/2025 11:37 AM)

## 2025-06-30 ENCOUNTER — APPOINTMENT (OUTPATIENT)
Dept: PRIMARY CARE | Facility: CLINIC | Age: 75
End: 2025-06-30
Payer: MEDICARE

## 2025-06-30 VITALS
DIASTOLIC BLOOD PRESSURE: 64 MMHG | TEMPERATURE: 97.3 F | BODY MASS INDEX: 21.71 KG/M2 | HEIGHT: 62 IN | HEART RATE: 68 BPM | WEIGHT: 118 LBS | OXYGEN SATURATION: 97 % | SYSTOLIC BLOOD PRESSURE: 122 MMHG | RESPIRATION RATE: 16 BRPM

## 2025-06-30 DIAGNOSIS — E78.2 MIXED HYPERLIPIDEMIA: ICD-10-CM

## 2025-06-30 DIAGNOSIS — Z09 HOSPITAL DISCHARGE FOLLOW-UP: ICD-10-CM

## 2025-06-30 DIAGNOSIS — Z79.899 MEDICATION MANAGEMENT: ICD-10-CM

## 2025-06-30 DIAGNOSIS — F41.9 ANXIETY: ICD-10-CM

## 2025-06-30 DIAGNOSIS — C77.2 SECONDARY AND UNSPECIFIED MALIGNANT NEOPLASM OF INTRA-ABDOMINAL LYMPH NODES (MULTI): ICD-10-CM

## 2025-06-30 DIAGNOSIS — J18.9 PNEUMONIA DUE TO INFECTIOUS ORGANISM, UNSPECIFIED LATERALITY, UNSPECIFIED PART OF LUNG: ICD-10-CM

## 2025-06-30 DIAGNOSIS — F32.0 DEPRESSION, MAJOR, SINGLE EPISODE, MILD: ICD-10-CM

## 2025-06-30 DIAGNOSIS — C73 PAPILLARY THYROID CARCINOMA: ICD-10-CM

## 2025-06-30 DIAGNOSIS — C15.5 CANCER OF ABDOMINAL ESOPHAGUS (MULTI): ICD-10-CM

## 2025-06-30 DIAGNOSIS — A41.9 SEPSIS, DUE TO UNSPECIFIED ORGANISM, UNSPECIFIED WHETHER ACUTE ORGAN DYSFUNCTION PRESENT (MULTI): ICD-10-CM

## 2025-06-30 PROCEDURE — 3008F BODY MASS INDEX DOCD: CPT | Performed by: FAMILY MEDICINE

## 2025-06-30 PROCEDURE — 1159F MED LIST DOCD IN RCRD: CPT | Performed by: FAMILY MEDICINE

## 2025-06-30 PROCEDURE — 1111F DSCHRG MED/CURRENT MED MERGE: CPT | Performed by: FAMILY MEDICINE

## 2025-06-30 PROCEDURE — 1036F TOBACCO NON-USER: CPT | Performed by: FAMILY MEDICINE

## 2025-06-30 PROCEDURE — 99496 TRANSJ CARE MGMT HIGH F2F 7D: CPT | Performed by: FAMILY MEDICINE

## 2025-06-30 PROCEDURE — 1160F RVW MEDS BY RX/DR IN RCRD: CPT | Performed by: FAMILY MEDICINE

## 2025-06-30 RX ORDER — VALACYCLOVIR HYDROCHLORIDE 500 MG/1
TABLET, FILM COATED ORAL
COMMUNITY
Start: 2025-06-23 | End: 2025-06-30 | Stop reason: SDUPTHER

## 2025-06-30 NOTE — PROGRESS NOTES
Covid vax: UTD  Flu: UTD  Pneumo: UTD  RSV: UTD  Shingles: UTD    CRC: [id number] to tolerate prep  Mammogram: 7/5/2024-ordered  Pap: hysterectomy  Lmp: hysterectomy

## 2025-06-30 NOTE — PROGRESS NOTES
"Subjective   Patient ID: Sarah Chavez is a 74 y.o. female who presents for   Chief Complaint   Patient presents with    Hospital Follow-up     [facility]  [facility]  Discharge Diagnosis: Pneumonia of right lower lobe due to infectious organism   Admission Date: 6/24/25  Discharge Date: 6/26/25  Currently on Levaquin       Covid vax: UTD  Flu: UTD  Pneumo: UTD  RSV: UTD  Shingles: UTD   hsv2 explained also and valtrex bid x5d  Has valtrex    CRC: [id number] to tolerate prep  Mammogram: 7/5/2024-ordered  Pap: hysterectomy  Lmp: hysterectomy   felt fine after being here  And all wkd  Then the following Monday-went into pool  Felt sob-cough    HPI  Problem List[1]    Surgical History[2]    Review of Systems  This patient has  NO history of seizures/ CAD or CVA    NO history of recent Covid nor flu symptoms,    NO Fever nor chills today,    NO Chest pain, shortness of breath nor paroxysmal nocturnal dyspnea,  NO Nausea, vomiting, nor diarrhea,  NO Hematochezia nor melena,  NO Dysuria, hematuria, nor new incontinence issues  NO new severe headaches nor neurological complaints,  NO new issues with anxiety nor depression nor new psychiatric complaints,  NO suicidal nor homicidal ideations.     OBJECTIVE:  /64   Pulse 68   Temp 36.3 °C (97.3 °F) (Temporal)   Resp 16   Ht 1.575 m (5' 2\")   Wt 53.5 kg (118 lb)   LMP 01/01/1987 (Approximate)   SpO2 97%   BMI 21.58 kg/m²      General:  alert, oriented, no acute distress.  No obvious skin rashes noted.       Mood is pleasant,  no signs of emotional distress.   Not appearing intoxicated or altered.   No voiced delusions,   Normal, appropriate behavior.    HEENT: Normocephalic, atraumatic,   Pupils round, reactive to light  Extraocular motions intact and wnl  Tympanic membranes normal    Neck: no nuchal rigidity  No masses palpable.  No carotid bruits.  No thyromegaly.    Respiratory: Equal breath sounds  No wheezes,   no major  rales,    nor " rhonchi (just scant rales r base) good air exchange  No respiratory distress.    Heart: Regular rate and rhythm, no    murmurs  no rubs/gallops    Abdomen: no masses palpable, nontender, no rebound nor guarding.    Extremities: NO cyanosis noted, no clubbing.   No edema noted.  2+dorsalis pedis pulses.    Normal-not antalgic, steady gait.    Admission on 06/24/2025, Discharged on 06/26/2025   Component Date Value Ref Range Status    Ventricular Rate 06/24/2025 65  BPM Preliminary    Atrial Rate 06/24/2025 65  BPM Preliminary    [place] Interval 06/24/2025 116  ms Preliminary    QRS Duration 06/24/2025 112  ms Preliminary    QT Interval 06/24/2025 450  ms Preliminary    QTC Calculation(Bazett) 06/24/2025 468  ms Preliminary    P Axis 06/24/2025 -43  degrees Preliminary    R Axis 06/24/2025 -27  degrees Preliminary    T Axis 06/24/2025 -30  degrees Preliminary    QRS Count 06/24/2025 11  beats Preliminary    Q Onset 06/24/2025 207  ms Preliminary    P Onset 06/24/2025 149  ms Preliminary    P Offset 06/24/2025 198  ms Preliminary    T Offset 06/24/2025 432  ms Preliminary    QTC Fredericia 06/24/2025 462  ms Preliminary    WBC 06/24/2025 11.0  4.4 - 11.3 x10*3/uL Final    nRBC 06/24/2025 0.0  0.0 - 0.0 /100 WBCs Final    RBC 06/24/2025 3.94 (L)  4.00 - 5.20 x10*6/uL Final    Hemoglobin 06/24/2025 13.0  12.0 - 16.0 g/dL Final    Hematocrit 06/24/2025 39.1  36.0 - 46.0 % Final    MCV 06/24/2025 99  80 - 100 fL Final    MCH 06/24/2025 33.0  26.0 - 34.0 pg Final    MCHC 06/24/2025 33.2  32.0 - 36.0 g/dL Final    RDW 06/24/2025 14.2  11.5 - 14.5 % Final    Platelets 06/24/2025 215  150 - 450 x10*3/uL Final    Platelet count verified by smear review    Neutrophils % 06/24/2025 62.4  40.0 - 80.0 % Final    Immature Granulocytes %, Automated 06/24/2025 0.4  0.0 - 0.9 % Final    Immature Granulocyte Count (IG) includes promyelocytes, myelocytes and metamyelocytes but does not include bands. Percent differential counts (%) should be  interpreted in the context of the absolute cell counts (cells/UL).    Lymphocytes % 06/24/2025 21.1  13.0 - 44.0 % Final    Monocytes % 06/24/2025 14.8  2.0 - 10.0 % Final    Eosinophils % 06/24/2025 0.7  0.0 - 6.0 % Final    Basophils % 06/24/2025 0.6  0.0 - 2.0 % Final    Neutrophils Absolute 06/24/2025 6.87 (H)  1.60 - 5.50 x10*3/uL Final    Percent differential counts (%) should be interpreted in the context of the absolute cell counts (cells/uL).    Immature Granulocytes Absolute, Au* 06/24/2025 0.04  0.00 - 0.50 x10*3/uL Final    Lymphocytes Absolute 06/24/2025 2.33  0.80 - 3.00 x10*3/uL Final    Monocytes Absolute 06/24/2025 1.63 (H)  0.05 - 0.80 x10*3/uL Final    Eosinophils Absolute 06/24/2025 0.08  0.00 - 0.40 x10*3/uL Final    Basophils Absolute 06/24/2025 0.07  0.00 - 0.10 x10*3/uL Final    Magnesium 06/24/2025 2.07  1.60 - 2.40 mg/dL Final    Glucose 06/24/2025 128 (H)  74 - 99 mg/dL Final    Sodium 06/24/2025 139  136 - 145 mmol/L Final    Potassium 06/24/2025 4.1  3.5 - 5.3 mmol/L Final    Chloride 06/24/2025 105  98 - 107 mmol/L Final    Bicarbonate 06/24/2025 26  21 - 32 mmol/L Final    Anion Gap 06/24/2025 12  10 - 20 mmol/L Final    Urea Nitrogen 06/24/2025 10  6 - 23 mg/dL Final    Creatinine 06/24/2025 0.60  0.50 - 1.05 mg/dL Final    eGFR 06/24/2025 >90  >60 mL/min/1.73m*2 Final    Calculations of estimated GFR are performed using the 2021 CKD-EPI Study Refit equation without the race variable for the IDMS-Traceable creatinine methods.  https://jasn.asnjournals.org/content/early/2021/09/22/ASN.9046662647    Calcium 06/24/2025 9.0  8.6 - 10.3 mg/dL Final    Albumin 06/24/2025 3.7  3.4 - 5.0 g/dL Final    Alkaline Phosphatase 06/24/2025 79  33 - 136 U/L Final    Total Protein 06/24/2025 6.5  6.4 - 8.2 g/dL Final    AST 06/24/2025 16  9 - 39 U/L Final    Bilirubin, Total 06/24/2025 1.1  0.0 - 1.2 mg/dL Final    ALT 06/24/2025 13  7 - 45 U/L Final    Patients treated with Sulfasalazine may generate  falsely decreased results for ALT.    Protime 06/24/2025 11.8  9.8 - 12.4 seconds Final    INR 06/24/2025 1.1  0.9 - 1.1 Final    Troponin I, High Sensitivity 06/24/2025 8  0 - 13 ng/L Final    WBC 06/25/2025 13.1 (H)  4.4 - 11.3 x10*3/uL Final    nRBC 06/25/2025 0.0  0.0 - 0.0 /100 WBCs Final    RBC 06/25/2025 3.78 (L)  4.00 - 5.20 x10*6/uL Final    Hemoglobin 06/25/2025 12.6  12.0 - 16.0 g/dL Final    Hematocrit 06/25/2025 38.4  36.0 - 46.0 % Final    MCV 06/25/2025 102 (H)  80 - 100 fL Final    MCH 06/25/2025 33.3  26.0 - 34.0 pg Final    MCHC 06/25/2025 32.8  32.0 - 36.0 g/dL Final    RDW 06/25/2025 14.4  11.5 - 14.5 % Final    Platelets 06/25/2025 186  150 - 450 x10*3/uL Final    Glucose 06/25/2025 161 (H)  74 - 99 mg/dL Final    Sodium 06/25/2025 136  136 - 145 mmol/L Final    Potassium 06/25/2025 4.9  3.5 - 5.3 mmol/L Final    Chloride 06/25/2025 103  98 - 107 mmol/L Final    Bicarbonate 06/25/2025 29  21 - 32 mmol/L Final    Anion Gap 06/25/2025 9 (L)  10 - 20 mmol/L Final    Urea Nitrogen 06/25/2025 12  6 - 23 mg/dL Final    Creatinine 06/25/2025 0.72  0.50 - 1.05 mg/dL Final    eGFR 06/25/2025 88  >60 mL/min/1.73m*2 Final    Calculations of estimated GFR are performed using the 2021 CKD-EPI Study Refit equation without the race variable for the IDMS-Traceable creatinine methods.  https://jasn.asnjournals.org/content/early/2021/09/22/ASN.5922011035    Calcium 06/25/2025 8.8  8.6 - 10.3 mg/dL Final    Extra Tube 06/25/2025 Hold for add-ons.   Final    Auto resulted.    L. pneumophila Urine Ag 06/25/2025 Negative  Negative Final    Streptococcus pneumoniae Ag, Urine 06/25/2025 Negative  Negative Final    Staph/MRSA Screen Culture 06/25/2025 No Staphylococcus aureus isolated   Final    LDH 06/25/2025 176  84 - 246 U/L Final    Total Protein 06/25/2025 6.0 (L)  6.4 - 8.2 g/dL Final    Procalcitonin 06/25/2025 0.36 (H)  <=0.07 ng/mL Final    WBC 06/26/2025 8.9  4.4 - 11.3 x10*3/uL Final    nRBC 06/26/2025 0.0   0.0 - 0.0 /100 WBCs Final    RBC 06/26/2025 3.57 (L)  4.00 - 5.20 x10*6/uL Final    Hemoglobin 06/26/2025 12.0  12.0 - 16.0 g/dL Final    Hematocrit 06/26/2025 37.2  36.0 - 46.0 % Final    MCV 06/26/2025 104 (H)  80 - 100 fL Final    MCH 06/26/2025 33.6  26.0 - 34.0 pg Final    MCHC 06/26/2025 32.3  32.0 - 36.0 g/dL Final    RDW 06/26/2025 14.5  11.5 - 14.5 % Final    Platelets 06/26/2025 202  150 - 450 x10*3/uL Final    Neutrophils % 06/26/2025 74.3  40.0 - 80.0 % Final    Immature Granulocytes %, Automated 06/26/2025 0.5  0.0 - 0.9 % Final    Immature Granulocyte Count (IG) includes promyelocytes, myelocytes and metamyelocytes but does not include bands. Percent differential counts (%) should be interpreted in the context of the absolute cell counts (cells/UL).    Lymphocytes % 06/26/2025 13.5  13.0 - 44.0 % Final    Monocytes % 06/26/2025 9.5  2.0 - 10.0 % Final    Eosinophils % 06/26/2025 1.7  0.0 - 6.0 % Final    Basophils % 06/26/2025 0.5  0.0 - 2.0 % Final    Neutrophils Absolute 06/26/2025 6.60 (H)  1.60 - 5.50 x10*3/uL Final    Percent differential counts (%) should be interpreted in the context of the absolute cell counts (cells/uL).    Immature Granulocytes Absolute, Au* 06/26/2025 0.04  0.00 - 0.50 x10*3/uL Final    Lymphocytes Absolute 06/26/2025 1.20  0.80 - 3.00 x10*3/uL Final    Monocytes Absolute 06/26/2025 0.84 (H)  0.05 - 0.80 x10*3/uL Final    Eosinophils Absolute 06/26/2025 0.15  0.00 - 0.40 x10*3/uL Final    Basophils Absolute 06/26/2025 0.04  0.00 - 0.10 x10*3/uL Final    Glucose 06/26/2025 149 (H)  74 - 99 mg/dL Final    Sodium 06/26/2025 139  136 - 145 mmol/L Final    Potassium 06/26/2025 3.7  3.5 - 5.3 mmol/L Final    Chloride 06/26/2025 106  98 - 107 mmol/L Final    Bicarbonate 06/26/2025 26  21 - 32 mmol/L Final    Anion Gap 06/26/2025 11  10 - 20 mmol/L Final    Urea Nitrogen 06/26/2025 14  6 - 23 mg/dL Final    Creatinine 06/26/2025 0.79  0.50 - 1.05 mg/dL Final    eGFR 06/26/2025 79   >60 mL/min/1.73m*2 Final    Calculations of estimated GFR are performed using the 2021 CKD-EPI Study Refit equation without the race variable for the IDMS-Traceable creatinine methods.  https://jasn.asnjournals.org/content/early/2021/09/22/ASN.2988030856    Calcium 06/26/2025 8.5 (L)  8.6 - 10.3 mg/dL Final   Office Visit on 06/18/2025   Component Date Value Ref Range Status    POC BRE-COV-2 AG 06/18/2025 Presumptive negative test for SARS-CoV-2 (no antigen detected)  Presumptive negative test for SARS-CoV-2 (no antigen detected) Final    SOURCE 06/18/2025 see note   Final                                 SKIN LESION    HSV 1 DNA 06/18/2025 Not Detected  Not Detected Final    HSV 2 DNA 06/18/2025 Detected (A)  Not Detected Final    Comment:       This test was developed and its analytical performance  characteristics have been determined by [employer], [place]. It has  not been cleared or approved by the U.S. Food and Drug  Administration. This assay has been validated pursuant  to the CLIA regulations and is used for clinical  purposes.        Admission on 06/16/2025, Discharged on 06/16/2025   Component Date Value Ref Range Status    Ventricular Rate 06/16/2025 65  BPM Final    Atrial Rate 06/16/2025 65  BPM Final    [place] Interval 06/16/2025 128  ms Final    QRS Duration 06/16/2025 102  ms Final    QT Interval 06/16/2025 436  ms Final    QTC Calculation(Bazett) 06/16/2025 453  ms Final    P Axis 06/16/2025 -49  degrees Final    R Axis 06/16/2025 -32  degrees Final    T Axis 06/16/2025 -12  degrees Final    QRS Count 06/16/2025 10  beats Final    Q Onset 06/16/2025 210  ms Final    P Onset 06/16/2025 146  ms Final    P Offset 06/16/2025 196  ms Final    T Offset 06/16/2025 428  ms Final    QTC Fredericia 06/16/2025 447  ms Final    Glucose 06/16/2025 91  74 - 99 mg/dL Final    Sodium 06/16/2025 143  136 - 145 mmol/L Final    Potassium 06/16/2025 4.1  3.5 - 5.3 mmol/L Final    Chloride  06/16/2025 108 (H)  98 - 107 mmol/L Final    Bicarbonate 06/16/2025 27  21 - 32 mmol/L Final    Anion Gap 06/16/2025 12  10 - 20 mmol/L Final    Urea Nitrogen 06/16/2025 8  6 - 23 mg/dL Final    Creatinine 06/16/2025 0.60  0.50 - 1.05 mg/dL Final    eGFR 06/16/2025 >90  >60 mL/min/1.73m*2 Final    Calculations of estimated GFR are performed using the 2021 CKD-EPI Study Refit equation without the race variable for the IDMS-Traceable creatinine methods.  https://jasn.asnjournals.org/content/early/2021/09/22/ASN.0906986439    Calcium 06/16/2025 9.0  8.6 - 10.3 mg/dL Final    WBC 06/16/2025 5.9  4.4 - 11.3 x10*3/uL Final    nRBC 06/16/2025 0.0  0.0 - 0.0 /100 WBCs Final    RBC 06/16/2025 3.88 (L)  4.00 - 5.20 x10*6/uL Final    Hemoglobin 06/16/2025 13.0  12.0 - 16.0 g/dL Final    Hematocrit 06/16/2025 38.9  36.0 - 46.0 % Final    MCV 06/16/2025 100  80 - 100 fL Final    MCH 06/16/2025 33.5  26.0 - 34.0 pg Final    MCHC 06/16/2025 33.4  32.0 - 36.0 g/dL Final    RDW 06/16/2025 13.9  11.5 - 14.5 % Final    Platelets 06/16/2025 478 (H)  150 - 450 x10*3/uL Final        Assessment/Plan     Problem List Items Addressed This Visit       Anxiety    Cancer of abdominal esophagus (Multi)    Depression, major, single episode, mild    Papillary thyroid carcinoma    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)    Mixed hyperlipidemia     Other Visit Diagnoses         Hospital discharge follow-up          Pneumonia due to infectious organism, unspecified laterality, unspecified part of lung          Sepsis, due to unspecified organism, unspecified whether acute organ dysfunction present (Multi)          Medication management        Relevant Orders    Drug Screen, Urine With Reflex to Confirmation          Pneumonia and effusion  Seeing pulmonary soon  On levaquin  Still has some pain in side  Feels better    Sarah Chavez -be aware that any referrals discussed should be placed today or  "tests/labs ordered should result in prompt scheduling today.   If not done today-then a phone call for scheduling is expected in a timely manner(within 2 weeks).   If testing is to be done-a result should be available to patient within 2 weeks time unless otherwise specified.   You, the patient or caregiver, are responsible for making sure what was discussed is actually scheduled and completed.  If suboptimal understanding of results of tests or referral reason-a follow up appointment with me should be made.  If above does NOT occur-you are to connect with us for an explanation.    Follow up at next scheduled visit -as planned or directed today.  Sooner if new or unresolved issues of concern.    Sarah Chavez We know you have a choice for your health care, THANK YOU for choosing  and [facility].  We APPRECIATE YOU.  Sincerely,   Sima Miner MD   (dr. Callahan)      Patient: Sarah Chavez  : 1950  PCP: Sima Miner MD  MRN: [id number]  Program: Transitional Care Management  Status: Enrolled  Effective Dates: 2025 - present  Responsible Staff: Mary Harvey RN  Social Drivers to be Addressed: No information to display         Sarah Chavez is a 74 y.o. female presenting today for follow-up after being discharged from the hospital 4 days ago. The main problem requiring admission was pneumonia. The discharge summary and/or Transitional Care Management documentation was reviewed. Medication reconciliation was performed as indicated via the \"Ney as Reviewed\" timestamp.     Sarah Chavez was contacted by Transitional Care Management services two days after her discharge. This encounter and supporting documentation was reviewed.                The complexity of medical decision making for this patient's transitional care is moderate.    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.2    Mixed hyperlipidemia E78.2     Other Visit Diagnoses         Codes      Hospital discharge follow-up     Z09      Pneumonia due to infectious organism, unspecified laterality, unspecified part of lung     J18.9      Sepsis, due to unspecified organism, unspecified whether acute organ dysfunction present (Multi)     A41.9      Medication management     Z79.899    Relevant Orders    Drug Screen, Urine With Reflex to Confirmation          Sunscreen needed  Seeing pulm  See me 2-3mo  Hsv d/w pt  And as above         [1]   Patient Active Problem List  Diagnosis    Actinic keratosis    Anxiety    Coronary artery calcification of native artery    Cancer of abdominal esophagus (Multi)    Candidal intertrigo    Cardiomegaly    Cervical paraspinal muscle spasm    Depression, major, single episode, mild    Dysuria    Elevated platelet count    Esophageal adenocarcinoma (Multi)    GERD (gastroesophageal reflux disease)    Globus sensation    Hematuria    Hyperglycemia    Intraepithelial neoplasm of pancreas    Mild aortic insufficiency    Mild diastolic dysfunction    Nausea and/or vomiting    Odynophagia    Papillary thyroid carcinoma    Pleural effusion    Post-splenectomy    Pulmonary embolism    Rheumatoid arthritis    Seasonal allergies    Sleep difficulties    Goiter    Thyroid nodule    Trigger point of neck    Urinary tract infection    Uses feeding tube    Overweight with body mass index (BMI) of 25 to 25.9 in adult    Crohn's disease (Multi)    Encounter for Medicare annual wellness exam    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)    Gastrostomy status (Multi)    Protein-calorie malnutrition, unspecified severity (Multi)    Esophageal dysphagia    Cervical radiculopathy    Mixed  hyperlipidemia    Cardiovascular stress test abnormal    Pneumonia of right lower lobe due to infectious organism   [2]   Past Surgical History:  Procedure Laterality Date    APPENDECTOMY  1981    CARDIAC CATHETERIZATION Left 6/16/2025    Procedure: LHC, With LV;  Surgeon: Tabatha Garsia MD;  Location: [facility];  Service: Cardiovascular;  Laterality: Left;    CHOLECYSTECTOMY  1994    COLONOSCOPY  09/2019    early adenomatous glandular changes-due 2024--declines-unable to tolerate prep    CYSTOSCOPY      ESOPHAGUS SURGERY      HYSTERECTOMY  1987    STEVO USO    LITHOTRIPSY      PANCREAS SURGERY  2019    30% of pancreas removed    SHOULDER SURGERY Right 1977    1 pin    SKIN GRAFT Left     Left Foot    SPLENECTOMY, TOTAL      THYROIDECTOMY, PARTIAL

## 2025-07-06 LAB
1OH-MIDAZOLAM UR-MCNC: NEGATIVE NG/ML
7AMINOCLONAZEPAM UR-MCNC: NEGATIVE NG/ML
A-OH ALPRAZ UR-MCNC: 130 NG/ML
A-OH-TRIAZOLAM UR-MCNC: NEGATIVE NG/ML
AMPHETAMINES UR QL: NEGATIVE NG/ML
BARBITURATES UR QL: NEGATIVE NG/ML
BENZODIAZ UR QL: POSITIVE NG/ML
BZE UR QL: NEGATIVE NG/ML
CODEINE UR-MCNC: NEGATIVE NG/ML
CREAT UR-MCNC: 131 MG/DL
DRUG SCREEN COMMENT UR-IMP: ABNORMAL
DRUG SCREEN COMMENT UR-IMP: ABNORMAL
FENTANYL UR QL SCN: NEGATIVE NG/ML
HYDROCODONE UR-MCNC: NEGATIVE NG/ML
HYDROMORPHONE UR-MCNC: NEGATIVE NG/ML
LORAZEPAM UR-MCNC: NEGATIVE NG/ML
METHADONE UR QL: NEGATIVE NG/ML
MORPHINE UR-MCNC: NEGATIVE NG/ML
NORDIAZEPAM UR-MCNC: NEGATIVE NG/ML
NORHYDROCODONE UR CFM-MCNC: NEGATIVE NG/ML
OH-ETHYLFLURAZ UR-MCNC: NEGATIVE NG/ML
OPIATES UR QL: ABNORMAL NG/ML
OXAZEPAM UR-MCNC: NEGATIVE NG/ML
OXIDANTS UR QL: NEGATIVE MCG/ML
OXYCODONE UR QL: NEGATIVE NG/ML
PCP UR QL: NEGATIVE NG/ML
PH UR: 5.6 [PH] (ref 4.5–9)
QUEST NOTES AND COMMENTS: ABNORMAL
TEMAZEPAM UR-MCNC: NEGATIVE NG/ML
THC UR QL: NEGATIVE NG/ML

## 2025-07-08 ENCOUNTER — APPOINTMENT (OUTPATIENT)
Dept: CARDIOLOGY | Facility: CLINIC | Age: 75
End: 2025-07-08
Payer: MEDICARE

## 2025-07-08 ENCOUNTER — HOSPITAL ENCOUNTER (OUTPATIENT)
Dept: RADIOLOGY | Facility: HOSPITAL | Age: 75
Discharge: HOME | End: 2025-07-08
Payer: MEDICARE

## 2025-07-08 VITALS
SYSTOLIC BLOOD PRESSURE: 118 MMHG | DIASTOLIC BLOOD PRESSURE: 60 MMHG | HEART RATE: 71 BPM | BODY MASS INDEX: 22.26 KG/M2 | HEIGHT: 62 IN | WEIGHT: 121 LBS

## 2025-07-08 VITALS — BODY MASS INDEX: 22.13 KG/M2 | WEIGHT: 121 LBS

## 2025-07-08 DIAGNOSIS — E78.2 MIXED HYPERLIPIDEMIA: ICD-10-CM

## 2025-07-08 DIAGNOSIS — R07.89 OTHER CHEST PAIN: ICD-10-CM

## 2025-07-08 DIAGNOSIS — Z12.31 ENCOUNTER FOR SCREENING MAMMOGRAM FOR BREAST CANCER: ICD-10-CM

## 2025-07-08 PROCEDURE — 99214 OFFICE O/P EST MOD 30 MIN: CPT | Performed by: INTERNAL MEDICINE

## 2025-07-08 PROCEDURE — 77063 BREAST TOMOSYNTHESIS BI: CPT | Performed by: RADIOLOGY

## 2025-07-08 PROCEDURE — 1159F MED LIST DOCD IN RCRD: CPT | Performed by: INTERNAL MEDICINE

## 2025-07-08 PROCEDURE — G2211 COMPLEX E/M VISIT ADD ON: HCPCS | Performed by: INTERNAL MEDICINE

## 2025-07-08 PROCEDURE — 77067 SCR MAMMO BI INCL CAD: CPT | Performed by: RADIOLOGY

## 2025-07-08 PROCEDURE — 1111F DSCHRG MED/CURRENT MED MERGE: CPT | Performed by: INTERNAL MEDICINE

## 2025-07-08 PROCEDURE — 77067 SCR MAMMO BI INCL CAD: CPT

## 2025-07-08 PROCEDURE — 3008F BODY MASS INDEX DOCD: CPT | Performed by: INTERNAL MEDICINE

## 2025-07-08 NOTE — PROGRESS NOTES
Patient:  Sarah Chavez  YOB: 1950  MRN: [id number]       Impression/Plan:     Diagnoses and all orders for this visit:  Mixed hyperlipidemia  -    Tolerating current statin well without adverse effect would continue  Other chest pain  -    No anginal pain.  Although CT scan had suggested coronary calcium and stress test was concerning for lateral ischemia angiography shows normal coronary arteries.  I reviewed with her that this means there is no significant narrowing but plaque could still be in the wall of the artery and I think it reasonable to continue statin.  I do not believe further cardiac testing is necessary at this point in time she has no angina to suggest small vessel disease either.  Will plan to reassess on an as-needed basis      Chief Complaint/Active Symptoms:      Chief Complaint   Patient presents with    Follow-up     1 month follow up for Coronary artery calcification of native artery, hyperlipidemia.  [facility] 6/24 / 6/26/25 for second bout of Pneumonia.        Sarah Chavez is a 74 y.o. female who presents with  hyperlipidemia coronary artery disease based on coronary calcium.  She has a history of esophageal cancer, adenocarcinoma with treatment completed August 2023.       5/13/2025 test/Lexiscan perfusion  Abnormal Lexiscan Myoview cardiac perfusion stress test.  Moderate lateral wall myocardial ischemia by perfusion imaging.  No evidence of myocardial infarction by perfusion imaging.  Normal left ventricular systolic function.  Left ventricular ejection fraction 64 %.  Normal LV systolic function with lateral wall ischemia suggesting  proximal circumflex disease. Overall systolic function is normal this  would be considered moderate risk study. Very low functional capacity  for age at just 2.3 M ET though no chest pain or dyspnea stopped  secondary to fatigue.    6/16/2025 coronary angiography  1. Left Main Coronary Artery: This artery is normal.   2. Left Anterior  Descending Artery: This artery is normal.   3. Circumflex Coronary Artery: normal.   4. Right Coronary Artery: is normal.   5. The Left Ventricular Ejection Fraction is 55%.    I had seen her last 6/9/2025 because of coronary calcium abnormal stress test she was referred for coronary angiography with results as noted above    She has had no chest tightness or shortness of breath of late.  Musculoskeletal chest pain once in a while but no symptoms to suggest angina her breathing is stable                 Review of Systems: Unremarkable except as noted above    Meds     Current Outpatient Medications   Medication Instructions    albuterol 90 mcg/actuation inhaler 2 puffs, inhalation, Every 6 hours PRN    ALPRAZolam (XANAX) 0.25 mg, oral, 3 times daily PRN    aspirin 81 mg, Daily    cholecalciferol (VITAMIN D-3) 125 mcg, Daily    ferrous sulfate 325 (65 Fe) MG tablet 65 mg of elemental iron, Daily with breakfast    gabapentin (NEURONTIN) 100 mg, oral, Nightly, Can double dose after 3-4 day if no side effects    ketoconazole (NIZOral) 2 % cream Topical, 2 times daily    melatonin 5 mg capsule 1 capsule, Nightly    omega-3 (Fish Oil) 60- mg capsule 500 mg, Daily    omeprazole (PRILOSEC) 40 mg, oral, Daily    pitavastatin calcium 1 mg, oral, Daily    potassium gluconate 595 mg (99 mg) tablet extended release 1 tablet, Daily    sodium-potassium bicarbonate (Dahlia-Satin Gold) 344-1,050-1,000 mg dispersible tablet 1 tablet, Daily PRN    triamcinolone (Kenalog) 0.1 % ointment Topical, 2 times daily PRN        Allergies   Allergies[1]      Annotated Problems     Specialty Problems          Cardiology Problems    Cardiomegaly    Coronary artery calcification of native artery    Mild aortic insufficiency    Mild diastolic dysfunction    Pulmonary embolism    Cardiovascular stress test abnormal    Mixed hyperlipidemia        Problem List     Patient Active Problem List    Diagnosis Date Noted    Pneumonia of right lower  "lobe due to infectious organism 06/24/2025    Mixed hyperlipidemia 06/09/2025    Cardiovascular stress test abnormal 06/09/2025    Cervical radiculopathy 03/28/2025    Esophageal dysphagia 07/12/2024    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    Gastrostomy status (Multi) 06/28/2023    Protein-calorie malnutrition, unspecified severity (Multi) 06/28/2023    Crohn's disease (Multi) 03/20/2023    Encounter for Medicare annual wellness exam 03/20/2023    Actinic keratosis 02/04/2023    Anxiety 02/04/2023    Coronary artery calcification of native artery 02/04/2023    Cancer of abdominal esophagus (Multi) 02/04/2023    Candidal intertrigo 02/04/2023    Cardiomegaly 02/04/2023    Cervical paraspinal muscle spasm 02/04/2023    Depression, major, single episode, mild 02/04/2023    Dysuria 02/04/2023    Elevated platelet count 02/04/2023    Esophageal adenocarcinoma (Multi) 02/04/2023    GERD (gastroesophageal reflux disease) 02/04/2023    Globus sensation 02/04/2023    Hematuria 02/04/2023    Hyperglycemia 02/04/2023    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    Mild aortic insufficiency 02/04/2023    Mild diastolic dysfunction 02/04/2023    Nausea and/or vomiting 02/04/2023    Odynophagia 02/04/2023    Papillary thyroid carcinoma 02/04/2023    Pleural effusion 02/04/2023    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    Pulmonary embolism 02/04/2023    Rheumatoid arthritis 02/04/2023    Seasonal allergies 02/04/2023    Sleep difficulties 02/04/2023    Goiter 02/04/2023    Thyroid nodule 02/04/2023    Trigger point of neck 02/04/2023    Urinary tract infection 02/04/2023    Uses feeding tube 02/04/2023    Overweight with body mass index (BMI) of 25 to 25.9 in adult 02/04/2023       Objective:     Vitals:    07/08/25 0958   BP: 118/60   BP Location: Left arm   Patient Position: Sitting   Pulse: 71   Weight: 54.9 kg (121 lb)   Height: 1.575 m (5' 2\")      Wt Readings from Last 4 " Encounters:   07/08/25 54.9 kg (121 lb)   06/30/25 53.5 kg (118 lb)   06/24/25 54 kg (119 lb)   06/18/25 54.4 kg (120 lb)           LAB:     Lab Results   Component Value Date    WBC 8.9 06/26/2025    HGB 12.0 06/26/2025    HCT 37.2 06/26/2025     06/26/2025    CHOL 231 (H) 06/13/2024    TRIG 98 06/13/2024    HDL 60.2 06/13/2024    ALT 13 06/24/2025    AST 16 06/24/2025     06/26/2025    K 3.7 06/26/2025     06/26/2025    CREATININE 0.79 06/26/2025    BUN 14 06/26/2025    CO2 26 06/26/2025    TSH 3.15 03/13/2024    INR 1.1 06/24/2025    HGBA1C 5.3 06/13/2024         Physical Exam     General Appearance: alert and oriented to person, place and time, in no acute distress  Cardiovascular: normal rate, regular rhythm, normal S1 and S2, no murmurs, rubs, clicks, or gallops,  no JVD  Pulmonary/Chest: clear to auscultation bilaterally- no wheezes, rales or rhonchi, normal air movement, no respiratory distress  Abdomen: soft, non-tender, non-distended, normal bowel sounds, no masses   Extremities: no cyanosis, clubbing or edema  Skin: warm and dry, no rash or erythema  Eyes: EOMI  Neck: supple and non-tender without mass, no thyromegaly   Neurological: alert, oriented, normal speech, no focal findings or movement disorder noted        Provider attestation-scribe documentation  Any medical record entries made by the scribe were at my discretion and personally dictated by me.  I have reviewed the chart and agree that the record accurately reflects my personal performance of the history, physical exam, discussion and plan.                 [1]   Allergies  Allergen Reactions    Atorvastatin Other     LEG CRAMPS    Rosuvastatin Other     LEG CRAMPS    Statins-Hmg-Coa Reductase Inhibitors Myalgia     Specifically crestor     Vancomycin Rash     Red, itchy rash

## 2025-07-10 ENCOUNTER — PATIENT OUTREACH (OUTPATIENT)
Dept: PRIMARY CARE | Facility: CLINIC | Age: 75
End: 2025-07-10
Payer: MEDICARE

## 2025-07-11 ENCOUNTER — HOSPITAL ENCOUNTER (OUTPATIENT)
Dept: RADIOLOGY | Facility: EXTERNAL LOCATION | Age: 75
Discharge: HOME | End: 2025-07-11

## 2025-07-17 ENCOUNTER — TELEPHONE (OUTPATIENT)
Dept: HEMATOLOGY/ONCOLOGY | Facility: CLINIC | Age: 75
End: 2025-07-17
Payer: MEDICARE

## 2025-07-17 DIAGNOSIS — C15.9 ESOPHAGEAL ADENOCARCINOMA (MULTI): ICD-10-CM

## 2025-07-17 NOTE — TELEPHONE ENCOUNTER
Spoke with the patient. Asking for labs to be ordered for [facility] lab- she is going tomorrow. Explained I could for CBCD/CMP; however patient would still need office lab draw for signatera. States she will come early for her FUV with Dr. Lau to complete this test     Lab ordered placed in [facility]

## 2025-07-18 ENCOUNTER — LAB (OUTPATIENT)
Dept: LAB | Facility: HOSPITAL | Age: 75
End: 2025-07-18
Payer: MEDICARE

## 2025-07-18 DIAGNOSIS — C15.9 MALIGNANT NEOPLASM OF ESOPHAGUS, UNSPECIFIED: Primary | ICD-10-CM

## 2025-07-18 LAB
ALBUMIN SERPL BCP-MCNC: 3.9 G/DL (ref 3.4–5)
ALP SERPL-CCNC: 76 U/L (ref 33–136)
ALT SERPL W P-5'-P-CCNC: 11 U/L (ref 7–45)
ANION GAP SERPL CALC-SCNC: 10 MMOL/L (ref 10–20)
AST SERPL W P-5'-P-CCNC: 14 U/L (ref 9–39)
ATRIAL RATE: 65 BPM
BASOPHILS # BLD AUTO: 0.08 X10*3/UL (ref 0–0.1)
BASOPHILS NFR BLD AUTO: 1.7 %
BILIRUB SERPL-MCNC: 0.7 MG/DL (ref 0–1.2)
BUN SERPL-MCNC: 15 MG/DL (ref 6–23)
CALCIUM SERPL-MCNC: 9.1 MG/DL (ref 8.6–10.3)
CHLORIDE SERPL-SCNC: 105 MMOL/L (ref 98–107)
CO2 SERPL-SCNC: 30 MMOL/L (ref 21–32)
CREAT SERPL-MCNC: 0.64 MG/DL (ref 0.5–1.05)
EGFRCR SERPLBLD CKD-EPI 2021: >90 ML/MIN/1.73M*2
EOSINOPHIL # BLD AUTO: 0.33 X10*3/UL (ref 0–0.4)
EOSINOPHIL NFR BLD AUTO: 7.1 %
ERYTHROCYTE [DISTWIDTH] IN BLOOD BY AUTOMATED COUNT: 15.5 % (ref 11.5–14.5)
GLUCOSE SERPL-MCNC: 106 MG/DL (ref 74–99)
HCT VFR BLD AUTO: 41.8 % (ref 36–46)
HGB BLD-MCNC: 13.5 G/DL (ref 12–16)
IMM GRANULOCYTES # BLD AUTO: 0.01 X10*3/UL (ref 0–0.5)
IMM GRANULOCYTES NFR BLD AUTO: 0.2 % (ref 0–0.9)
LYMPHOCYTES # BLD AUTO: 2.19 X10*3/UL (ref 0.8–3)
LYMPHOCYTES NFR BLD AUTO: 46.9 %
MCH RBC QN AUTO: 33.7 PG (ref 26–34)
MCHC RBC AUTO-ENTMCNC: 32.3 G/DL (ref 32–36)
MCV RBC AUTO: 104 FL (ref 80–100)
MONOCYTES # BLD AUTO: 0.63 X10*3/UL (ref 0.05–0.8)
MONOCYTES NFR BLD AUTO: 13.5 %
NEUTROPHILS # BLD AUTO: 1.43 X10*3/UL (ref 1.6–5.5)
NEUTROPHILS NFR BLD AUTO: 30.6 %
NRBC BLD-RTO: 0 /100 WBCS (ref 0–0)
P AXIS: -43 DEGREES
P OFFSET: 198 MS
P ONSET: 149 MS
PLATELET # BLD AUTO: 256 X10*3/UL (ref 150–450)
POTASSIUM SERPL-SCNC: 4.5 MMOL/L (ref 3.5–5.3)
PR INTERVAL: 116 MS
PROT SERPL-MCNC: 6.1 G/DL (ref 6.4–8.2)
Q ONSET: 207 MS
QRS COUNT: 11 BEATS
QRS DURATION: 112 MS
QT INTERVAL: 450 MS
QTC CALCULATION(BAZETT): 468 MS
QTC FREDERICIA: 462 MS
R AXIS: -27 DEGREES
RBC # BLD AUTO: 4.01 X10*6/UL (ref 4–5.2)
SODIUM SERPL-SCNC: 140 MMOL/L (ref 136–145)
T AXIS: -30 DEGREES
T OFFSET: 432 MS
VENTRICULAR RATE: 65 BPM
WBC # BLD AUTO: 4.7 X10*3/UL (ref 4.4–11.3)

## 2025-07-18 PROCEDURE — 85025 COMPLETE CBC W/AUTO DIFF WBC: CPT

## 2025-07-18 PROCEDURE — 80053 COMPREHEN METABOLIC PANEL: CPT

## 2025-07-18 PROCEDURE — 36415 COLL VENOUS BLD VENIPUNCTURE: CPT

## 2025-07-21 ENCOUNTER — ANCILLARY PROCEDURE (OUTPATIENT)
Facility: HOSPITAL | Age: 75
End: 2025-07-21
Payer: MEDICARE

## 2025-07-21 ENCOUNTER — APPOINTMENT (OUTPATIENT)
Dept: RADIOLOGY | Facility: CLINIC | Age: 75
End: 2025-07-21
Payer: MEDICARE

## 2025-07-21 DIAGNOSIS — C16.0 ADENOCARCINOMA OF GASTROESOPHAGEAL JUNCTION (MULTI): Primary | ICD-10-CM

## 2025-07-21 PROCEDURE — 71260 CT THORAX DX C+: CPT | Performed by: RADIOLOGY

## 2025-07-21 PROCEDURE — 2550000001 HC RX 255 CONTRASTS: Performed by: INTERNAL MEDICINE

## 2025-07-21 PROCEDURE — 74177 CT ABD & PELVIS W/CONTRAST: CPT | Performed by: RADIOLOGY

## 2025-07-21 PROCEDURE — 74177 CT ABD & PELVIS W/CONTRAST: CPT

## 2025-07-21 RX ADMIN — IOHEXOL 75 ML: 350 INJECTION, SOLUTION INTRAVENOUS at 10:45

## 2025-07-23 ENCOUNTER — APPOINTMENT (OUTPATIENT)
Facility: HOSPITAL | Age: 75
End: 2025-07-23
Payer: MEDICARE

## 2025-07-29 ENCOUNTER — TELEMEDICINE (OUTPATIENT)
Dept: SURGERY | Facility: CLINIC | Age: 75
End: 2025-07-29
Payer: MEDICARE

## 2025-07-29 DIAGNOSIS — C15.5 CANCER OF ABDOMINAL ESOPHAGUS (MULTI): Primary | ICD-10-CM

## 2025-07-29 NOTE — PROGRESS NOTES
Patient ID: Sarah Chavez is a 74 y.o. female.    Subjective    HPI  Ms. Saarh Chavez is a 74 y.o. female who presents for follow-up for esophageal cancer. CT scan from 7/21/25 shows no new evidence of progression.     Today, she reports she did have pneumonia twice during the summer, but is doing better now. She feels she is getting back to her baseline. She denies diarrhea, nausea, constipation, vomiting.    Patient's past medical history, surgical history, family history and social history reviewed.    Review of Systems:   Review of Systems:    Positive per HPI, otherwise negative.       Objective    /82 (BP Location: Right arm, Patient Position: Sitting)   Pulse 54   Temp 35.8 °C (96.4 °F) (Temporal)   Resp 16   Wt 54.9 kg (120 lb 14.8 oz)   LMP 01/01/1987 (Approximate)   SpO2 95%   BMI 22.12 kg/m²     Physical Exam  Gen: appears well in clinic, NAD  HEENT: atraumatic head, normocephalic, EOMI, conjunctiva normal  LUNG: no increased WOB, CTAB  CV: No JVD. RRR  GI: soft, NT, ND  LE: no LE edema  Skin: no obvious rashes or lesions on visible skin  Neuro: interactive, no focal deficits noted  Psych: normal mood and affect    Performance Status:  Symptomatic; fully ambulatory    Labs/Imaging/Pathology: personally reviewed reports and images in Epic electronic medical record system. Pertinent results as it related to the plan represented in below in assessment and plan.     Assessment/Plan   1. GE junction adenocarcinoma, ypT2N1  - Initially presented to GI  1/10/2022 with epigastric pain and dysphasia, weight loss. Patient  underwent an EGD on 1/21/2022 with Dr. Lora and was found to have a malignant esophageal tumor at the GE junction . An upper EUS with Dr. Delgadillo  on 2/3/2022 found to have a malignant tumor in the lower esophagus, at the GE  junction and extending into the cardia. Pathology returned as adenocarcinoma stage T3N0 by endosonographic criteria.   - 3/2/2022 - Started FOLFOX,  completed 3 cycles, C2 onward 80% paclitaxel due to neuropathy.  - 4/8/2022 - PET/CT per [facility] 31199 with great response.   - 4/18/2022 - Started on RT with FOLFOX on 4/19/2022.  - C5 skipped due to thrombocytopenia.  - 5/11/2022 - Last cycle of FOLFOX.  - 6/27/2022 - Post CRT PET with good response.  - 7/20/2022 Status-post esophagectomy, final pathology revealed an 0.5 cm residual foci of poorly differentiated adenocarcinoma along with 1/15 lymph nodes with metastatic  carcinoma. Final stage ypT2 N1.  - Discussed the role of adjuvant nivolumab per Checkmate 577 which showed an increase in disease-free survival when nivolumab was given every 2 weeks for 8 treatments followed by every 4 weeks to complete the year.   - Reviewed nivolumab and side effects. Patient was agreeable to treatment and consent signed.   - 8/2/23 completed one year of adjuvant nivolumab     8/2/23:  - No contraindications to proceed with last treatment of nivolumab today. She has completed 1 year of treatment.   - We discussed that she already has her next scans scheduled through Surgery.  - We discussed that she would like to keep her port in due to having poor IV access.  - We will plan for 6-week port flush and follow-up with me in 12 weeks.   - She will call us with any worsening symptoms.   - Her major compliant continues to be poor appetite and trouble eating. We will have repeat labs with immunotherapy in 6 weeks and I will see her again in 12 weeks.   - RTC with me in 12 weeks.      11/8/23:  - We will plan for a port flush today and blood work.   - She is planned for an ultrasound and follow-up with Dr. Poon tomorrow.  - She did have a new rash on her toes concerning for bullous pemphigoid versus a hypersensitivity that resolved with steroids. We discussed if those symptoms come back to call us and we can get her in with Dermatology   as she may need steroids for a longer period of time.  - We will plan to check an ACTH and TSH  with labs today. If unremarkable, we will plan for follow-up in 3 months with a CT chest prior along with CBC, CMP, ACTH and TSH.   - We will plan for another Signatera in 3 months.   - We will plan for a port flush in 8 weeks.  - Due to traveling her follow up and port flushes will be stretched out to allow for travel to [place] in February 2024.   - RTC with me in March 2024.       3/21/24:  - Reviewed most recent blood work and CT scans. No evidence of recurrence.   - At this time, she would like to have her port removed which we will help arrange.   - Otherwise, we will plan for follow-up in 3 months with labs and likely plan for imaging in 6 months with CT chest.   - RTC with Hector in 3 months and with me in 6 months.      6/21/24:  - Patient is describing some satiety and trouble with eating.   - We discussed he plan for a CT of the chest to rule out progression or recurrence of disease.  - Given she is having trouble swallowing and some neuropathy and nerve changes in her right shoulder and neck we will plan for a CT of the neck as well  - will reach out to Dr Lora regarding repeat EGD with her new dysphagia  - RTC in 2 weeks with phone visit.     7/11/24: Telephone visit  - CT chest without new findings to suggest recurrence  - dicussed with Dr Lora who will help expedite EGD for dysphagia  - she will call with worsening symptoms  - RTC In 3 mo or sooner if new findings on EGD     11/14/24:  - Labs unremarkable.  - She continues to feel well.  - Recent EGD 7/23/24 with no evidence of recurrence.  - We will plan for repeat scans at the end of January before she leaves.   - She is having some worsening Right shoulder pain that I suspect is neuropathy.  - We will plan to start Gabapentin.  - Follow-up in January after scans.    1/30/25:  - No evidence of recurrence on most recent scans.   - She otherwise feels well with good energy and appetite. Her appetite has increased.   - we will continue  surveillance.  - Cmp and signatera from today pending  - Repeat labs and CT scans in 6 months  - She will be over 3 years out, and we will likely consider once a year moving forward.   - RTC in 6 months    7/31/25:   - Recent CT scan unremarkable for progression   - She has had 3 bouts of pneumonia, followed by pulmonology at [facility], Dr Sarah   - We discussed there is some scarring in the lung, likely related to pneumonia   - Lung exam unremarkable   - RTC in 1 year with CT scans          Reviewed ongoing medical problems and how they relate to her GE junction adenocarcinoma, will continue long term monitoring.    RTC in 1 year  This note has been transcribed using a medical scribe and there is a possibility of unintentional typing misprints    Diagnoses and all orders for this visit:  Adenocarcinoma of gastroesophageal junction (Multi)  -     Clinic Appointment Request  -     Clinic Appointment Request; Future  -     CBC and Auto Differential; Future  -     Comprehensive metabolic panel; Future  -     signatera; Other-indicate in comment; unknown - Miscellaneous Test; Future      Renay Lau MD  Hematology/Oncology  [facility]      Scribe Attestation  By signing my name below, Sharon ROMERO Scribe, attest that this documentation has been prepared under the direction and in the presence of Renay Lau MD.    Provider Attestation  Renay ROMERO MD, personally performed the services described in the documentation as scribed by Sharon Duque, in my presence, and confirm it is both accurate and complete.  Time Spent  Prep time on day of patient encounter: 5 minutes  Time spent directly with patient, family or caregiver: 15 minutes  Additional Time Spent on Patient Care Activities: 5 minutes  Documentation Time: 5 minutes  Other Time Spent: 0 minutes  Total: 30 minutes

## 2025-07-31 ENCOUNTER — LAB (OUTPATIENT)
Dept: LAB | Facility: CLINIC | Age: 75
End: 2025-07-31
Payer: MEDICARE

## 2025-07-31 ENCOUNTER — OFFICE VISIT (OUTPATIENT)
Dept: HEMATOLOGY/ONCOLOGY | Facility: CLINIC | Age: 75
End: 2025-07-31
Payer: MEDICARE

## 2025-07-31 VITALS
HEART RATE: 54 BPM | SYSTOLIC BLOOD PRESSURE: 147 MMHG | DIASTOLIC BLOOD PRESSURE: 82 MMHG | TEMPERATURE: 96.4 F | WEIGHT: 120.92 LBS | OXYGEN SATURATION: 95 % | RESPIRATION RATE: 16 BRPM | BODY MASS INDEX: 22.12 KG/M2

## 2025-07-31 DIAGNOSIS — C16.0 ADENOCARCINOMA OF GASTROESOPHAGEAL JUNCTION (MULTI): ICD-10-CM

## 2025-07-31 LAB
ALBUMIN SERPL BCP-MCNC: 3.8 G/DL (ref 3.4–5)
ALP SERPL-CCNC: 79 U/L (ref 33–136)
ALT SERPL W P-5'-P-CCNC: 11 U/L (ref 7–45)
ANION GAP SERPL CALC-SCNC: 11 MMOL/L (ref 10–20)
AST SERPL W P-5'-P-CCNC: 17 U/L (ref 9–39)
BASOPHILS # BLD AUTO: 0.05 X10*3/UL (ref 0–0.1)
BASOPHILS NFR BLD AUTO: 0.8 %
BILIRUB SERPL-MCNC: 0.7 MG/DL (ref 0–1.2)
BUN SERPL-MCNC: 10 MG/DL (ref 6–23)
CALCIUM SERPL-MCNC: 9.1 MG/DL (ref 8.6–10.3)
CHLORIDE SERPL-SCNC: 103 MMOL/L (ref 98–107)
CO2 SERPL-SCNC: 32 MMOL/L (ref 21–32)
CREAT SERPL-MCNC: 0.68 MG/DL (ref 0.5–1.05)
EGFRCR SERPLBLD CKD-EPI 2021: >90 ML/MIN/1.73M*2
EOSINOPHIL # BLD AUTO: 0.25 X10*3/UL (ref 0–0.4)
EOSINOPHIL NFR BLD AUTO: 4 %
ERYTHROCYTE [DISTWIDTH] IN BLOOD BY AUTOMATED COUNT: 15.4 % (ref 11.5–14.5)
GLUCOSE SERPL-MCNC: 106 MG/DL (ref 74–99)
HCT VFR BLD AUTO: 42.9 % (ref 36–46)
HGB BLD-MCNC: 13.8 G/DL (ref 12–16)
IMM GRANULOCYTES # BLD AUTO: 0.01 X10*3/UL (ref 0–0.5)
IMM GRANULOCYTES NFR BLD AUTO: 0.2 % (ref 0–0.9)
LYMPHOCYTES # BLD AUTO: 2.14 X10*3/UL (ref 0.8–3)
LYMPHOCYTES NFR BLD AUTO: 34.6 %
MCH RBC QN AUTO: 33.4 PG (ref 26–34)
MCHC RBC AUTO-ENTMCNC: 32.2 G/DL (ref 32–36)
MCV RBC AUTO: 104 FL (ref 80–100)
MONOCYTES # BLD AUTO: 0.65 X10*3/UL (ref 0.05–0.8)
MONOCYTES NFR BLD AUTO: 10.5 %
NEUTROPHILS # BLD AUTO: 3.09 X10*3/UL (ref 1.6–5.5)
NEUTROPHILS NFR BLD AUTO: 49.9 %
NRBC BLD-RTO: ABNORMAL /100{WBCS}
PLATELET # BLD AUTO: 202 X10*3/UL (ref 150–450)
POTASSIUM SERPL-SCNC: 3.8 MMOL/L (ref 3.5–5.3)
PROT SERPL-MCNC: 6.1 G/DL (ref 6.4–8.2)
RBC # BLD AUTO: 4.13 X10*6/UL (ref 4–5.2)
SODIUM SERPL-SCNC: 142 MMOL/L (ref 136–145)
WBC # BLD AUTO: 6.2 X10*3/UL (ref 4.4–11.3)

## 2025-07-31 PROCEDURE — 36415 COLL VENOUS BLD VENIPUNCTURE: CPT

## 2025-07-31 PROCEDURE — 1159F MED LIST DOCD IN RCRD: CPT | Performed by: INTERNAL MEDICINE

## 2025-07-31 PROCEDURE — 89240 UNLISTED MISC PATH TEST: CPT

## 2025-07-31 PROCEDURE — 99214 OFFICE O/P EST MOD 30 MIN: CPT | Performed by: INTERNAL MEDICINE

## 2025-07-31 PROCEDURE — 1126F AMNT PAIN NOTED NONE PRSNT: CPT | Performed by: INTERNAL MEDICINE

## 2025-07-31 PROCEDURE — 80053 COMPREHEN METABOLIC PANEL: CPT

## 2025-07-31 PROCEDURE — 85025 COMPLETE CBC W/AUTO DIFF WBC: CPT

## 2025-07-31 ASSESSMENT — PAIN SCALES - GENERAL: PAINLEVEL_OUTOF10: 0-NO PAIN

## 2025-08-01 LAB — HOLD SPECIMEN: NORMAL

## 2025-08-05 NOTE — PROGRESS NOTES
C/C:  Follow up visit    Virtual or Telephone Consent    While technically available, the patient was unable or unwilling to consent to connect via audio/video telehealth technology; therefore, I performed this visit using a real-time audio only connection between Sarah Chavez & Sylvia Poon DO.  Verbal consent was requested and obtained from Sarah Chavez on this date, 07/29/25 for a telehealth visit and the patient's location was confirmed at the time of the visit.    History Of Present Illness  Sarah Chavez is a 74 y.o. female presenting for virtual follow up with surveillance imaging given her h/o esophageal cancer. She is now s/p minimally invasive Kobi Navdeep esophagectomy and jejunostomy tube placement on 7/20/2022. She completed 1 year of adjuvant Nivolumab with Dr. Lau     She was recently admitted to the hospital for pneumonia (RLL). Since then she has mostly recovered.  She denies any issues with dysphagia, no notable reflux if she is on her medications, and denies food regurgitation.      By way of review: patient was seen in January 2022 for odynophagia/dysphagia and GERD. She reports having intermittent heartburn, especially at night for the past 4-5 years but this became significantly worse at the end of last year around the time of the holidays. She had lost ~10 pounds since these symptoms started. She had minimal response to BID PPI therapy and she underwent an EGD in January with Dr. Lora which shoed a distal esophageal mass confirmed adenocarcinoma. She had a f/up EUS with Dr. Delgadillo showing at least a T3N0 disease. She is now s/p CROSS regiment neoadj chemoXRT which she finished 5/25. She had notable weight loss during her treatment but did not wish to pursue a feeding tube. Patient states she just started to feel better in terms of her appetite and energy level about 2 weeks ago. Weight has stabilized, no recent gain.      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 Of note she did develop a PE several months after this surgery        Past Medical History  She has a past medical history of Abdominal wall pain, Abnormal EKG, Anxiety, Bilateral kidney stones, Crohn's disease (Multi), Dysphagia, GERD (gastroesophageal reflux disease), History of blood transfusion, History of esophageal cancer, History of goiter, History of mammogram (07/05/2024), antineoplastic chemo (march 2022), chronic pancreatitis, Personal history of irradiation (march2022), Pulmonary embolism, Seasonal allergies, Thyrotoxicosis, and Wears reading eyeglasses.    Social History  She reports that she quit smoking about 12 years ago. Her smoking use included cigarettes. She has never been exposed to tobacco smoke. She has never used smokeless tobacco. She reports that she does not currently use alcohol. She reports that she does not use drugs.      Medications    Current Outpatient Medications:     albuterol 90 mcg/actuation inhaler, Inhale 2 puffs every 6 hours if needed for wheezing., Disp: 18 g, Rfl: 11    ALPRAZolam (Xanax) 0.25 mg tablet, Take 1 tablet (0.25 mg) by mouth 3 times a day as needed for anxiety or sleep., Disp: 40 tablet, Rfl: 2    aspirin 81 mg EC tablet, Take 1 tablet (81 mg) by mouth once daily., Disp: , Rfl:     cholecalciferol (Vitamin D-3) 125 MCG (5000 UT) capsule, Take 1 capsule (125 mcg) by mouth once daily., Disp: , Rfl:     ferrous sulfate 325 (65 Fe) MG tablet, Take 1 tablet by mouth once daily with breakfast., Disp: , Rfl:     gabapentin (Neurontin) 100 mg capsule, Take 1 capsule (100 mg) by mouth once daily at bedtime. Can double dose after 3-4 day if no side effects, Disp: 60 capsule, Rfl: 11    ketoconazole (NIZOral) 2 % cream, Apply topically 2 times a day., Disp: 60 g, Rfl: 3    melatonin 5 mg capsule, Take 1 capsule (5 mg) by mouth once daily at bedtime., Disp: , Rfl:     omega-3 (Fish Oil) 60- mg capsule, Take 1  capsule (500 mg) by mouth once daily., Disp: , Rfl:     omeprazole (PriLOSEC) 40 mg DR capsule, TAKE 1 CAPSULE BY MOUTH DAILY, Disp: 90 capsule, Rfl: 3    pitavastatin calcium 1 mg tablet, Take 1 mg by mouth once daily., Disp: 90 tablet, Rfl: 3    potassium gluconate 595 mg (99 mg) tablet extended release, Take 1 tablet by mouth 1 (one) time each day., Disp: , Rfl:     sodium-potassium bicarbonate ([place] Gold) 344-1,050-1,000 mg dispersible tablet, Take 1 tablet by mouth once daily as needed., Disp: , Rfl:     triamcinolone (Kenalog) 0.1 % ointment, Apply topically 2 times a day as needed for irritation or rash., Disp: 15 g, Rfl: 5    Allergies  Atorvastatin, Rosuvastatin, Statins-hmg-coa reductase inhibitors, and Vancomycin    Review of Systems:  Review of Systems   Constitutional: No fevers, chills, unexpected weight change  HENT: No sore throat, congestion, or nasal drainage  Eyes: No visual changes or eye itching  Respiratory:  No cough, worsening dyspnea, wheezing  Cardiac: No chest pain, palpitations, or lower extremity edema  Gastrointestinal: No nausea, vomiting, diarrhea. No abdominal pain  Genitourinary: No dysuria or hematuria  Musculoskeletal: No back pain. No significant myalgias or arthralgias  Neurologic: No headaches, dizziness, or seizures.  Hematologic: No east bleeding or bruising.  Psychiatric: No anxiety or depression.    Physical Exam:  Physical Exam  LMP 01/01/1987 (Approximate)   Vital signs stable  Constitutional:       General: Patient is not in acute distress.  Neurological:      General: No focal deficit present.      Mental Status: Patient is alert and oriented to person, place, and time.   Psychiatric:         Mood and Affect: Mood normal.       Relevant Results:     Pathology:  Accession #: [id number]            Pathologist:                   KRISHNA MEEK M.D.  Date of Procedure:    7/20/2022  Date Received:          7/20/2022  Date Reported           8/5/2022  Submitting  Physician:   SAGAR DOWNS DO  Location:                    [facility]  Other External #                                                                   FINAL DIAGNOSIS  A. DISTAL ESOPHAGUS AND PROXIMAL STOMACH, ESOPHAGOGASTRECTOMY:  -- RESIDUAL MICROSCOPIC FOCI OF POORLY DIFFERENTIATED ADENOCARCINOMA (LARGEST  FOCUS: 0.5 CM) OF THE ESOPHAGOGASTRIC JUNCTION, INVADING INTO THE MUSCULARIS  PROPRIA.  -- STATUS POST NEOADJUVANT THERAPY WITH PARTIAL TREATMENT RESPONSE (CAP TUMOR  REGRESSION SCORE: 2).  -- FOCAL LYMPHOVASCULAR INVASION IS IDENTIFIED.  -- PERINEURAL INVASION IS NOT IDENTIFIED.  -- ALL SURGICAL RESECTION MARGINS ARE NEGATIVE.  -- METASTATIC CARCINOMA IN ONE OF FIFTEEN LYMPH NODES (1/15).  -- PATHOLOGIC STAGE (AJCC 8TH EDITION): ypT2 N1 (see synoptic report).  -- STOMACH WITH MILD CHRONIC INACTIVE GASTRITIS; NO HELICOBACTER IS SEEN.    B. STOMACH, ADDITIONAL MARGINS, EXCISION:  -- FULL THICKNESS SEGMENT OF STOMACH WITH NO SIGNIFICANT ABNORMALITY.  -- NEGATIVE FOR TUMOR.    C. ESOPHAGUS AND STOMACH, ANASTOMOTIC DONUTS, EXCISION:    -- FULL THICKNESS SEGMENTS OF ESOPHAGUS AND STOMACH WITH NO SIGNIFICANT  ABNORMALITY.  -- NEGATIVE FOR TUMOR.    Comment: Immunohistochemical stains performed on block A17 demonstrate that the  tumor cells are focally positive for CDX2. Chromogranin is negative, while  INSM1 shows focal non-specific staining. RB1 shows preserved nuclear  expression. Mucicarmine shows no definite intracytoplasmic mucin in tumor  cells.                                                                                                                                             CASE SUMMARY REPORT       SPECIMEN  Procedure:      Esophagogastrectomy  TUMOR  Tumor Site:      Esophagogastric junction (EGJ)  Relationship of Tumor to Esophagogastric Junction:       Tumor midpoint is located at the esophagogastric junction  Histologic Type:      Adenocarcinoma  Histologic Grade:      G3, poorly  differentiated, undifferentiated  Tumor Size:      Greatest Dimension (Centimeters): 0.5 cm  Tumor Extent:      Invades muscularis propria  Treatment Effect:      Present, with residual cancer showing evident tumor  regression, but more than single cells or rare small groups of cancer cells  (partial response, score 2)  Lymphovascular Invasion:      Present  Perineural Invasion:      Not identified  MARGINS  Margin Status for Invasive Carcinoma:      All margins negative for invasive  carcinoma   Closest Margin(s) to Invasive Carcinoma:        Proximal   Distance from Invasive Carcinoma to Closest Margin  :          4.9 cm  Margin Status for Dysplasia and Intestinal Metaplasia:       All margins negative for dysplasia  REGIONAL LYMPH NODES  Regional Lymph Node Status:        Tumor present in regional lymph node(s)     Number of Lymph Nodes with Tumor:           1   Number of Lymph Nodes Examined:         15  PATHOLOGIC STAGE CLASSIFICATION (pTNM, AJCC 8th Edition)  TNM Descriptors:      y (post-treatment)  pT Category:      pT2  pN Category:      pN1     Imaging:    === 07/21/25 ===    CT CHEST ABDOMEN PELVIS W IV CONTRAST    - Impression -  Esophageal cancer restaging scan:  1. Postsurgical changes of esophagectomy with gastric pull-through  without evidence of locoregional recurrence. No evidence of new  metastatic disease in the chest, abdomen or pelvis.  2. When compared to the prior CT angio chest on 06/24/2025, there has  been interval resolution of the right pleural effusion and  consolidative opacities in the right lower lobe. There are residual  linear and bandlike opacities in the right lower lobe, which likely  represent atelectasis/scarring. There is a small left pleural  effusion/pleural thickening.    I personally reviewed the images/study and I agree with the findings  as stated by resident physician Dr. Rodger June . This study  was interpreted at [facility]  [place],  [place], [place].    MACRO:  None    Signed by: Michel Macdonald 7/22/2025 9:09 PM  Dictation workstation:   [id number]    CT Chest was personally reviewed     Assessment/Plan   Diagnoses and all orders for this visit:  Cancer of abdominal esophagus (Multi)         Sarah Chavez is a 74 y.o. female with distal esophageal adenocarcinoma s/p CROSS regimen, completed 5/25 and s/p minimally invasive Kobi Navdeep esophagectomy and jejunostomy tube placement on 7/20/2022. Her pathology did show ypT2N1 disease (negative margins, small residual tumor, 1/15 lymph nodes involved) and she completed 1 year adjuvant Nivolumab with Dr. Lau. Recent CT C/A/P shows ROJELIO. She is seeing Dr. Lau next week; if agreeable per Oncology will plan for repeat surveillance CT in 1 year.   Time spent: 12 min      Sylvia Poon, DO  Thoracic & Esophageal Surgery

## 2025-08-11 ENCOUNTER — APPOINTMENT (OUTPATIENT)
Dept: PRIMARY CARE | Facility: CLINIC | Age: 75
End: 2025-08-11
Payer: MEDICARE

## 2025-08-13 ENCOUNTER — PATIENT OUTREACH (OUTPATIENT)
Dept: PRIMARY CARE | Facility: CLINIC | Age: 75
End: 2025-08-13
Payer: MEDICARE

## 2025-08-13 ENCOUNTER — TELEPHONE (OUTPATIENT)
Dept: HEMATOLOGY/ONCOLOGY | Facility: CLINIC | Age: 75
End: 2025-08-13
Payer: MEDICARE

## 2025-08-13 DIAGNOSIS — C15.9 ESOPHAGEAL ADENOCARCINOMA (MULTI): ICD-10-CM

## 2025-08-20 DIAGNOSIS — F41.9 ANXIETY: ICD-10-CM

## 2025-08-20 RX ORDER — ALPRAZOLAM 0.25 MG/1
0.25 TABLET ORAL 3 TIMES DAILY PRN
Qty: 40 TABLET | Refills: 2 | Status: SHIPPED | OUTPATIENT
Start: 2025-08-20

## 2025-09-02 ENCOUNTER — APPOINTMENT (OUTPATIENT)
Dept: CARDIOLOGY | Facility: CLINIC | Age: 75
End: 2025-09-02
Payer: MEDICARE

## 2025-09-04 LAB — SCAN RESULT: NORMAL

## 2025-09-16 ENCOUNTER — APPOINTMENT (OUTPATIENT)
Dept: PRIMARY CARE | Facility: CLINIC | Age: 75
End: 2025-09-16
Payer: MEDICARE

## (undated) DEVICE — CATHETER, DIAGNOSTIC, 5FR,  PIG-145, 110CM, 6SH ANGLED

## (undated) DEVICE — TUBING, SUCTION, 1/4" X 10', STRAIGHT: Brand: MEDLINE

## (undated) DEVICE — SECTO® DISSECTOR, KITTNER, 5/16 IN DIAMETER, (5 EA/POUCH, 24 POUCHES/PK, 4 PK/BX): Brand: SYMMETRY SURGICAL

## (undated) DEVICE — MARKER SURG SKIN GENTIAN VLT REG TIP W/ 6IN RUL

## (undated) DEVICE — EMG TUBE 8229707 NIM TRIVANTAGE 7.0MM ID: Brand: NIM TRIVANTAGE™

## (undated) DEVICE — 3M™ STERI-STRIP™ REINFORCED ADHESIVE SKIN CLOSURES, R1547, 1/2 IN X 4 IN (12 MM X 100 MM), 6 STRIPS/ENVELOPE: Brand: 3M™ STERI-STRIP™

## (undated) DEVICE — 3M™ STERI-DRAPE™ INSTRUMENT POUCH 1018: Brand: STERI-DRAPE™

## (undated) DEVICE — COUNTER NDL 40 COUNT HLD 70 FOAM BLK ADH W/ MAG

## (undated) DEVICE — SUTURE ETHLN SZ 3-0 L18IN NONABSORBABLE BLK PS-2 L19MM 3/8 1669H

## (undated) DEVICE — DRAIN SURG 10FR 100% SIL RND END PERF W/ TRCR

## (undated) DEVICE — GAUZE,SPONGE,4"X4",16PLY,XRAY,STRL,LF: Brand: MEDLINE

## (undated) DEVICE — NEPTUNE E-SEP SMOKE EVACUATION PENCIL, COATED, 70MM BLADE, PUSH BUTTON SWITCH: Brand: NEPTUNE E-SEP

## (undated) DEVICE — DBD-PACK,EENT,SIRUS,PK II: Brand: MEDLINE

## (undated) DEVICE — MAGNETIC INSTR DRAPE 20X16: Brand: MEDLINE INDUSTRIES, INC.

## (undated) DEVICE — SINGLE PORT MANIFOLD: Brand: NEPTUNE 2

## (undated) DEVICE — TR BAND, RADIAL COMPRESSION, STANDARD, 24CM

## (undated) DEVICE — TOWEL,OR,DSP,ST,BLUE,STD,4/PK,20PK/CS: Brand: MEDLINE

## (undated) DEVICE — CATHETER, OPTITORQUE, 5FR, JACKY, 3.5/ 2H/110CM, CURVED

## (undated) DEVICE — ELECTRODE PT RET AD L9FT HI MOIST COND ADH HYDRGEL CORDED

## (undated) DEVICE — TUBING, MANIFOLD, LOW PRESSURE

## (undated) DEVICE — SUTURE PERMAHAND SZ 2-0 L12X18IN NONABSORBABLE BLK SILK A185H

## (undated) DEVICE — E-Z CLEAN, NON-STICK, PTFE COATED, ELECTROSURGICAL BLADE ELECTRODE, MODIFIED EXTENDED INSULATION, 2.5 INCH (6.35 CM): Brand: MEGADYNE

## (undated) DEVICE — SHEARS ENDOSCP L9CM CRV HARM FOCS +

## (undated) DEVICE — SUTURE PERMA-HAND SZ 2-0 L30IN NONABSORBABLE BLK L26MM SH K833H

## (undated) DEVICE — RESERVOIR,SUCTION,100CC,SILICONE: Brand: MEDLINE

## (undated) DEVICE — CATHETER, DIAGNOSTIC, AMPLATZ, 5 FR, AL 2

## (undated) DEVICE — CORD,CAUTERY,BIPOLAR,STERILE: Brand: MEDLINE

## (undated) DEVICE — NEEDLE HYPO 25GA L1.5IN BLU POLYPR HUB S STL REG BVL STR

## (undated) DEVICE — SUTURE VCRL SZ 3-0 L27IN ABSRB UD L26MM SH 1/2 CIR J416H

## (undated) DEVICE — GLOVE ORANGE PI 7 1/2   MSG9075

## (undated) DEVICE — SHEATH, GLIDESHEATH, SLENDER, 6FR 10CM

## (undated) DEVICE — APPLICATOR MEDICATED 10.5 CC SOLUTION HI LT ORNG CHLORAPREP

## (undated) DEVICE — GOWN,AURORA,NONREINFORCED,LARGE: Brand: MEDLINE

## (undated) DEVICE — INTENDED FOR TISSUE SEPARATION, AND OTHER PROCEDURES THAT REQUIRE A SHARP SURGICAL BLADE TO PUNCTURE OR CUT.: Brand: BARD-PARKER ® CARBON RIB-BACK BLADES

## (undated) DEVICE — LABEL MED MINI W/ MARKER

## (undated) DEVICE — SUTURE MCRYL SZ 4-0 L27IN ABSRB UD L19MM PS-2 1/2 CIR PRIM Y426H

## (undated) DEVICE — SYRINGE IRRIG 60ML SFT PLIABLE BLB EZ TO GRP 1 HND USE W/

## (undated) DEVICE — SYRINGE MED 10ML LUERLOCK TIP W/O SFTY DISP